# Patient Record
Sex: MALE | Race: WHITE | Employment: OTHER | URBAN - METROPOLITAN AREA
[De-identification: names, ages, dates, MRNs, and addresses within clinical notes are randomized per-mention and may not be internally consistent; named-entity substitution may affect disease eponyms.]

---

## 2017-01-23 ENCOUNTER — PRE VISIT (OUTPATIENT)
Dept: UROLOGY | Facility: CLINIC | Age: 72
End: 2017-01-23

## 2017-01-23 DIAGNOSIS — R97.20 ELEVATED PROSTATE SPECIFIC ANTIGEN (PSA): ICD-10-CM

## 2017-01-23 DIAGNOSIS — R97.20 ELEVATED PROSTATE SPECIFIC ANTIGEN (PSA): Primary | ICD-10-CM

## 2017-01-23 LAB — PSA SERPL-MCNC: 2.72 UG/L (ref 0–4)

## 2017-01-23 PROCEDURE — 84153 ASSAY OF PSA TOTAL: CPT | Performed by: FAMILY MEDICINE

## 2017-01-23 PROCEDURE — 36415 COLL VENOUS BLD VENIPUNCTURE: CPT | Performed by: FAMILY MEDICINE

## 2017-01-24 ENCOUNTER — OFFICE VISIT (OUTPATIENT)
Dept: UROLOGY | Facility: CLINIC | Age: 72
End: 2017-01-24

## 2017-01-24 ENCOUNTER — ALLIED HEALTH/NURSE VISIT (OUTPATIENT)
Dept: FAMILY MEDICINE | Facility: CLINIC | Age: 72
End: 2017-01-24

## 2017-01-24 VITALS — DIASTOLIC BLOOD PRESSURE: 107 MMHG | SYSTOLIC BLOOD PRESSURE: 179 MMHG | HEART RATE: 70 BPM

## 2017-01-24 VITALS
SYSTOLIC BLOOD PRESSURE: 201 MMHG | BODY MASS INDEX: 30.55 KG/M2 | WEIGHT: 230.5 LBS | HEART RATE: 67 BPM | DIASTOLIC BLOOD PRESSURE: 102 MMHG | HEIGHT: 73 IN

## 2017-01-24 DIAGNOSIS — R35.0 URINARY FREQUENCY: ICD-10-CM

## 2017-01-24 DIAGNOSIS — N40.0 ENLARGED PROSTATE: ICD-10-CM

## 2017-01-24 DIAGNOSIS — N40.0 BENIGN NON-NODULAR PROSTATIC HYPERPLASIA, PRESENCE OF LOWER URINARY TRACT SYMPTOMS UNSPECIFIED: Primary | ICD-10-CM

## 2017-01-24 DIAGNOSIS — I10 ESSENTIAL HYPERTENSION: Primary | ICD-10-CM

## 2017-01-24 DIAGNOSIS — R97.20 ELEVATED PROSTATE SPECIFIC ANTIGEN (PSA): ICD-10-CM

## 2017-01-24 LAB
ALBUMIN UR-MCNC: NEGATIVE MG/DL
APPEARANCE UR: CLEAR
BILIRUB UR QL STRIP: NEGATIVE
COLOR UR AUTO: YELLOW
GLUCOSE UR STRIP-MCNC: NEGATIVE MG/DL
HGB UR QL STRIP: NEGATIVE
KETONES UR STRIP-MCNC: NEGATIVE MG/DL
LEUKOCYTE ESTERASE UR QL STRIP: NEGATIVE
MUCOUS THREADS #/AREA URNS LPF: PRESENT /LPF
NITRATE UR QL: NEGATIVE
PH UR STRIP: 6 PH (ref 5–7)
RBC #/AREA URNS AUTO: 2 /HPF (ref 0–2)
SP GR UR STRIP: 1.01 (ref 1–1.03)
URN SPEC COLLECT METH UR: ABNORMAL
UROBILINOGEN UR STRIP-MCNC: 0 MG/DL (ref 0–2)
WBC #/AREA URNS AUTO: 1 /HPF (ref 0–2)

## 2017-01-24 RX ORDER — TAMSULOSIN HYDROCHLORIDE 0.4 MG/1
0.4 CAPSULE ORAL DAILY
Qty: 90 CAPSULE | Refills: 4 | Status: SHIPPED | OUTPATIENT
Start: 2017-01-24 | End: 2017-08-08

## 2017-01-24 RX ORDER — FINASTERIDE 5 MG/1
1 TABLET, FILM COATED ORAL DAILY
Qty: 30 TABLET | Refills: 11 | Status: SHIPPED | OUTPATIENT
Start: 2017-01-24 | End: 2017-01-24

## 2017-01-24 RX ORDER — TAMSULOSIN HYDROCHLORIDE 0.4 MG/1
0.4 CAPSULE ORAL DAILY
Qty: 60 CAPSULE | Refills: 6 | Status: SHIPPED | OUTPATIENT
Start: 2017-01-24 | End: 2017-01-24

## 2017-01-24 RX ORDER — FINASTERIDE 5 MG/1
1 TABLET, FILM COATED ORAL DAILY
Qty: 90 TABLET | Refills: 4 | Status: SHIPPED | OUTPATIENT
Start: 2017-01-24 | End: 2017-08-08

## 2017-01-24 ASSESSMENT — PAIN SCALES - GENERAL: PAINLEVEL: NO PAIN (0)

## 2017-01-24 NOTE — Clinical Note
"1/24/2017       RE: Jimi Moreno  [address] [facility] [address]     Dear Colleague,    Thank you for referring your patient, Jimi Moreno, to the [facility] PROST[facility] UROLOGIC CANCERS at [facility]. Please see a copy of my visit note below.    HPI: Mr. Jimi Moreno is a 72 year old year old male presenting today for evaluation of chief complaint(s): RECHECK  - recheck    Mr. Moreno is a 72M is PMH significant for HTN and BPH/luts.  He was last seen on 12/29/15 by Dr. March.  At that time he had history of BPH on finasteride/flomax as well as elevated PSA.  Has never had a biopsy. No FH prostate cancer.     Today states he ran out of tamsulosin 20 days ago, and now symptoms are back to their old ways - frequency, nocturia, slow stream, sensation of incomplete emptying.  IPSS today: 32 \"Unhappy\"  No hematuria, UTIs, prostate pain.     REVIEW OF DIAGNOSTICS:  01/23/17: 2.72ng/mL (x2 would be 5.44)   12/23/15: 1.81ng/mL  03/24/15 - started finasteride  01/13/15: 4.84ng/mL  01/13/15: UA negative  09/02/09: 3.39ng/mL    PMHx - Otherwise negative  PSxHx- No surgeries - \"4 stitches\" in his lifetime  SHx - worked as a , .  Never smoker.    FHx - no FH prostate cancer    Current Outpatient Prescriptions   Medication Sig Dispense Refill     finasteride (PROSCAR) 5 MG tablet TAKE ONE TABLET BY MOUTH ONCE DAILY 30 tablet 0     tamsulosin (FLOMAX) 0.4 MG 24 hr capsule Take 1 capsule (0.4 mg) by mouth daily 60 capsule 6     triamcinolone (KENALOG) 0.1 % ointment Apply topically 2 times daily To affected areas 120 g 2     fluocinonide (LIDEX) 0.05 % ointment Apply topically 2 times daily 60 g 5     T.E.D. KNEE LENGTH/L-LONG MISC wear on left leg 1 Each 1       ALLERGIES: Pollen extract/tree extract      REVIEW OF SYSTEMS:  As above in HPI, otherwise negaitve.     GENERAL PHYSICAL EXAM:   Vitals: /102 mmHg  Pulse 67  Ht " "1.854 m (6' 1\")  Wt 104.554 kg (230 lb 8 oz)  BMI 30.42 kg/m2  Body mass index is 30.42 kg/(m^2).    GENERAL: Well groomed, well developed, well nourished male in NAD.  NEURO: Alert and oriented x 3.  PSYCH: Normal mood and affect, pleasant and agreeable during interview and exam.     SUKHDEEP:      Normal rectal tone, small soft amount of stool in the rectal vault.      Large sized prostate, without tenderness, nodules or asymmetry (although couldn't reach top of base).    Uroflow:  Voids 97mL with AHH=881uC by BladderScan.  Qmax 8mL/second. Qave 3mL/second.  Pattern shows an early peak, then platteau then postvoid dribbling.     LABS: The last test results for Mr. Jimi Moreno were reviewed:  PSA - PSA     2.72   1/23/2017  PSA     1.81   12/23/2015  PSA     4.84   1/13/2015  PSA     3.39   9/2/2009  BMP -   Recent Labs   Lab Test  04/17/14   1450  09/02/09   0918   NA  139  139   POTASSIUM  4.2  4.5   CHLORIDE  105  104   CO2  25  27   BUN  15  17   CR  1.00  1.07   GLC  92  101*   MELIDA  8.9  8.8   PHOS   --   2.5       CBC -   Recent Labs   Lab Test  04/17/14   1450   WBC  6.2   HGB  16.7   PLT  180       ASSESSMENT:   1) BPH with LUTS  2) Elevated PSA - adjusted for finasteride-effect, PSA is relatively stable    PLAN:   - Continue finasteride - refilled x 1 year  - RX: Flomax (tamsulosin) 0,4mg daily refilled x 1 year  - UA micro reflex sent  - PVR checked  - Restart the Flomax.  After 3-4 weeks if still struggling with urinary symptoms, he should schedule an appointment with Dr. Wesley or Josh to explore surgical options. If happy on the Flomax, return in 6 months with PSA    Connie Nova PA-C  Department of Urologic Surgery      "

## 2017-01-24 NOTE — PATIENT INSTRUCTIONS
- Continue finasteride - refilled x 1 year  - RX: Flomax (tamsulosin) 0,4mg daily refilled x 1 year  - Urinalysis   - You aren't emptying all the way (leaving 6-7 ounces behind)  - Restart the Flomax.  After 3-4 weeks if still struggling with urinary symptoms, he should schedule an appointment with Dr. Wesley or Josh to explore surgical options. If happy on the Flomax, return in 6 months with PSA    Connie Nova PA-C

## 2017-01-24 NOTE — NURSING NOTE
"Chief Complaint   Patient presents with     RECHECK     Follow up- LUTS/elevated PSA       Initial Ht 1.854 m (6' 1\")  Wt 104.554 kg (230 lb 8 oz)  BMI 30.42 kg/m2 Estimated body mass index is 30.42 kg/(m^2) as calculated from the following:    Height as of this encounter: 1.854 m (6' 1\").    Weight as of this encounter: 104.554 kg (230 lb 8 oz).  BP completed using cuff size: PARKER Darby    "

## 2017-01-24 NOTE — MR AVS SNAPSHOT
After Visit Summary   1/24/2017    Jimi Moreno    MRN: [id number]           Patient Information     Date Of Birth          1945        Visit Information        Provider Department      1/24/2017 11:30 AM Brianda Nova PA [facility] and [facility] for Prostate and Urologic Cancers        Today's Diagnoses     Benign non-nodular prostatic hyperplasia, presence of lower urinary tract symptoms unspecified    -  1     Elevated prostate specific antigen (PSA)         Enlarged prostate         Urinary frequency           Care Instructions    - Continue finasteride - refilled x 1 year  - RX: Flomax (tamsulosin) 0,4mg daily refilled x 1 year  - Urinalysis   - You aren't emptying all the way (leaving 6-7 ounces behind)  - Restart the Flomax.  After 3-4 weeks if still struggling with urinary symptoms, he should schedule an appointment with Dr. Wesley or Josh to explore surgical options. If happy on the Flomax, return in 6 months with PSA    Connie Nova PA-C        Follow-ups after your visit        Future tests that were ordered for you today     Open Future Orders        Priority Expected Expires Ordered    PSA tumor marker Routine 5/24/2017 1/19/2018 1/24/2017            Who to contact     Please call your clinic at [phone] to:    Ask questions about your health    Make or cancel appointments    Discuss your medicines    Learn about your test results    Speak to your doctor   If you have compliments or concerns about an experience at your clinic, or if you wish to file a complaint, please contact [facility] Patient Relations at [phone] or email us at [email or web address]         Additional Information About Your Visit        [employer] Information     [employer] is an electronic gateway that provides easy, online access to your medical records. With [employer], you can request a clinic appointment, read your test results, renew a prescription or  "communicate with your care team.     To sign up for [employer] visit the website at [email or web address]   You will be asked to enter the access code listed below, as well as some personal information. Please follow the directions to create your username and password.     Your access code is: [email or web address]  Expires: 2017 12:12 PM     Your access code will  in 90 days. If you need help or a new code, please contact your [facility] or call [phone] for assistance.        Care EveryWhere ID     This is your Care EveryWhere ID. This could be used by other organizations to access your [facility] medical records  [id number]        Your Vitals Were     Pulse Height BMI (Body Mass Index)             67 1.854 m (6' 1\") 30.42 kg/m2          Blood Pressure from Last 3 Encounters:   17 201/102   12/29/15 172/102   03/24/15 162/79    Weight from Last 3 Encounters:   17 104.554 kg (230 lb 8 oz)   12/29/15 104.69 kg (230 lb 12.8 oz)   03/24/15 103.239 kg (227 lb 9.6 oz)              We Performed the Following     POST-VOID RESIDUAL BLADDER SCAN     Routine UA with micro reflex to culture          Today's Medication Changes          These changes are accurate as of: 17 12:12 PM.  If you have any questions, ask your nurse or doctor.               Start taking these medicines.        Dose/Directions    finasteride 5 MG tablet   Commonly known as:  PROSCAR   Used for:  Enlarged prostate   Started by:  Brianda Nova PA        Dose:  1 tablet   Take 1 tablet (5 mg) by mouth daily   Quantity:  90 tablet   Refills:  4         These medicines have changed or have updated prescriptions.        Dose/Directions    * tamsulosin 0.4 MG capsule   Commonly known as:  FLOMAX   This may have changed:  Another medication with the same name was added. Make sure you understand how and when to take each.   Used for:  Elevated prostate specific antigen (PSA), Urinary hesitancy "   Changed by:  Rob March MD        Dose:  0.4 mg   Take 1 capsule (0.4 mg) by mouth daily   Quantity:  60 capsule   Refills:  6       * tamsulosin 0.4 MG capsule   Commonly known as:  FLOMAX   This may have changed:  You were already taking a medication with the same name, and this prescription was added. Make sure you understand how and when to take each.   Used for:  Benign non-nodular prostatic hyperplasia, presence of lower urinary tract symptoms unspecified   Changed by:  Brianda Nova PA        Dose:  0.4 mg   Take 1 capsule (0.4 mg) by mouth daily   Quantity:  90 capsule   Refills:  4       * Notice:  This list has 2 medication(s) that are the same as other medications prescribed for you. Read the directions carefully, and ask your doctor or other care provider to review them with you.         Where to get your medicines      These medications were sent to [facility] [address] [place] [id number]     Phone:  [phone]    - finasteride 5 MG tablet  - tamsulosin 0.4 MG capsule             Primary Care Provider Office Phone # Fax #    Aj Becerra [phone]       [facility] [address] [address]        Thank you!     Thank you for choosing [facility] AND [facility] FOR PROSTATE AND UROLOGIC CANCERS  for your care. Our goal is always to provide you with excellent care. Hearing back from our patients is one way we can continue to improve our services. Please take a few minutes to complete the written survey that you may receive in the mail after your visit with us. Thank you!             Your Updated Medication List - Protect others around you: Learn how to safely use, store and throw away your medicines at www.disposemymeds.org.          This list is accurate as of: 1/24/17 12:12 PM.  Always use your most recent med list.                   Brand Name Dispense Instructions for  use    finasteride 5 MG tablet    PROSCAR    90 tablet    Take 1 tablet (5 mg) by mouth daily       fluocinonide 0.05 % ointment    LIDEX    60 g    Apply topically 2 times daily       T.E.D. KNEE LENGTH/L-LONG Misc     1 Each    wear on left leg       * tamsulosin 0.4 MG capsule    FLOMAX    60 capsule    Take 1 capsule (0.4 mg) by mouth daily       * tamsulosin 0.4 MG capsule    FLOMAX    90 capsule    Take 1 capsule (0.4 mg) by mouth daily       triamcinolone 0.1 % ointment    KENALOG    120 g    Apply topically 2 times daily To affected areas       * Notice:  This list has 2 medication(s) that are the same as other medications prescribed for you. Read the directions carefully, and ask your doctor or other care provider to review them with you.

## 2017-01-24 NOTE — Clinical Note
Jimi Kayecock   [address] [facility] [address]        Dear Mr. Moreno:     I am writing you concerning your recent test results:    Results for orders placed or performed in visit on 01/24/17   Routine UA with micro reflex to culture   Result Value Ref Range    Color Urine Yellow     Appearance Urine Clear     Glucose Urine Negative NEG mg/dL    Bilirubin Urine Negative NEG    Ketones Urine Negative NEG mg/dL    Specific Gravity Urine 1.010 1.003 - 1.035    Blood Urine Negative NEG    pH Urine 6.0 5.0 - 7.0 pH    Protein Albumin Urine Negative NEG mg/dL    Urobilinogen mg/dL 0.0 0.0 - 2.0 mg/dL    Nitrite Urine Negative NEG    Leukocyte Esterase Urine Negative NEG    Source Midstream Urine     WBC Urine 1 0 - 2 /HPF    RBC Urine 2 0 - 2 /HPF    Mucous Urine Present (A) NEG /LPF     Resulted Orders   Routine UA with micro reflex to culture   Result Value Ref Range    Color Urine Yellow     Appearance Urine Clear     Glucose Urine Negative NEG mg/dL    Bilirubin Urine Negative NEG    Ketones Urine Negative NEG mg/dL    Specific Gravity Urine 1.010 1.003 - 1.035    Blood Urine Negative NEG    pH Urine 6.0 5.0 - 7.0 pH    Protein Albumin Urine Negative NEG mg/dL    Urobilinogen mg/dL 0.0 0.0 - 2.0 mg/dL    Nitrite Urine Negative NEG    Leukocyte Esterase Urine Negative NEG    Source Midstream Urine     WBC Urine 1 0 - 2 /HPF    RBC Urine 2 0 - 2 /HPF    Mucous Urine Present (A) NEG /LPF       Your urinalysis is within normal limits. There is no significant blood or concern for infection.     Please let me know if you have any questions.      Sincerely,      Brianda Nova PA-C  Physician Assistant Urology        [facility] AND [facility] FOR PROSTATE AND UROLOGIC CANCERS  [address] [facility]  [address] [facility] [address]  Phone: [phone]  Fax: [phone]

## 2017-01-24 NOTE — PROGRESS NOTES
"HPI: Mr. Jimi Moreno is a 72 year old year old male presenting today for evaluation of chief complaint(s): RECHECK  - recheck    Mr. Moreno is a 72M is PMH significant for HTN and BPH/luts.  He was last seen on 12/29/15 by Dr. March.  At that time he had history of BPH on finasteride/flomax as well as elevated PSA.  Has never had a biopsy. No FH prostate cancer.     Today states he ran out of tamsulosin 20 days ago, and now symptoms are back to their old ways - frequency, nocturia, slow stream, sensation of incomplete emptying.  IPSS today: 32 \"Unhappy\"  No hematuria, UTIs, prostate pain.     REVIEW OF DIAGNOSTICS:  01/23/17: 2.72ng/mL (x2 would be 5.44)   12/23/15: 1.81ng/mL  03/24/15 - started finasteride  01/13/15: 4.84ng/mL  01/13/15: UA negative  09/02/09: 3.39ng/mL    PMHx - Otherwise negative  PSxHx- No surgeries - \"4 stitches\" in his lifetime  SHx - worked as a , .  Never smoker.    FHx - no FH prostate cancer    Current Outpatient Prescriptions   Medication Sig Dispense Refill     finasteride (PROSCAR) 5 MG tablet TAKE ONE TABLET BY MOUTH ONCE DAILY 30 tablet 0     tamsulosin (FLOMAX) 0.4 MG 24 hr capsule Take 1 capsule (0.4 mg) by mouth daily 60 capsule 6     triamcinolone (KENALOG) 0.1 % ointment Apply topically 2 times daily To affected areas 120 g 2     fluocinonide (LIDEX) 0.05 % ointment Apply topically 2 times daily 60 g 5     T.E.D. KNEE LENGTH/L-LONG MISC wear on left leg 1 Each 1       ALLERGIES: Pollen extract/tree extract      REVIEW OF SYSTEMS:  As above in HPI, otherwise negaitve.     GENERAL PHYSICAL EXAM:   Vitals: /102 mmHg  Pulse 67  Ht 1.854 m (6' 1\")  Wt 104.554 kg (230 lb 8 oz)  BMI 30.42 kg/m2  Body mass index is 30.42 kg/(m^2).    GENERAL: Well groomed, well developed, well nourished male in NAD.  NEURO: Alert and oriented x 3.  PSYCH: Normal mood and affect, pleasant and agreeable during interview and exam.     SUKHDEEP:      Normal rectal tone, small soft " amount of stool in the rectal vault.      Large sized prostate, without tenderness, nodules or asymmetry (although couldn't reach top of base).    Uroflow:  Voids 97mL with IDF=058cW by BladderScan.  Qmax 8mL/second. Qave 3mL/second.  Pattern shows an early peak, then platteau then postvoid dribbling.     LABS: The last test results for Mr. Jimi Moreno were reviewed:  PSA - PSA     2.72   1/23/2017  PSA     1.81   12/23/2015  PSA     4.84   1/13/2015  PSA     3.39   9/2/2009  BMP -   Recent Labs   Lab Test  04/17/14   1450  09/02/09   0918   NA  139  139   POTASSIUM  4.2  4.5   CHLORIDE  105  104   CO2  25  27   BUN  15  17   CR  1.00  1.07   GLC  92  101*   MELIDA  8.9  8.8   PHOS   --   2.5       CBC -   Recent Labs   Lab Test  04/17/14   1450   WBC  6.2   HGB  16.7   PLT  180       ASSESSMENT:   1) BPH with LUTS  2) Elevated PSA - adjusted for finasteride-effect, PSA is relatively stable    PLAN:   - Continue finasteride - refilled x 1 year  - RX: Flomax (tamsulosin) 0,4mg daily refilled x 1 year  - UA micro reflex sent  - PVR checked  - Restart the Flomax.  After 3-4 weeks if still struggling with urinary symptoms, he should schedule an appointment with Dr. Wesley or Josh to explore surgical options. If happy on the Flomax, return in 6 months with PSA    Connie Nova PA-C  Department of Urologic Surgery

## 2017-01-25 ENCOUNTER — TELEPHONE (OUTPATIENT)
Dept: UROLOGY | Facility: CLINIC | Age: 72
End: 2017-01-25

## 2017-01-25 NOTE — TELEPHONE ENCOUNTER
"Left message with Mr. Moreno yesterday to alert him to his high /102.  Asked him to recheck the BP and followup urgently with PCP OR go to the ED with symptoms.      Today I spoke with him.  He told me he had his BP recheck and \"it was a little better\" - \"199 over something.\"  He continues to deny headaches, dizziness, blurry vision or cardiac concerns.  He has an appt with his PCP first thing in the morning tomorrow.  He knows to go to the ED with any concerns.      Connie Nova PA-C  "

## 2017-01-26 ENCOUNTER — OFFICE VISIT (OUTPATIENT)
Dept: FAMILY MEDICINE | Facility: CLINIC | Age: 72
End: 2017-01-26
Payer: COMMERCIAL

## 2017-01-26 VITALS
HEART RATE: 79 BPM | DIASTOLIC BLOOD PRESSURE: 91 MMHG | TEMPERATURE: 97.9 F | WEIGHT: 236 LBS | BODY MASS INDEX: 31.14 KG/M2 | OXYGEN SATURATION: 95 % | SYSTOLIC BLOOD PRESSURE: 170 MMHG

## 2017-01-26 DIAGNOSIS — I10 ESSENTIAL HYPERTENSION WITH GOAL BLOOD PRESSURE LESS THAN 140/90: Primary | ICD-10-CM

## 2017-01-26 LAB
ANION GAP SERPL CALCULATED.3IONS-SCNC: 8 MMOL/L (ref 3–14)
BUN SERPL-MCNC: 17 MG/DL (ref 7–30)
CALCIUM SERPL-MCNC: 8.6 MG/DL (ref 8.5–10.1)
CHLORIDE SERPL-SCNC: 109 MMOL/L (ref 94–109)
CO2 SERPL-SCNC: 24 MMOL/L (ref 20–32)
CREAT SERPL-MCNC: 1.04 MG/DL (ref 0.66–1.25)
GFR SERPL CREATININE-BSD FRML MDRD: 70 ML/MIN/1.7M2
GLUCOSE SERPL-MCNC: 97 MG/DL (ref 70–99)
POTASSIUM SERPL-SCNC: 4.4 MMOL/L (ref 3.4–5.3)
SODIUM SERPL-SCNC: 141 MMOL/L (ref 133–144)

## 2017-01-26 PROCEDURE — 99213 OFFICE O/P EST LOW 20 MIN: CPT | Performed by: FAMILY MEDICINE

## 2017-01-26 PROCEDURE — 93000 ELECTROCARDIOGRAM COMPLETE: CPT | Performed by: FAMILY MEDICINE

## 2017-01-26 PROCEDURE — 36415 COLL VENOUS BLD VENIPUNCTURE: CPT | Performed by: FAMILY MEDICINE

## 2017-01-26 PROCEDURE — 80048 BASIC METABOLIC PNL TOTAL CA: CPT | Performed by: FAMILY MEDICINE

## 2017-01-26 RX ORDER — LISINOPRIL 10 MG/1
10 TABLET ORAL DAILY
Qty: 30 TABLET | Refills: 1 | Status: SHIPPED | OUTPATIENT
Start: 2017-01-26 | End: 2017-02-16

## 2017-01-26 ASSESSMENT — PAIN SCALES - GENERAL: PAINLEVEL: NO PAIN (0)

## 2017-01-26 NOTE — Clinical Note
[facility]   [address] [place]  [address]  [phone]                                   January 27, 2017    Jimi Moreno  [address] [facility] [address]        Dear Jimi,    Your basic metabolic panel which includes electrolytes,kidney function is normal and  -Glucose (diabetic screening test) is within normal limits    Follow up in 1 year(s)    Results for orders placed or performed in visit on 01/26/17   Basic metabolic panel   Result Value Ref Range    Sodium 141 133 - 144 mmol/L    Potassium 4.4 3.4 - 5.3 mmol/L    Chloride 109 94 - 109 mmol/L    Carbon Dioxide 24 20 - 32 mmol/L    Anion Gap 8 3 - 14 mmol/L    Glucose 97 70 - 99 mg/dL    Urea Nitrogen 17 7 - 30 mg/dL    Creatinine 1.04 0.66 - 1.25 mg/dL    GFR Estimate 70 >60 mL/min/1.7m2    GFR Estimate If Black 84 >60 mL/min/1.7m2    Calcium 8.6 8.5 - 10.1 mg/dL       If you have any questions please call the clinic at [phone]    Sincerely,    Taco Talbert MD  bmd

## 2017-01-26 NOTE — NURSING NOTE
"Chief Complaint   Patient presents with     Recheck Medication       Initial /91 mmHg  Pulse 79  Temp(Src) 97.9  F (36.6  C) (Oral)  Wt 236 lb (107.049 kg)  SpO2 95% Estimated body mass index is 31.14 kg/(m^2) as calculated from the following:    Height as of 1/24/17: 6' 1\" (1.854 m).    Weight as of this encounter: 236 lb (107.049 kg).  BP completed using cuff size: ping Frank MA    "

## 2017-01-26 NOTE — PROGRESS NOTES
SUBJECTIVE:                                                    Jimi Moreno is a 72 year old male who presents to clinic today for the following health issues:      Medication Followup of  All    Taking Medication as prescribed: yes    Side Effects:  None    Medication Helping Symptoms:  yes     Concerned about his blood pressure    Problem list and histories reviewed & adjusted, as indicated.    Never a smoker   No diabetes     Gm had diabetes   No heart problems     Patient does not take an asa     O: /91 mmHg  Pulse 79  Temp(Src) 97.9  F (36.6  C) (Oral)  Wt 236 lb (107.049 kg)  SpO2 95%    No bruits in the neck     Head: Normocephalic, atraumatic.  Eyes: Conjunctiva clear, non icteric. PERRLA.  Ears: External ears and TMs normal BL.  Nose: Septum midline, nasal mucosa pink and moist. No discharge.  Mouth / Throat: Normal dentition.  No oral lesions. Pharynx non erythematous, tonsils without hypertrophy.  Neck: Supple, no enlarged LN, trachea midline.    Chest wall normal to inspection and palpation. Good excursion bilaterally. Lungs clear to auscultation. Good air movement bilaterally without rales, wheezes, or rhonchi.   Regular rate and  rhythm. S1 and S2 normal, no murmurs, clicks, gallops or rubs. No edema or JVD.    The abdomen is soft without tenderness, guarding, mass, rebound or organomegaly. Bowel sounds are normal. No CVA tenderness or inguinal adenopathy noted.      ICD-10-CM    1. Essential hypertension with goal blood pressure less than 140/90 I10 lisinopril (PRINIVIL/ZESTRIL) 10 MG tablet     EKG 12-lead complete w/read - Clinics     Basic metabolic panel     Recheck in 1 month

## 2017-01-26 NOTE — MR AVS SNAPSHOT
"              After Visit Summary   2017    Jimi Moreno    MRN: [id number]           Patient Information     Date Of Birth          1945        Visit Information        Provider Department      2017 9:00 AM Taco Talbert MD [facility]        Today's Diagnoses     Essential hypertension with goal blood pressure less than 140/90    -  1        Follow-ups after your visit        Who to contact     If you have questions or need follow up information about today's clinic visit or your schedule please contact [facility] directly at [phone].  Normal or non-critical lab and imaging results will be communicated to you by [employer], letter or phone within 4 business days after the clinic has received the results. If you do not hear from us within 7 days, please contact the clinic through [employer] or phone. If you have a critical or abnormal lab result, we will notify you by phone as soon as possible.  Submit refill requests through [employer] or call your pharmacy and they will forward the refill request to us. Please allow 3 business days for your refill to be completed.          Additional Information About Your Visit        MyChart Information     [employer] lets you send messages to your doctor, view your test results, renew your prescriptions, schedule appointments and more. To sign up, go to [email or web address] . Click on \"Log in\" on the left side of the screen, which will take you to the Welcome page. Then click on \"Sign up Now\" on the right side of the page.     You will be asked to enter the access code listed below, as well as some personal information. Please follow the directions to create your username and password.     Your access code is: [email or web address]  Expires: 2017 12:12 PM     Your access code will  in 90 days. If you need help or a new code, please call your [facility] or [phone].        Care EveryWhere ID     This is " your Care EveryWhere ID. This could be used by other organizations to access your [place] medical records  [id number]        Your Vitals Were     Pulse Temperature Pulse Oximetry             79 97.9  F (36.6  C) (Oral) 95%          Blood Pressure from Last 3 Encounters:   01/26/17 170/91   01/24/17 179/107   01/24/17 201/102    Weight from Last 3 Encounters:   01/26/17 236 lb (107.049 kg)   01/24/17 230 lb 8 oz (104.554 kg)   12/29/15 230 lb 12.8 oz (104.69 kg)              We Performed the Following     Basic metabolic panel     EKG 12-lead complete w/read - Clinics          Today's Medication Changes          These changes are accurate as of: 1/26/17 10:09 AM.  If you have any questions, ask your nurse or doctor.               Start taking these medicines.        Dose/Directions    lisinopril 10 MG tablet   Commonly known as:  PRINIVIL/ZESTRIL   Used for:  Essential hypertension with goal blood pressure less than 140/90   Started by:  Taco Talbert MD        Dose:  10 mg   Take 1 tablet (10 mg) by mouth daily   Quantity:  30 tablet   Refills:  1            Where to get your medicines      These medications were sent to [facility] [address] [place] [address]     Phone:  [phone]    - lisinopril 10 MG tablet             Primary Care Provider Office Phone # Fax #    Aj Becerra [phone]       [facility] [address] [address]        Thank you!     Thank you for choosing [facility]  for your care. Our goal is always to provide you with excellent care. Hearing back from our patients is one way we can continue to improve our services. Please take a few minutes to complete the written survey that you may receive in the mail after your visit with us. Thank you!             Your Updated Medication List - Protect others around you: Learn how to safely use, store and  throw away your medicines at www.disposemymeds.org.          This list is accurate as of: 1/26/17 10:09 AM.  Always use your most recent med list.                   Brand Name Dispense Instructions for use    finasteride 5 MG tablet    PROSCAR    90 tablet    Take 1 tablet (5 mg) by mouth daily       lisinopril 10 MG tablet    PRINIVIL/ZESTRIL    30 tablet    Take 1 tablet (10 mg) by mouth daily       tamsulosin 0.4 MG capsule    FLOMAX    90 capsule    Take 1 capsule (0.4 mg) by mouth daily

## 2017-01-26 NOTE — Clinical Note
[facility]   [address] [place]  [address]  [phone]                                   January 27, 2017    Jimi Moreno  [address] [facility] [address]        Dear Jimi,    Your ekg was normal    Results for orders placed or performed in visit on 01/26/17   Basic metabolic panel   Result Value Ref Range    Sodium 141 133 - 144 mmol/L    Potassium 4.4 3.4 - 5.3 mmol/L    Chloride 109 94 - 109 mmol/L    Carbon Dioxide 24 20 - 32 mmol/L    Anion Gap 8 3 - 14 mmol/L    Glucose 97 70 - 99 mg/dL    Urea Nitrogen 17 7 - 30 mg/dL    Creatinine 1.04 0.66 - 1.25 mg/dL    GFR Estimate 70 >60 mL/min/1.7m2    GFR Estimate If Black 84 >60 mL/min/1.7m2    Calcium 8.6 8.5 - 10.1 mg/dL       If you have any questions please call the clinic at [phone]    Sincerely,    Taco Talbert MD  bmd

## 2017-01-27 NOTE — PROGRESS NOTES
Quick Note:    Your basic metabolic panel which includes electrolytes,kidney function is normal and -Glucose (diabetic screening test) is within normal limits    Follow up in 1 year(s)    ______

## 2017-02-07 PROBLEM — I10 ESSENTIAL HYPERTENSION WITH GOAL BLOOD PRESSURE LESS THAN 140/90: Status: ACTIVE | Noted: 2017-02-07

## 2017-02-16 ENCOUNTER — OFFICE VISIT (OUTPATIENT)
Dept: FAMILY MEDICINE | Facility: CLINIC | Age: 72
End: 2017-02-16
Payer: COMMERCIAL

## 2017-02-16 VITALS
SYSTOLIC BLOOD PRESSURE: 145 MMHG | HEART RATE: 71 BPM | BODY MASS INDEX: 31.27 KG/M2 | TEMPERATURE: 98.1 F | OXYGEN SATURATION: 95 % | DIASTOLIC BLOOD PRESSURE: 85 MMHG | WEIGHT: 237 LBS

## 2017-02-16 DIAGNOSIS — I10 ESSENTIAL HYPERTENSION WITH GOAL BLOOD PRESSURE LESS THAN 140/90: ICD-10-CM

## 2017-02-16 PROCEDURE — 99213 OFFICE O/P EST LOW 20 MIN: CPT | Performed by: FAMILY MEDICINE

## 2017-02-16 RX ORDER — LISINOPRIL 20 MG/1
20 TABLET ORAL DAILY
Qty: 30 TABLET | Refills: 1 | Status: SHIPPED | OUTPATIENT
Start: 2017-02-16 | End: 2017-03-30

## 2017-02-16 ASSESSMENT — PAIN SCALES - GENERAL: PAINLEVEL: NO PAIN (0)

## 2017-02-16 NOTE — NURSING NOTE
"Chief Complaint   Patient presents with     RECHECK     Blood pressure       Initial /85  Pulse 71  Temp 98.1  F (36.7  C) (Oral)  Wt 237 lb (107.5 kg)  SpO2 95%  BMI 31.27 kg/m2 Estimated body mass index is 31.27 kg/(m^2) as calculated from the following:    Height as of 1/24/17: 6' 1\" (1.854 m).    Weight as of this encounter: 237 lb (107.5 kg).  Medication Reconciliation: complete   Zac LENZ      "

## 2017-02-16 NOTE — MR AVS SNAPSHOT
After Visit Summary   2/16/2017    Jimi Moreno    MRN: [id number]           Patient Information     Date Of Birth          1945        Visit Information        Provider Department      2/16/2017 9:00 AM Taco Talbert MD [facility]        Today's Diagnoses     Essential hypertension with goal blood pressure less than 140/90          Care Instructions      Discharge Instructions for High Blood Pressure (Hypertension)  You have been diagnosed with high blood pressure (also called hypertension). This means the force of blood against your artery walls is too strong. It also means your heart is working hard to move blood. High blood pressure usually has no symptoms, but over time, it can damage your heart, blood vessels, eyes, kidneys, and other organs. With help from your doctor, you can manage your blood pressure and protect your health.  Taking medications    Learn to take your own blood pressure. Keep a record of your results. Ask your doctor which readings mean that you need medical attention.    Take your blood pressure medication exactly as directed. Don t skip doses. Missing doses can cause your blood pressure to get out of control.    Avoid medications that contain heart stimulants, including over-the-counter drugs. Check for warnings about high blood pressure on the label.    Check with your doctor before taking a decongestant. Some decongestants can worsen high blood pressure.  Lifestyle changes    Maintain a healthy weight. Get help to lose any extra pounds.    Cut back on salt.    Limit canned, dried, packaged, and fast foods.    Don t add salt to your food at the table.    Season foods with herbs instead of salt when you cook.    Follow the DASH (Dietary Approaches to Stop Hypertension) eating plan. This plan recommends vegetables, fruits, whole gains, and other heart healthy foods.    Begin an exercise program. Ask your doctor how to get started. The  American Heart Association recommends aerobic exercise 3 to 4 times a week for an average of 40 minutes at a time, with your doctor's approval. Simple activities like walking or gardening can help.    Break the smoking habit. Enroll in a stop-smoking program to improve your chances of success. Ask your health care provider about programs and medications to help you stop smoking.    Limit drinks that contain caffeine (coffee, black or green tea, cola) to 2 per day.    Never take stimulants such as amphetamines or cocaine; these drugs can be deadly for someone with high blood pressure.    Control your stress. Learn stress-management techniques.    Limit alcohol to no more than 1 drink a day for women and 2 drinks a day for men.  Follow-up care  Make a follow-up appointment as directed by our staff.     When to seek medical care  Call your doctor immediately if you have any of the following:    Chest pain or shortness of breath (call 911)    Moderate to severe headache    Weakness in the muscles of your face, arms, or legs    Trouble speaking    Extreme drowsiness    Confusion    Fainting or dizziness    Pulsating or rushing sound in your ears    Unexplained nosebleed    Weakness, tingling, or numbness of your face, arms, or legs    Change in vision    Blood pressure measured at home that is greater than 180/110     0626-2286 [employer]. [address] [place] [address], [place] [address]. All rights reserved. This information is not intended as a substitute for professional medical care. Always follow your healthcare professional's instructions.              Follow-ups after your visit        Follow-up notes from your care team     Return in about 4 weeks (around 3/16/2017).      Who to contact     If you have questions or need follow up information about today's clinic visit or your schedule please contact [facility] directly at [phone].  Normal or non-critical lab and imaging results  "will be communicated to you by MyChart, letter or phone within 4 business days after the clinic has received the results. If you do not hear from us within 7 days, please contact the clinic through WhatsApp or phone. If you have a critical or abnormal lab result, we will notify you by phone as soon as possible.  Submit refill requests through Whats[employer] or call your pharmacy and they will forward the refill request to us. Please allow 3 business days for your refill to be completed.          Additional Information About Your Visit        WhatsApp Information     [employer] lets you send messages to your doctor, view your test results, renew your prescriptions, schedule appointments and more. To sign up, go to [email or web address] . Click on \"Log in\" on the left side of the screen, which will take you to the Welcome page. Then click on \"Sign up Now\" on the right side of the page.     You will be asked to enter the access code listed below, as well as some personal information. Please follow the directions to create your username and password.     Your access code is: [email or web address]  Expires: 2017 12:12 PM     Your access code will  in 90 days. If you need help or a new code, please call your [place] clinic or [phone].        Care EveryWhere ID     This is your Care EveryWhere ID. This could be used by other organizations to access your [place] medical records  [id number]        Your Vitals Were     Pulse Temperature Pulse Oximetry BMI (Body Mass Index)          71 98.1  F (36.7  C) (Oral) 95% 31.27 kg/m2         Blood Pressure from Last 3 Encounters:   17 145/85   17 (!) 170/91   17 (!) 179/107    Weight from Last 3 Encounters:   17 237 lb (107.5 kg)   17 236 lb (107 kg)   17 230 lb 8 oz (104.6 kg)              Today, you had the following     No orders found for display         Today's Medication Changes          These changes are accurate as of: 17 10:06 AM.  If " you have any questions, ask your nurse or doctor.               These medicines have changed or have updated prescriptions.        Dose/Directions    lisinopril 20 MG tablet   Commonly known as:  PRINIVIL/ZESTRIL   This may have changed:    - medication strength  - how much to take   Used for:  Essential hypertension with goal blood pressure less than 140/90   Changed by:  Taco Talbert MD        Dose:  20 mg   Take 1 tablet (20 mg) by mouth daily   Quantity:  30 tablet   Refills:  1            Where to get your medicines      These medications were sent to [facility] [address] [place] [address] [address] [address]     Phone:  [phone]     lisinopril 20 MG tablet                Primary Care Provider Office Phone # Fax #    Aj Becerra [phone]       [facility] [address]    [facility] [address]        Thank you!     Thank you for choosing [facility]  for your care. Our goal is always to provide you with excellent care. Hearing back from our patients is one way we can continue to improve our services. Please take a few minutes to complete the written survey that you may receive in the mail after your visit with us. Thank you!             Your Updated Medication List - Protect others around you: Learn how to safely use, store and throw away your medicines at www.disposemymeds.org.          This list is accurate as of: 2/16/17 10:06 AM.  Always use your most recent med list.                   Brand Name Dispense Instructions for use    finasteride 5 MG tablet    PROSCAR    90 tablet    Take 1 tablet (5 mg) by mouth daily       lisinopril 20 MG tablet    PRINIVIL/ZESTRIL    30 tablet    Take 1 tablet (20 mg) by mouth daily       tamsulosin 0.4 MG capsule    FLOMAX    90 capsule    Take 1 capsule (0.4 mg) by mouth daily

## 2017-02-16 NOTE — PATIENT INSTRUCTIONS
Discharge Instructions for High Blood Pressure (Hypertension)  You have been diagnosed with high blood pressure (also called hypertension). This means the force of blood against your artery walls is too strong. It also means your heart is working hard to move blood. High blood pressure usually has no symptoms, but over time, it can damage your heart, blood vessels, eyes, kidneys, and other organs. With help from your doctor, you can manage your blood pressure and protect your health.  Taking medications    Learn to take your own blood pressure. Keep a record of your results. Ask your doctor which readings mean that you need medical attention.    Take your blood pressure medication exactly as directed. Don t skip doses. Missing doses can cause your blood pressure to get out of control.    Avoid medications that contain heart stimulants, including over-the-counter drugs. Check for warnings about high blood pressure on the label.    Check with your doctor before taking a decongestant. Some decongestants can worsen high blood pressure.  Lifestyle changes    Maintain a healthy weight. Get help to lose any extra pounds.    Cut back on salt.    Limit canned, dried, packaged, and fast foods.    Don t add salt to your food at the table.    Season foods with herbs instead of salt when you cook.    Follow the DASH (Dietary Approaches to Stop Hypertension) eating plan. This plan recommends vegetables, fruits, whole gains, and other heart healthy foods.    Begin an exercise program. Ask your doctor how to get started. The American Heart Association recommends aerobic exercise 3 to 4 times a week for an average of 40 minutes at a time, with your doctor's approval. Simple activities like walking or gardening can help.    Break the smoking habit. Enroll in a stop-smoking program to improve your chances of success. Ask your health care provider about programs and medications to help you stop smoking.    Limit drinks that contain  caffeine (coffee, black or green tea, cola) to 2 per day.    Never take stimulants such as amphetamines or cocaine; these drugs can be deadly for someone with high blood pressure.    Control your stress. Learn stress-management techniques.    Limit alcohol to no more than 1 drink a day for women and 2 drinks a day for men.  Follow-up care  Make a follow-up appointment as directed by our staff.     When to seek medical care  Call your doctor immediately if you have any of the following:    Chest pain or shortness of breath (call 911)    Moderate to severe headache    Weakness in the muscles of your face, arms, or legs    Trouble speaking    Extreme drowsiness    Confusion    Fainting or dizziness    Pulsating or rushing sound in your ears    Unexplained nosebleed    Weakness, tingling, or numbness of your face, arms, or legs    Change in vision    Blood pressure measured at home that is greater than 180/110     3068-5250 [employer]. [address] [place] [address], [place] [address]. All rights reserved. This information is not intended as a substitute for professional medical care. Always follow your healthcare professional's instructions.

## 2017-02-16 NOTE — PROGRESS NOTES
SUBJECTIVE:                                                    Jimi Moreno is a 72 year old male who presents to clinic today for the following health issues:    Blood pressure follow up    Ros: no chest pain, chest tightness   No sob   No leg edema   No abdominal pain     Denies fever or chills   Weight stable     Patient is having phlegm   He is coughing for this reason     Patient has a bp machine   He missed the pill one day and the next day and the bp was high         Problem list and histories reviewed & adjusted, as indicated.    O: /85  Pulse 71  Temp 98.1  F (36.7  C) (Oral)  Wt 237 lb (107.5 kg)  SpO2 95%  BMI 31.27 kg/m2    bp was 150/80 for repeat     Chest wall normal to inspection and palpation. Good excursion bilaterally. Lungs clear to auscultation. Good air movement bilaterally without rales, wheezes, or rhonchi.   Regular rate and  rhythm. S1 and S2 normal, no murmurs, clicks, gallops or rubs. No edema or JVD.      ICD-10-CM    1. Essential hypertension with goal blood pressure less than 140/90 I10      Recheck in 1 month   Will do a bmp next month

## 2017-03-21 ENCOUNTER — TELEPHONE (OUTPATIENT)
Dept: FAMILY MEDICINE | Facility: CLINIC | Age: 72
End: 2017-03-21

## 2017-03-21 NOTE — TELEPHONE ENCOUNTER
Panel Management Review      Patient has the following on his problem list:     Hypertension   Last three blood pressure readings:  BP Readings from Last 3 Encounters:   02/16/17 145/85   01/26/17 (!) 170/91   01/24/17 (!) 179/107     Blood pressure: FAILED    HTN Guidelines:  Age 18-59 BP range:  Less than 140/90  Age 60-85 with Diabetes:  Less than 140/90  Age 60-85 without Diabetes:  less than 150/90      Composite cancer screening  Chart review shows that this patient is due/due soon for the following Colonoscopy  Summary:    Patient is due/failing the following:   COLONOSCOPY    Action needed:   Patient needs referral/order: Colonoscopy    Type of outreach:    Sent letter.    Questions for provider review:    None                                                                                                                                    Mana Francis MA       Chart routed to Care Team .

## 2017-03-21 NOTE — LETTER
March 21, 2017    Jimi Moreno  [address] [facility] [address]    Dear Jimi    We care about your health and have reviewed your health plan. We have reviewed your medical conditions, medication list, and lab results and are making recommendations based on this review, to better manage your health.    You are in particular need of attention regarding:  - Scheduling a Colon Cancer Screening (Colonoscopy only) [phone]      Here is a list of Health Maintenance topics that are due now or due soon:  Health Maintenance Due   Topic Date Due     TETANUS IMMUNIZATION (SYSTEM ASSIGNED)  01/05/1963     ADVANCE DIRECTIVE PLANNING Q5 YRS (NO INBASKET)  01/05/1961     PNEUMOCOCCAL (1 of 2 - PCV13) 01/05/2008     AORTIC ANEURYSM SCREENING (SYSTEM ASSIGNED)  01/05/2008     LIPID SCREEN Q5 YR MALE (SYSTEM ASSIGNED)  09/02/2014     COLON CANCER SCREEN (SYSTEM ASSIGNED)  01/19/2015     We will be calling you in the next couple of weeks to help you schedule any appointments that are needed.  Please call us at [phone] (or use [employer]) to address the above recommendations.     Thank you for trusting [facility] and we appreciate the opportunity to serve you.  We look forward to supporting your healthcare needs in the future.    Healthy Regards,    Your Care Team

## 2017-03-30 ENCOUNTER — OFFICE VISIT (OUTPATIENT)
Dept: FAMILY MEDICINE | Facility: CLINIC | Age: 72
End: 2017-03-30
Payer: COMMERCIAL

## 2017-03-30 VITALS
DIASTOLIC BLOOD PRESSURE: 70 MMHG | HEART RATE: 65 BPM | OXYGEN SATURATION: 95 % | BODY MASS INDEX: 31.14 KG/M2 | TEMPERATURE: 97.4 F | WEIGHT: 236 LBS | SYSTOLIC BLOOD PRESSURE: 130 MMHG

## 2017-03-30 DIAGNOSIS — I10 ESSENTIAL HYPERTENSION WITH GOAL BLOOD PRESSURE LESS THAN 140/90: ICD-10-CM

## 2017-03-30 LAB
ANION GAP SERPL CALCULATED.3IONS-SCNC: 9 MMOL/L (ref 3–14)
BUN SERPL-MCNC: 20 MG/DL (ref 7–30)
CALCIUM SERPL-MCNC: 9 MG/DL (ref 8.5–10.1)
CHLORIDE SERPL-SCNC: 109 MMOL/L (ref 94–109)
CO2 SERPL-SCNC: 24 MMOL/L (ref 20–32)
CREAT SERPL-MCNC: 1.09 MG/DL (ref 0.66–1.25)
GFR SERPL CREATININE-BSD FRML MDRD: 66 ML/MIN/1.7M2
GLUCOSE SERPL-MCNC: 108 MG/DL (ref 70–99)
POTASSIUM SERPL-SCNC: 4.4 MMOL/L (ref 3.4–5.3)
SODIUM SERPL-SCNC: 142 MMOL/L (ref 133–144)

## 2017-03-30 PROCEDURE — 80048 BASIC METABOLIC PNL TOTAL CA: CPT | Performed by: FAMILY MEDICINE

## 2017-03-30 PROCEDURE — 36415 COLL VENOUS BLD VENIPUNCTURE: CPT | Performed by: FAMILY MEDICINE

## 2017-03-30 PROCEDURE — 99214 OFFICE O/P EST MOD 30 MIN: CPT | Performed by: FAMILY MEDICINE

## 2017-03-30 RX ORDER — LISINOPRIL 20 MG/1
20 TABLET ORAL DAILY
Qty: 90 TABLET | Refills: 3 | Status: SHIPPED | OUTPATIENT
Start: 2017-03-30 | End: 2018-04-14

## 2017-03-30 ASSESSMENT — PAIN SCALES - GENERAL: PAINLEVEL: NO PAIN (0)

## 2017-03-30 NOTE — PROGRESS NOTES
SUBJECTIVE:                                                    Jimi Moreno is a 72 year old male who presents to clinic today for the following health issues:      Blood pressure follow       Problem list and histories reviewed & adjusted, as indicated.      Current Outpatient Prescriptions   Medication Sig Dispense Refill     lisinopril (PRINIVIL/ZESTRIL) 20 MG tablet Take 1 tablet (20 mg) by mouth daily 30 tablet 1     tamsulosin (FLOMAX) 0.4 MG capsule Take 1 capsule (0.4 mg) by mouth daily 90 capsule 4     finasteride (PROSCAR) 5 MG tablet Take 1 tablet (5 mg) by mouth daily 90 tablet 4       Ros: no chest pain, chest tightness   No sob   No leg edema   No abdominal pain     Denies fever or chills   Weight stable      NO  NEURO SYMPTOMS       O: /70  Pulse 65  Temp 97.4  F (36.3  C) (Oral)  Wt 236 lb (107 kg)  SpO2 95%  BMI 31.14 kg/m2    Head: Normocephalic, atraumatic.  Eyes: Conjunctiva clear, non icteric. PERRLA.  Ears: External ears and TMs normal BL.  Nose: Septum midline, nasal mucosa pink and moist. No discharge.  Mouth / Throat: Normal dentition.  No oral lesions. Pharynx non erythematous, tonsils without hypertrophy.  Neck: Supple, no enlarged LN, trachea midline.]    Vital Signs 1/26/2017 2/16/2017 2/16/2017 3/30/2017 3/30/2017   Systolic 170  145 142 130   Diastolic 91  85 82 70   Pulse 79  71 65    Temperature 97.9  98.1 97.4    Respirations        Weight (LB) 236 lb  237 lb 236 lb    Height        BMI        Pain  0  0    O2 95  95 95      Chest wall normal to inspection and palpation. Good excursion bilaterally. Lungs clear to auscultation. Good air movement bilaterally without rales, wheezes, or rhonchi.   Regular rate and  rhythm. S1 and S2 normal, no murmurs, clicks, gallops or rubs. No edema or JVD.        ICD-10-CM    1. Essential hypertension with goal blood pressure less than 140/90 I10 lisinopril (PRINIVIL/ZESTRIL) 20 MG tablet     Basic metabolic panel     Basic metabolic  panel

## 2017-03-30 NOTE — LETTER
[facility]   [address] [place]  [address]  [phone]                                   March 31, 2017    Jimi Moreno  [address] [facility] [address]        Dear Jimi,    Your basic metabolic panel which includes electrolytes,kidney function is normal and  -Glucose (diabetic screening test) is mildly  Elevated.  This is in the pre-diabetic range.  Watch you sugars.  Repeat in 1 year.     Results for orders placed or performed in visit on 03/30/17   Basic metabolic panel   Result Value Ref Range    Sodium 142 133 - 144 mmol/L    Potassium 4.4 3.4 - 5.3 mmol/L    Chloride 109 94 - 109 mmol/L    Carbon Dioxide 24 20 - 32 mmol/L    Anion Gap 9 3 - 14 mmol/L    Glucose 108 (H) 70 - 99 mg/dL    Urea Nitrogen 20 7 - 30 mg/dL    Creatinine 1.09 0.66 - 1.25 mg/dL    GFR Estimate 66 >60 mL/min/1.7m2    GFR Estimate If Black 80 >60 mL/min/1.7m2    Calcium 9.0 8.5 - 10.1 mg/dL       If you have any questions please call the clinic at [phone]    Sincerely,    Taco Talbert MD  bmd

## 2017-03-30 NOTE — NURSING NOTE
"Chief Complaint   Patient presents with     Hypertension     Follow up       Initial /82  Pulse 65  Temp 97.4  F (36.3  C) (Oral)  Wt 236 lb (107 kg)  SpO2 95%  BMI 31.14 kg/m2 Estimated body mass index is 31.14 kg/(m^2) as calculated from the following:    Height as of 1/24/17: 6' 1\" (1.854 m).    Weight as of this encounter: 236 lb (107 kg).  Medication Reconciliation: complete    "

## 2017-03-30 NOTE — MR AVS SNAPSHOT
"              After Visit Summary   3/30/2017    Jimi Moreno    MRN: [id number]           Patient Information     Date Of Birth          1945        Visit Information        Provider Department      3/30/2017 8:40 AM Taco Talbert MD [facility]        Today's Diagnoses     Essential hypertension with goal blood pressure less than 140/90           Follow-ups after your visit        Follow-up notes from your care team     Return in about 6 months (around 9/30/2017) for BP Recheck.      Future tests that were ordered for you today     Open Future Orders        Priority Expected Expires Ordered    Basic metabolic panel Routine 9/30/2017 3/30/2018 3/30/2017            Who to contact     If you have questions or need follow up information about today's clinic visit or your schedule please contact [facility] directly at [phone].  Normal or non-critical lab and imaging results will be communicated to you by MyChart, letter or phone within 4 business days after the clinic has received the results. If you do not hear from us within 7 days, please contact the clinic through MyChart or phone. If you have a critical or abnormal lab result, we will notify you by phone as soon as possible.  Submit refill requests through [employer] or call your pharmacy and they will forward the refill request to us. Please allow 3 business days for your refill to be completed.          Additional Information About Your Visit        MyChart Information     [employer] lets you send messages to your doctor, view your test results, renew your prescriptions, schedule appointments and more. To sign up, go to [email or web address] . Click on \"Log in\" on the left side of the screen, which will take you to the Welcome page. Then click on \"Sign up Now\" on the right side of the page.     You will be asked to enter the access code listed below, as well as some personal information. Please follow " the directions to create your username and password.     Your access code is: [email or web address]  Expires: 2017  1:12 PM     Your access code will  in 90 days. If you need help or a new code, please call your [facility] clinic or [phone].        Care EveryWhere ID     This is your Care EveryWhere ID. This could be used by other organizations to access your [facility] medical records  [id number]        Your Vitals Were     Pulse Temperature Pulse Oximetry BMI (Body Mass Index)          65 97.4  F (36.3  C) (Oral) 95% 31.14 kg/m2         Blood Pressure from Last 3 Encounters:   17 130/70   17 145/85   17 (!) 170/91    Weight from Last 3 Encounters:   17 236 lb (107 kg)   17 237 lb (107.5 kg)   17 236 lb (107 kg)              We Performed the Following     Basic metabolic panel          Where to get your medicines      These medications were sent to [facility] [address] [place] [address] [address]kw[address]  [address] [place] [address] [facility] [address]     Phone:  [phone]     lisinopril 20 MG tablet          Primary Care Provider Office Phone # Fax #    Taco Talbert [phone]       [facility] [address] [facility] [place] [address]        Thank you!     Thank you for choosing [facility]  for your care. Our goal is always to provide you with excellent care. Hearing back from our patients is one way we can continue to improve our services. Please take a few minutes to complete the written survey that you may receive in the mail after your visit with us. Thank you!             Your Updated Medication List - Protect others around you: Learn how to safely use, store and throw away your medicines at www.disposemymeds.org.          This list is accurate as of: 3/30/17  9:25 AM.  Always use your most recent med list.                   Brand Name Dispense Instructions for use    finasteride 5 MG tablet     PROSCAR    90 tablet    Take 1 tablet (5 mg) by mouth daily       lisinopril 20 MG tablet    PRINIVIL/ZESTRIL    90 tablet    Take 1 tablet (20 mg) by mouth daily       tamsulosin 0.4 MG capsule    FLOMAX    90 capsule    Take 1 capsule (0.4 mg) by mouth daily

## 2017-03-31 NOTE — PROGRESS NOTES
Your basic metabolic panel which includes electrolytes,kidney function is normal and  -Glucose (diabetic screening test) is mildly  Elevated.  This is in the pre-diabetic range.  Watch you sugars.  Repeat in 1 year.

## 2017-04-05 ENCOUNTER — TELEPHONE (OUTPATIENT)
Dept: FAMILY MEDICINE | Facility: CLINIC | Age: 72
End: 2017-04-05
Payer: COMMERCIAL

## 2017-04-05 NOTE — TELEPHONE ENCOUNTER
Reason for Call:  Other returning call    Detailed comments: Patient is returning a call back from the clinic.  He states he received a voicemail about 1 Hour ago from the clinic.    Phone Number Patient can be reached at: Home number on file [phone] (home)    Best Time: Any     Can we leave a detailed message on this number? YES    Call taken on 4/5/2017 at 2:22 PM by Brenna [place]

## 2017-04-19 NOTE — TELEPHONE ENCOUNTER
4/19/17-2nd call attempt, spoke with patient. He was out and about and will call us back to get the scheduling number for colonoscopy.  Mana Francis MA

## 2017-07-24 DIAGNOSIS — I10 ESSENTIAL HYPERTENSION WITH GOAL BLOOD PRESSURE LESS THAN 140/90: ICD-10-CM

## 2017-07-24 DIAGNOSIS — R97.20 ELEVATED PROSTATE SPECIFIC ANTIGEN (PSA): ICD-10-CM

## 2017-07-24 LAB
ANION GAP SERPL CALCULATED.3IONS-SCNC: 5 MMOL/L (ref 3–14)
BUN SERPL-MCNC: 18 MG/DL (ref 7–30)
CALCIUM SERPL-MCNC: 9.1 MG/DL (ref 8.5–10.1)
CHLORIDE SERPL-SCNC: 108 MMOL/L (ref 94–109)
CO2 SERPL-SCNC: 27 MMOL/L (ref 20–32)
CREAT SERPL-MCNC: 1.11 MG/DL (ref 0.66–1.25)
GFR SERPL CREATININE-BSD FRML MDRD: 65 ML/MIN/1.7M2
GLUCOSE SERPL-MCNC: 92 MG/DL (ref 70–99)
POTASSIUM SERPL-SCNC: 5 MMOL/L (ref 3.4–5.3)
PSA SERPL-MCNC: 2.37 UG/L (ref 0–4)
SODIUM SERPL-SCNC: 140 MMOL/L (ref 133–144)

## 2017-07-24 PROCEDURE — 36415 COLL VENOUS BLD VENIPUNCTURE: CPT | Performed by: INTERNAL MEDICINE

## 2017-07-24 PROCEDURE — 84153 ASSAY OF PSA TOTAL: CPT | Performed by: INTERNAL MEDICINE

## 2017-07-24 PROCEDURE — 80048 BASIC METABOLIC PNL TOTAL CA: CPT | Performed by: INTERNAL MEDICINE

## 2017-07-24 NOTE — LETTER
[facility]   [address] [place]  [address]  [phone]                                   July 25, 2017    Jimi Moreno  [address] [facility] [address]        Dear Jimi,    Your basic metabolic panel which includes electrolytes,kidney function is normal and  -Glucose (diabetic screening test) is within normal limits    Follow up in 1 year(s)    Results for orders placed or performed in visit on 07/24/17   PSA tumor marker   Result Value Ref Range    PSA 2.37 0 - 4 ug/L   Basic metabolic panel   Result Value Ref Range    Sodium 140 133 - 144 mmol/L    Potassium 5.0 3.4 - 5.3 mmol/L    Chloride 108 94 - 109 mmol/L    Carbon Dioxide 27 20 - 32 mmol/L    Anion Gap 5 3 - 14 mmol/L    Glucose 92 70 - 99 mg/dL    Urea Nitrogen 18 7 - 30 mg/dL    Creatinine 1.11 0.66 - 1.25 mg/dL    GFR Estimate 65 >60 mL/min/1.7m2    GFR Estimate If Black 78 >60 mL/min/1.7m2    Calcium 9.1 8.5 - 10.1 mg/dL       If you have any questions please call the clinic at [phone]    Sincerely,    Taco Talbert MD  bmd

## 2017-07-24 NOTE — LETTER
Jimi Moreno   [address] [facility] [address]        Dear Mr. Moreno:    I am writing you concerning your recent test results:    Results for orders placed or performed in visit on 07/24/17   PSA tumor marker   Result Value Ref Range    PSA 2.37 0 - 4 ug/L   Basic metabolic panel   Result Value Ref Range    Sodium 140 133 - 144 mmol/L    Potassium 5.0 3.4 - 5.3 mmol/L    Chloride 108 94 - 109 mmol/L    Carbon Dioxide 27 20 - 32 mmol/L    Anion Gap 5 3 - 14 mmol/L    Glucose 92 70 - 99 mg/dL    Urea Nitrogen 18 7 - 30 mg/dL    Creatinine 1.11 0.66 - 1.25 mg/dL    GFR Estimate 65 >60 mL/min/1.7m2    GFR Estimate If Black 78 >60 mL/min/1.7m2    Calcium 9.1 8.5 - 10.1 mg/dL         Your PSA is completely stable.  Your other labs are in the normal range as well.     Please let me know if you have any questions.      Sincerely,      Brianda Nova PA-C  Physician Assistant Urology        [address] [address]  Phone: [phone]

## 2017-07-25 NOTE — PROGRESS NOTES
Your basic metabolic panel which includes electrolytes,kidney function is normal and  -Glucose (diabetic screening test) is within normal limits    Follow up in 1 year(s)

## 2017-07-31 ENCOUNTER — PRE VISIT (OUTPATIENT)
Dept: UROLOGY | Facility: CLINIC | Age: 72
End: 2017-07-31

## 2017-08-08 ENCOUNTER — OFFICE VISIT (OUTPATIENT)
Dept: UROLOGY | Facility: CLINIC | Age: 72
End: 2017-08-08

## 2017-08-08 VITALS
HEART RATE: 72 BPM | BODY MASS INDEX: 31.78 KG/M2 | HEIGHT: 73 IN | DIASTOLIC BLOOD PRESSURE: 86 MMHG | WEIGHT: 239.8 LBS | SYSTOLIC BLOOD PRESSURE: 147 MMHG

## 2017-08-08 DIAGNOSIS — N40.0 ENLARGED PROSTATE: ICD-10-CM

## 2017-08-08 DIAGNOSIS — N40.1 BENIGN PROSTATIC HYPERPLASIA WITH LOWER URINARY TRACT SYMPTOMS, SYMPTOM DETAILS UNSPECIFIED: Primary | ICD-10-CM

## 2017-08-08 DIAGNOSIS — I10 ESSENTIAL HYPERTENSION WITH GOAL BLOOD PRESSURE LESS THAN 140/90: ICD-10-CM

## 2017-08-08 RX ORDER — FINASTERIDE 5 MG/1
1 TABLET, FILM COATED ORAL DAILY
Qty: 90 TABLET | Refills: 4 | Status: SHIPPED | OUTPATIENT
Start: 2017-08-08 | End: 2018-10-20

## 2017-08-08 RX ORDER — TAMSULOSIN HYDROCHLORIDE 0.4 MG/1
0.4 CAPSULE ORAL DAILY
Qty: 90 CAPSULE | Refills: 4 | Status: SHIPPED | OUTPATIENT
Start: 2017-08-08 | End: 2018-10-20

## 2017-08-08 ASSESSMENT — PAIN SCALES - GENERAL: PAINLEVEL: NO PAIN (0)

## 2017-08-08 NOTE — PATIENT INSTRUCTIONS
- Avoid caffeine prior to leaving the house (or if you know you will not have a toilet near you)  - Continue finasteride and flomax  - For blood pressure.  Continue taking it at home.  Followup with your primary care doctor if still high.  Exercise a little each day.  If you could lose 5-10 lbs that would make a big difference.  Reduce salt - this will make a huge difference.  The number one way to protect your kidneys is by keeping blood pressure under control.    - Return in 1 year for a PSA / exam    Connie Nova. DAVID

## 2017-08-08 NOTE — MR AVS SNAPSHOT
After Visit Summary   8/8/2017    Jimi Moreno    MRN: [id number]           Patient Information     Date Of Birth          1945        Visit Information        Provider Department      8/8/2017 1:00 PM Brianda Nova PA M [facility]rology and [facility]        Today's Diagnoses     Benign prostatic hyperplasia with lower urinary tract symptoms, symptom details unspecified    -  1    Essential hypertension with goal blood pressure less than 140/90        Enlarged prostate          Care Instructions    - Avoid caffeine prior to leaving the house (or if you know you will not have a toilet near you)  - Continue finasteride and flomax  - For blood pressure.  Continue taking it at home.  Followup with your primary care doctor if still high.  Exercise a little each day.  If you could lose 5-10 lbs that would make a big difference.  Reduce salt - this will make a huge difference.  The number one way to protect your kidneys is by keeping blood pressure under control.    - Return in 1 year for a PSA / exam    Connie Yang PA-C          Follow-ups after your visit        Who to contact     Please call your clinic at [phone] to:    Ask questions about your health    Make or cancel appointments    Discuss your medicines    Learn about your test results    Speak to your doctor   If you have compliments or concerns about an experience at your clinic, or if you wish to file a complaint, please contact [facility] Patient Relations at [phone] or email us at [email or web address]         Additional Information About Your Visit        [employer] Information     [employer] is an electronic gateway that provides easy, online access to your medical records. With [employer], you can request a clinic appointment, read your test results, renew a prescription or communicate with your care team.     To sign up for [employer] visit the website at  "[email or web address]   You will be asked to enter the access code listed below, as well as some personal information. Please follow the directions to create your username and password.     Your access code is: [email or web address]  Expires: 10/23/2017  6:30 AM     Your access code will  in 90 days. If you need help or a new code, please contact your [facility] or call [phone] for assistance.        Care EveryWhere ID     This is your Care EveryWhere ID. This could be used by other organizations to access your [facility] medical records  [id number]        Your Vitals Were     Pulse Height BMI (Body Mass Index)             76 1.854 m (6' 1\") 31.64 kg/m2          Blood Pressure from Last 3 Encounters:   17 174/87   17 130/70   17 145/85    Weight from Last 3 Encounters:   17 108.8 kg (239 lb 12.8 oz)   17 107 kg (236 lb)   17 107.5 kg (237 lb)              Today, you had the following     No orders found for display         Where to get your medicines      These medications were sent to [facility] [address] [place] [address]     Phone:  [phone]     finasteride 5 MG tablet    tamsulosin 0.4 MG capsule          Primary Care Provider Office Phone # Fax #    Taco Talbert [phone]       [facility] [address] [facility] [address]        Equal Access to Services     [facility] CenterKALIA : Hadii aad ku hadasho Soomaali, waaxda luqadaha, qaybta kaalmada joanne, colton johnston. So [facility] [phone].    ATENCIÓN: Si habla español, tiene a west disposición servicios gratuitos de asistencia lingüística. Llame al [phone].    We comply with applicable federal civil rights laws and [place] laws. We do not discriminate on the basis of race, color, national origin, age, disability sex, sexual orientation or " gender identity.            Thank you!     Thank you for choosing [facility] UROLOGY AND [facility]  for your care. Our goal is always to provide you with excellent care. Hearing back from our patients is one way we can continue to improve our services. Please take a few minutes to complete the written survey that you may receive in the mail after your visit with us. Thank you!             Your Updated Medication List - Protect others around you: Learn how to safely use, store and throw away your medicines at www.disposemymeds.org.          This list is accurate as of: 8/8/17  1:51 PM.  Always use your most recent med list.                   Brand Name Dispense Instructions for use Diagnosis    finasteride 5 MG tablet    PROSCAR    90 tablet    Take 1 tablet (5 mg) by mouth daily    Enlarged prostate       lisinopril 20 MG tablet    PRINIVIL/ZESTRIL    90 tablet    Take 1 tablet (20 mg) by mouth daily    Essential hypertension with goal blood pressure less than 140/90       tamsulosin 0.4 MG capsule    FLOMAX    90 capsule    Take 1 capsule (0.4 mg) by mouth daily    Benign prostatic hyperplasia with lower urinary tract symptoms, symptom details unspecified

## 2017-08-08 NOTE — PROGRESS NOTES
"HPI: Mr. Jimi Moreno is a 72 year old year old male presenting today for evaluation of chief complaint(s): RECHECK (BPH/LUTS follow up)    Mr. Moreno is a 72M is PMH significant for HTN and BPH/luts.  He was last seen on 12/29/15 by Dr. March.  At that time he had history of BPH on finasteride/flomax as well as elevated PSA.  Has never had a biopsy. No FH prostate cancer.     He was last seen in Urology Service by me on 1/23/17.  His SUKHDEEP was normal on that date.  His uroflow showed 97mL void volume with 203mL PVR.  The Qmax was 8mL/second.  Flow pattern was an early peak, then platteau then postvoid dribbling. When I saw him in Clinic he had run out of flomax and his voiding symptoms were quite bothersome - IPSS was 32 \"unhappy.\"  His PSA was 2.72ng/dL (on finasteride).  UA was WNL. We restarted the flomax and continued finasteride.  He returns today in routine followup.      He is doing great on the finasteride/ flomax.  IPSS is 9 today, \"pleased.\"  Denies dysuria, hematuria.  Does have some mild urgency, especially with standing (after sitting awhile) or with brushing his teeth.     Constipation - usually none.  But when on opiates for back surgery (4 months ago) had constipation.  Stools are loose.     REVIEW OF DIAGNOSTICS:  07/24/17 - PSA 2.37ng/dL   01/23/17 - UA negative  01/23/17: 2.72ng/mL (x2 would be 5.44)   12/23/15: 1.81ng/mL  03/24/15 - started finasteride  01/13/15: 4.84ng/mL  01/13/15: UA negative  09/02/09: 3.39ng/mL     PMHx - Has been started on lisinopril for HTN.  Initially 10mg daily.  Now 20mg.  Does try to limit sodium.  Doesn't exercise.  Concerned today because SBP is 174 - too high.   PSxHx- No surgeries - \"4 stitches\" in his lifetime  SHx - worked as a , .  Never smoker.    FHx - no FH prostate cancer       Current Outpatient Prescriptions   Medication Sig Dispense Refill     lisinopril (PRINIVIL/ZESTRIL) 20 MG tablet Take 1 tablet (20 mg) by mouth daily 90 tablet 3 " "    tamsulosin (FLOMAX) 0.4 MG capsule Take 1 capsule (0.4 mg) by mouth daily 90 capsule 4     finasteride (PROSCAR) 5 MG tablet Take 1 tablet (5 mg) by mouth daily 90 tablet 4       ALLERGIES: Pollen extract/tree extract      REVIEW OF SYSTEMS:  As above in HPI    GENERAL PHYSICAL EXAM:   Vitals: /87  Pulse 76  Ht 1.854 m (6' 1\")  Wt 108.8 kg (239 lb 12.8 oz)  BMI 31.64 kg/m2  Body mass index is 31.64 kg/(m^2).    GENERAL: Well groomed, well developed, well nourished male in NAD.  HNEURO: Alert and oriented x 3.  PSYCH: Normal mood and affect, pleasant and agreeable during interview and exam.    LABS: The last test results for Mr. Jimi Moreno were reviewed:  PSA -   Lab Results   Component Value Date    PSA 2.37 07/24/2017    PSA 2.72 01/23/2017    PSA 1.81 12/23/2015    PSA 4.84 01/13/2015    PSA 3.39 09/02/2009     BMP -   Recent Labs   Lab Test  07/24/17   1004  03/30/17   0928  01/26/17   1023   09/02/09   0918   NA  140  142  141   < >  139   POTASSIUM  5.0  4.4  4.4   < >  4.5   CHLORIDE  108  109  109   < >  104   CO2  27  24  24   < >  27   BUN  18  20  17   < >  17   CR  1.11  1.09  1.04   < >  1.07   GLC  92  108*  97   < >  101*   MELIDA  9.1  9.0  8.6   < >  8.8   PHOS   --    --    --    --   2.5    < > = values in this interval not displayed.       CBC -   Recent Labs   Lab Test  04/17/14   1450   WBC  6.2   HGB  16.7   PLT  180       ASSESSMENT:   1) BPH with luts / h/o incomplete emptying  2) overactive bladder  3) hypertension    PLAN:   - renal fxn remains WNL  - Avoid caffeine prior to leaving the house (or if you know you will not have a toilet near you)  - Continue finasteride and flomax  - For blood pressure.  Continue taking it at home.  Followup with your primary care doctor if still high.  Exercise a little each day.  If you could lose 5-10 lbs that would make a big difference.  Reduce salt - this will make a huge difference.  The number one way to protect your kidneys is by " keeping blood pressure under control.    - Return in 1 year for a PSA / exam    Connie Nova PA-C  Department of Urologic Surgery

## 2017-08-08 NOTE — NURSING NOTE
"Chief Complaint   Patient presents with     RECHECK     BPH/LUTS follow up       Initial Ht 1.854 m (6' 1\")  Wt 108.8 kg (239 lb 12.8 oz)  BMI 31.64 kg/m2 Estimated body mass index is 31.64 kg/(m^2) as calculated from the following:    Height as of this encounter: 1.854 m (6' 1\").    Weight as of this encounter: 108.8 kg (239 lb 12.8 oz).  Medication Reconciliation: complete     PARKER Stephens    "

## 2017-09-21 ENCOUNTER — TELEPHONE (OUTPATIENT)
Dept: FAMILY MEDICINE | Facility: CLINIC | Age: 72
End: 2017-09-21

## 2017-09-21 DIAGNOSIS — Z12.11 SPECIAL SCREENING FOR MALIGNANT NEOPLASMS, COLON: Primary | ICD-10-CM

## 2017-09-21 NOTE — LETTER
September 21, 2017    Jimi Moreno  [address] [facility] [address]    Dear Jimi    We care about your health and have reviewed your health plan. We have reviewed your medical conditions, medication list, and lab results and are making recommendations based on this review, to better manage your health.    You are in particular need of attention regarding:  - Scheduling a Colon Cancer Screening (Colonoscopy only) [phone]      Here is a list of Health Maintenance topics that are due now or due soon:  Health Maintenance Due   Topic Date Due     TETANUS IMMUNIZATION (SYSTEM ASSIGNED)  01/05/1961     ADVANCE DIRECTIVE PLANNING Q5 YRS  01/05/1998     PNEUMOCOCCAL (1 of 2 - PCV13) 01/05/2008     AORTIC ANEURYSM SCREENING (SYSTEM ASSIGNED)  01/05/2008     LIPID SCREEN Q5 YR MALE (SYSTEM ASSIGNED)  09/02/2014     COLON CANCER SCREEN (SYSTEM ASSIGNED)  01/19/2015     INFLUENZA VACCINE (SYSTEM ASSIGNED)  09/01/2017     We will be calling you in the next couple of weeks to help you schedule any appointments that are needed.  Please call us at [phone] (or use [employer]) to address the above recommendations.     Thank you for trusting [facility] and we appreciate the opportunity to serve you.  We look forward to supporting your healthcare needs in the future.    Healthy Regards,    Your [facility] Team/MSX

## 2017-09-21 NOTE — TELEPHONE ENCOUNTER
Panel Management Review      Patient has the following on his problem list:     Hypertension   Last three blood pressure readings:  BP Readings from Last 3 Encounters:   08/08/17 147/86   03/30/17 130/70   02/16/17 145/85     Blood pressure: FAILED    HTN Guidelines:  Age 18-59 BP range:  Less than 140/90  Age 60-85 with Diabetes:  Less than 140/90  Age 60-85 without Diabetes:  less than 150/90        Composite cancer screening  Chart review shows that this patient is due/due soon for the following Colonoscopy  Summary:    Patient is due/failing the following:   COLONOSCOPY    Action needed:   Patient needs referral/order: Colonoscopy order pended    Type of outreach:    Sent letter.    Questions for provider review:    None                                                                                                                                    Denita See YOANNA Portillo     Chart routed to Care Team .

## 2017-10-05 NOTE — TELEPHONE ENCOUNTER
Called pt and spoke to him about completing a colonoscopy. Pt stated he suppose he should have it done. Routing to PCP to sign order.  Denita See YOANNA Portillo

## 2017-11-02 ENCOUNTER — SURGERY (OUTPATIENT)
Age: 72
End: 2017-11-02

## 2017-11-02 ENCOUNTER — HOSPITAL ENCOUNTER (OUTPATIENT)
Facility: AMBULATORY SURGERY CENTER | Age: 72
Discharge: HOME | End: 2017-11-02
Attending: SURGERY | Admitting: SURGERY
Payer: COMMERCIAL

## 2017-11-02 VITALS
DIASTOLIC BLOOD PRESSURE: 104 MMHG | TEMPERATURE: 97.7 F | RESPIRATION RATE: 16 BRPM | OXYGEN SATURATION: 95 % | SYSTOLIC BLOOD PRESSURE: 131 MMHG

## 2017-11-02 PROCEDURE — G8918 PT W/O PREOP ORDER IV AB PRO: HCPCS

## 2017-11-02 PROCEDURE — G8907 PT DOC NO EVENTS ON DISCHARG: HCPCS

## 2017-11-02 PROCEDURE — 45385 COLONOSCOPY W/LESION REMOVAL: CPT

## 2017-11-02 PROCEDURE — 88305 TISSUE EXAM BY PATHOLOGIST: CPT | Performed by: SURGERY

## 2017-11-02 PROCEDURE — G0500 MOD SEDAT ENDO SERVICE >5YRS: HCPCS | Performed by: SURGERY

## 2017-11-02 PROCEDURE — 45385 COLONOSCOPY W/LESION REMOVAL: CPT | Mod: PT | Performed by: SURGERY

## 2017-11-02 RX ORDER — FENTANYL CITRATE 50 UG/ML
INJECTION, SOLUTION INTRAMUSCULAR; INTRAVENOUS PRN
Status: DISCONTINUED | OUTPATIENT
Start: 2017-11-02 | End: 2017-11-02 | Stop reason: HOSPADM

## 2017-11-02 RX ORDER — LIDOCAINE 40 MG/G
CREAM TOPICAL
Status: DISCONTINUED | OUTPATIENT
Start: 2017-11-02 | End: 2017-11-03 | Stop reason: HOSPADM

## 2017-11-02 RX ADMIN — FENTANYL CITRATE 50 MCG: 50 INJECTION, SOLUTION INTRAMUSCULAR; INTRAVENOUS at 10:10

## 2017-11-02 RX ADMIN — FENTANYL CITRATE 100 MCG: 50 INJECTION, SOLUTION INTRAMUSCULAR; INTRAVENOUS at 10:08

## 2017-11-03 LAB — COPATH REPORT: NORMAL

## 2017-11-14 LAB — COLONOSCOPY: NORMAL

## 2018-04-14 DIAGNOSIS — I10 ESSENTIAL HYPERTENSION WITH GOAL BLOOD PRESSURE LESS THAN 140/90: ICD-10-CM

## 2018-04-16 RX ORDER — LISINOPRIL 20 MG/1
TABLET ORAL
Qty: 30 TABLET | Refills: 0 | Status: SHIPPED | OUTPATIENT
Start: 2018-04-16 | End: 2018-05-01

## 2018-04-16 NOTE — TELEPHONE ENCOUNTER
"Requested Prescriptions   Pending Prescriptions Disp Refills     lisinopril (PRINIVIL/ZESTRIL) 20 MG tablet [Pharmacy Med Name: LISINOPRIL 20MG     TAB] 90 tablet 3    Last Written Prescription Date:  3/30/17  Last Fill Quantity: 90,  # refills: 3   Last office visit: 3/30/2017 with prescribing provider:     Future Office Visit:     Sig: TAKE ONE TABLET BY MOUTH ONCE DAILY    ACE Inhibitors (Including Combos) Protocol Failed    4/14/2018 12:00 PM       Failed - Blood pressure under 140/90 in past 12 months    BP Readings from Last 3 Encounters:   11/02/17 (!) 131/104   08/08/17 147/86   03/30/17 130/70                Failed - Recent (12 mo) or future (30 days) visit within the authorizing provider's specialty    Patient had office visit in the last 12 months or has a visit in the next 30 days with authorizing provider or within the authorizing provider's specialty.  See \"Patient Info\" tab in inbasket, or \"Choose Columns\" in Meds & Orders section of the refill encounter.           Passed - Patient is age 18 or older       Passed - Normal serum creatinine on file in past 12 months    Recent Labs   Lab Test  07/24/17   1004   CR  1.11            Passed - Normal serum potassium on file in past 12 months    Recent Labs   Lab Test  07/24/17   1004   POTASSIUM  5.0               "

## 2018-04-16 NOTE — TELEPHONE ENCOUNTER
Patient was last seen 3/30/17 and advised to return for BP check in September.    BP Readings from Last 3 Encounters:   11/02/17 (!) 131/104   08/08/17 147/86   03/30/17 130/70     Medication is being filled for 1 time refill only due to:  Patient needs to be seen because it has been more than one year since last visit.     Encounter routed to team to reach out to patient to schedule.        Prema Dumont RN  [facility]

## 2018-04-30 NOTE — PROGRESS NOTES
"  SUBJECTIVE:   Jimi Moreno is a 73 year old male who presents to clinic today for the following health issues:      Medication Followup and refills of Lisinopril    Taking Medication as prescribed: {.:875921::\"yes\"}    Side Effects:  {NONEORCHOOSE:248341::\"None\"}    Medication Helping Symptoms:  {.:278373::\"yes\"}     Problem list and histories reviewed & adjusted, as indicated.  Additional history: {NONE - AS DOCUMENTED:758510::\"as documented\"}    {HIST REVIEW/ LINKS 2:009399}    Reviewed and updated as needed this visit by clinical staff       Reviewed and updated as needed this visit by Provider         {PROVIDER CHARTING PREFERENCE:909438}    "

## 2018-05-01 ENCOUNTER — OFFICE VISIT (OUTPATIENT)
Dept: FAMILY MEDICINE | Facility: CLINIC | Age: 73
End: 2018-05-01
Payer: COMMERCIAL

## 2018-05-01 VITALS
WEIGHT: 233 LBS | BODY MASS INDEX: 30.88 KG/M2 | HEART RATE: 66 BPM | RESPIRATION RATE: 12 BRPM | HEIGHT: 73 IN | DIASTOLIC BLOOD PRESSURE: 74 MMHG | SYSTOLIC BLOOD PRESSURE: 120 MMHG | TEMPERATURE: 97 F | OXYGEN SATURATION: 96 %

## 2018-05-01 DIAGNOSIS — I10 ESSENTIAL HYPERTENSION WITH GOAL BLOOD PRESSURE LESS THAN 140/90: ICD-10-CM

## 2018-05-01 PROCEDURE — 99213 OFFICE O/P EST LOW 20 MIN: CPT | Performed by: FAMILY MEDICINE

## 2018-05-01 RX ORDER — LISINOPRIL 20 MG/1
20 TABLET ORAL DAILY
Qty: 90 TABLET | Refills: 3 | Status: SHIPPED | OUTPATIENT
Start: 2018-05-01 | End: 2019-05-24

## 2018-05-01 NOTE — MR AVS SNAPSHOT
"              After Visit Summary   5/1/2018    Jimi Moreno    MRN: [id number]           Patient Information     Date Of Birth          1945        Visit Information        Provider Department      5/1/2018 9:40 AM Taco Talbert MD [facility]        Today's Diagnoses     Essential hypertension with goal blood pressure less than 140/90           Follow-ups after your visit        Follow-up notes from your care team     Return in about 6 months (around 11/1/2018) for BP Recheck.      Future tests that were ordered for you today     Open Future Orders        Priority Expected Expires Ordered    Lipid panel reflex to direct LDL Fasting Routine 5/2/2018 5/1/2019 5/1/2018    Basic metabolic panel Routine 5/2/2018 5/1/2019 5/1/2018            Who to contact     If you have questions or need follow up information about today's clinic visit or your schedule please contact [facility] directly at [phone].  Normal or non-critical lab and imaging results will be communicated to you by MyChart, letter or phone within 4 business days after the clinic has received the results. If you do not hear from us within 7 days, please contact the clinic through Multiplicomhart or phone. If you have a critical or abnormal lab result, we will notify you by phone as soon as possible.  Submit refill requests through Offerial or call your pharmacy and they will forward the refill request to us. Please allow 3 business days for your refill to be completed.          Additional Information About Your Visit        MyChart Information     Offerial lets you send messages to your doctor, view your test results, renew your prescriptions, schedule appointments and more. To sign up, go to [email or web address] . Click on \"Log in\" on the left side of the screen, which will take you to the Welcome page. Then click on \"Sign up Now\" on the right side of the page.     You will be asked to enter the " "access code listed below, as well as some personal information. Please follow the directions to create your username and password.     Your access code is: [email or web address]  Expires: 2018 10:17 AM     Your access code will  in 90 days. If you need help or a new code, please call your [facility] clinic or [phone].        Care EveryWhere ID     This is your Care EveryWhere ID. This could be used by other organizations to access your [facility] medical records  [id number]        Your Vitals Were     Pulse Temperature Respirations Height Pulse Oximetry BMI (Body Mass Index)    66 97  F (36.1  C) 12 6' 1\" (1.854 m) 96% 30.74 kg/m2       Blood Pressure from Last 3 Encounters:   18 120/74   17 (!) 131/104   17 147/86    Weight from Last 3 Encounters:   18 233 lb (105.7 kg)   17 239 lb 12.8 oz (108.8 kg)   17 236 lb (107 kg)                 Today's Medication Changes          These changes are accurate as of 18 10:17 AM.  If you have any questions, ask your nurse or doctor.               These medicines have changed or have updated prescriptions.        Dose/Directions    lisinopril 20 MG tablet   Commonly known as:  PRINIVIL/ZESTRIL   This may have changed:  See the new instructions.   Used for:  Essential hypertension with goal blood pressure less than 140/90   Changed by:  Taco Talbert MD        Dose:  20 mg   Take 1 tablet (20 mg) by mouth daily   Quantity:  90 tablet   Refills:  3            Where to get your medicines      These medications were sent to [facility] [address] [place] [address] [address] [place] [address] [facility] [address]     Phone:  [phone]     lisinopril 20 MG tablet                Primary Care Provider Office Phone # Fax #    Taco Talbert [phone]       [address] [facility] [address]        Equal Access to Services     RUPINDER LOPEZ AH: angus James " radha charlestomas watsonalec elhamcolton nick. So [facility] [phone].    ATENCIÓN: Si hannah briceno, tiene a west disposición servicios gratuitos de asistencia lingüística. Kareem al [phone].    We comply with applicable federal civil rights laws and [place] laws. We do not discriminate on the basis of race, color, national origin, age, disability, sex, sexual orientation, or gender identity.            Thank you!     Thank you for choosing [facility]  for your care. Our goal is always to provide you with excellent care. Hearing back from our patients is one way we can continue to improve our services. Please take a few minutes to complete the written survey that you may receive in the mail after your visit with us. Thank you!             Your Updated Medication List - Protect others around you: Learn how to safely use, store and throw away your medicines at www.disposemymeds.org.          This list is accurate as of 5/1/18 10:17 AM.  Always use your most recent med list.                   Brand Name Dispense Instructions for use Diagnosis    finasteride 5 MG tablet    PROSCAR    90 tablet    Take 1 tablet (5 mg) by mouth daily    Enlarged prostate       lisinopril 20 MG tablet    PRINIVIL/ZESTRIL    90 tablet    Take 1 tablet (20 mg) by mouth daily    Essential hypertension with goal blood pressure less than 140/90       tamsulosin 0.4 MG capsule    FLOMAX    90 capsule    Take 1 capsule (0.4 mg) by mouth daily    Benign prostatic hyperplasia with lower urinary tract symptoms, symptom details unspecified

## 2018-05-01 NOTE — PROGRESS NOTES
"  SUBJECTIVE:   Jimi Moreno is a 73 year old male who presents to clinic today for the following health issues:      Hypertension Follow-up      Outpatient blood pressures are being checked at home.  Results are 120/78 and 148/?.    Low Salt Diet: no added salt      Amount of exercise or physical activity: walking    Problems taking medications regularly: No    Medication side effects: none    Diet: regular (no restrictions)    Patient ok with his urinary problems     O: /74  Pulse 66  Temp 97  F (36.1  C)  Resp 12  Ht 6' 1\" (1.854 m)  Wt 233 lb (105.7 kg)  SpO2 96%  BMI 30.74 kg/m2    Head: Normocephalic, atraumatic.  Eyes: Conjunctiva clear, non icteric. PERRLA.  Ears: External ears and TMs normal BL.  Nose: Septum midline, nasal mucosa pink and moist. No discharge.  Mouth / Throat: Normal dentition.  No oral lesions. Pharynx non erythematous, tonsils without hypertrophy.  Neck: Supple, no enlarged LN, trachea midline.    No bruits in the neck     Chest wall normal to inspection and palpation. Good excursion bilaterally. Lungs clear to auscultation. Good air movement bilaterally without rales, wheezes, or rhonchi.   Regular rate and  rhythm. S1 and S2 normal, no murmurs, clicks, gallops or rubs. No edema or JVD.    The abdomen is soft without tenderness, guarding, mass, rebound or organomegaly. Bowel sounds are normal. No CVA tenderness or inguinal adenopathy noted.        ICD-10-CM    1. Essential hypertension with goal blood pressure less than 140/90 I10 lisinopril (PRINIVIL/ZESTRIL) 20 MG tablet     Lipid panel reflex to direct LDL Fasting     Basic metabolic panel       Recheck in 6 months           "

## 2018-05-22 DIAGNOSIS — N40.1 BENIGN PROSTATIC HYPERPLASIA WITH LOWER URINARY TRACT SYMPTOMS, SYMPTOM DETAILS UNSPECIFIED: ICD-10-CM

## 2018-05-22 DIAGNOSIS — I10 ESSENTIAL HYPERTENSION WITH GOAL BLOOD PRESSURE LESS THAN 140/90: ICD-10-CM

## 2018-05-22 LAB
ANION GAP SERPL CALCULATED.3IONS-SCNC: 8 MMOL/L (ref 3–14)
BUN SERPL-MCNC: 16 MG/DL (ref 7–30)
CALCIUM SERPL-MCNC: 8.5 MG/DL (ref 8.5–10.1)
CHLORIDE SERPL-SCNC: 108 MMOL/L (ref 94–109)
CHOLEST SERPL-MCNC: 150 MG/DL
CO2 SERPL-SCNC: 27 MMOL/L (ref 20–32)
CREAT SERPL-MCNC: 1.11 MG/DL (ref 0.66–1.25)
GFR SERPL CREATININE-BSD FRML MDRD: 65 ML/MIN/1.7M2
GLUCOSE SERPL-MCNC: 103 MG/DL (ref 70–99)
HDLC SERPL-MCNC: 47 MG/DL
LDLC SERPL CALC-MCNC: 77 MG/DL
NONHDLC SERPL-MCNC: 103 MG/DL
POTASSIUM SERPL-SCNC: 4.1 MMOL/L (ref 3.4–5.3)
PSA SERPL-MCNC: 1.77 UG/L (ref 0–4)
SODIUM SERPL-SCNC: 143 MMOL/L (ref 133–144)
TRIGL SERPL-MCNC: 128 MG/DL

## 2018-05-22 PROCEDURE — 84153 ASSAY OF PSA TOTAL: CPT | Performed by: INTERNAL MEDICINE

## 2018-05-22 PROCEDURE — 80048 BASIC METABOLIC PNL TOTAL CA: CPT | Performed by: INTERNAL MEDICINE

## 2018-05-22 PROCEDURE — 80061 LIPID PANEL: CPT | Performed by: INTERNAL MEDICINE

## 2018-05-22 PROCEDURE — 36415 COLL VENOUS BLD VENIPUNCTURE: CPT | Performed by: INTERNAL MEDICINE

## 2018-05-22 NOTE — LETTER
[facility]   [address] [place]  [address]  [phone]                                   May 23, 2018    Jimi Moreno  [address] [facility] [address]        Dear Jimi,    Your basic metabolic panel which includes electrolytes,kidney function is normal and  -Glucose (diabetic screening test) is very mildly  Elevated.   Your cholesterols are normal   Recheck in 6 months     Results for orders placed or performed in visit on 05/22/18   PSA tumor marker   Result Value Ref Range    PSA 1.77 0 - 4 ug/L   Lipid panel reflex to direct LDL Fasting   Result Value Ref Range    Cholesterol 150 <200 mg/dL    Triglycerides 128 <150 mg/dL    HDL Cholesterol 47 >39 mg/dL    LDL Cholesterol Calculated 77 <100 mg/dL    Non HDL Cholesterol 103 <130 mg/dL   Basic metabolic panel   Result Value Ref Range    Sodium 143 133 - 144 mmol/L    Potassium 4.1 3.4 - 5.3 mmol/L    Chloride 108 94 - 109 mmol/L    Carbon Dioxide 27 20 - 32 mmol/L    Anion Gap 8 3 - 14 mmol/L    Glucose 103 (H) 70 - 99 mg/dL    Urea Nitrogen 16 7 - 30 mg/dL    Creatinine 1.11 0.66 - 1.25 mg/dL    GFR Estimate 65 >60 mL/min/1.7m2    GFR Estimate If Black 78 >60 mL/min/1.7m2    Calcium 8.5 8.5 - 10.1 mg/dL   If you have any questions please call the clinic at [phone]    Sincerely,    Taco Talbert MD  bmd

## 2018-05-22 NOTE — LETTER
Jimi VELEZ Tiffanie   [address] [facility] [address]        Dear Mr. Moreno:    I am writing you concerning your recent test results:      Resulted Orders   PSA tumor marker   Result Value Ref Range    PSA 1.77 0 - 4 ug/L      Comment:      Assay Method:  Chemiluminescence using Siemens Vista analyzer   Lipid panel reflex to direct LDL Fasting   Result Value Ref Range    Cholesterol 150 <200 mg/dL    Triglycerides 128 <150 mg/dL      Comment:      Fasting specimen    HDL Cholesterol 47 >39 mg/dL    LDL Cholesterol Calculated 77 <100 mg/dL      Comment:      Desirable:       <100 mg/dl    Non HDL Cholesterol 103 <130 mg/dL   Basic metabolic panel   Result Value Ref Range    Sodium 143 133 - 144 mmol/L    Potassium 4.1 3.4 - 5.3 mmol/L    Chloride 108 94 - 109 mmol/L    Carbon Dioxide 27 20 - 32 mmol/L    Anion Gap 8 3 - 14 mmol/L    Glucose 103 (H) 70 - 99 mg/dL      Comment:      Fasting specimen    Urea Nitrogen 16 7 - 30 mg/dL    Creatinine 1.11 0.66 - 1.25 mg/dL    GFR Estimate 65 >60 mL/min/1.7m2      Comment:      Non  GFR Calc    GFR Estimate If Black 78 >60 mL/min/1.7m2      Comment:       GFR Calc    Calcium 8.5 8.5 - 10.1 mg/dL       Your PSA looks perfect.  It is even a little lower than it was last time we checked it.     Your other labs look appropriate as well, although I suspect your primary care provider will be contacting you independently about those results.     Please let me know if you have any questions.      Sincerely,          Brianda Nova PA-C  Physician Assistant Urology        [address] [address]  Phone: [phone]

## 2018-05-22 NOTE — PROGRESS NOTES
Your basic metabolic panel which includes electrolytes,kidney function is normal and  -Glucose (diabetic screening test) is very mildly  Elevated.   Your cholesterols are normal   Recheck in 6 months

## 2018-10-20 DIAGNOSIS — N40.1 BENIGN PROSTATIC HYPERPLASIA WITH LOWER URINARY TRACT SYMPTOMS, SYMPTOM DETAILS UNSPECIFIED: ICD-10-CM

## 2018-10-20 DIAGNOSIS — N40.0 ENLARGED PROSTATE: ICD-10-CM

## 2018-10-22 RX ORDER — FINASTERIDE 5 MG/1
1 TABLET, FILM COATED ORAL DAILY
Qty: 30 TABLET | Refills: 0 | Status: SHIPPED | OUTPATIENT
Start: 2018-10-22 | End: 2018-11-21

## 2018-10-22 RX ORDER — TAMSULOSIN HYDROCHLORIDE 0.4 MG/1
0.4 CAPSULE ORAL DAILY
Qty: 30 CAPSULE | Refills: 0 | Status: SHIPPED | OUTPATIENT
Start: 2018-10-22 | End: 2018-11-21

## 2018-10-22 NOTE — TELEPHONE ENCOUNTER
Last Clinic Visit:  8/8/17       NV: NONE  30 DAY RF   * NEEDS APPT.   Scheduling has been notified to contact the pt for appointment

## 2018-11-21 ENCOUNTER — OFFICE VISIT (OUTPATIENT)
Dept: UROLOGY | Facility: CLINIC | Age: 73
End: 2018-11-21
Payer: COMMERCIAL

## 2018-11-21 VITALS
BODY MASS INDEX: 31.49 KG/M2 | HEART RATE: 68 BPM | SYSTOLIC BLOOD PRESSURE: 149 MMHG | DIASTOLIC BLOOD PRESSURE: 88 MMHG | HEIGHT: 73 IN | WEIGHT: 237.6 LBS

## 2018-11-21 DIAGNOSIS — N13.8 BENIGN PROSTATIC HYPERPLASIA WITH URINARY OBSTRUCTION: Primary | ICD-10-CM

## 2018-11-21 DIAGNOSIS — N40.1 BENIGN PROSTATIC HYPERPLASIA WITH URINARY OBSTRUCTION: Primary | ICD-10-CM

## 2018-11-21 DIAGNOSIS — N40.0 ENLARGED PROSTATE: ICD-10-CM

## 2018-11-21 DIAGNOSIS — N40.1 BENIGN PROSTATIC HYPERPLASIA WITH LOWER URINARY TRACT SYMPTOMS, SYMPTOM DETAILS UNSPECIFIED: ICD-10-CM

## 2018-11-21 LAB
ALBUMIN UR-MCNC: NEGATIVE MG/DL
APPEARANCE UR: CLEAR
BILIRUB UR QL STRIP: NEGATIVE
COLOR UR AUTO: YELLOW
GLUCOSE UR STRIP-MCNC: NEGATIVE MG/DL
HGB UR QL STRIP: NEGATIVE
KETONES UR STRIP-MCNC: NEGATIVE MG/DL
LEUKOCYTE ESTERASE UR QL STRIP: NEGATIVE
MUCOUS THREADS #/AREA URNS LPF: PRESENT /LPF
NITRATE UR QL: NEGATIVE
PH UR STRIP: 5 PH (ref 5–7)
RBC #/AREA URNS AUTO: 1 /HPF (ref 0–2)
SOURCE: ABNORMAL
SP GR UR STRIP: 1.02 (ref 1–1.03)
UROBILINOGEN UR STRIP-MCNC: 0 MG/DL (ref 0–2)
WBC #/AREA URNS AUTO: <1 /HPF (ref 0–5)

## 2018-11-21 RX ORDER — TAMSULOSIN HYDROCHLORIDE 0.4 MG/1
0.8 CAPSULE ORAL DAILY
Qty: 180 CAPSULE | Refills: 4 | Status: SHIPPED | OUTPATIENT
Start: 2018-11-21 | End: 2019-06-25

## 2018-11-21 RX ORDER — FINASTERIDE 5 MG/1
1 TABLET, FILM COATED ORAL DAILY
Qty: 90 TABLET | Refills: 4 | Status: SHIPPED | OUTPATIENT
Start: 2018-11-21 | End: 2019-06-25

## 2018-11-21 ASSESSMENT — PAIN SCALES - GENERAL: PAINLEVEL: NO PAIN (0)

## 2018-11-21 NOTE — PROGRESS NOTES
"HPI: Mr. Jimi Moreno is a 73 year old year old male presenting today for evaluation of chief complaint(s): RECHECK and Medication Follow-up    Mr. Moreno is a 72M is PMH significant for HTN and BPH/luts.  He was last seen on 17 by me.  At that time his urinary symptoms were stable on flomax and finasteride and his IPSS was 9 \"pleased.\"  He had previously tried stopping the flomax, but this had really caused his voiding symptoms to worse.     Today he has a slow stream but feels he is emptying reasonably well.  His IPSS is 7 \"mostly satisfied\" with the high score being a 5 for weak stream.  His PVR today is 109mL.   Notes that nitrites (in wine and preserved meats) tend to slow his stream.   He denies dysuria, hematuria, UTIs.   He has been completely healthy this year with no changes to PMH, medications.  He has had no surgeries and desires no surgeries.   His PSA in May was 1.77ng/dL on finasteride.  He has never had a biopsy. No FH prostate cancer.   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   He is .  He doesn't exercise.      REVIEW OF DIAGNOSTICS:  18 - Cr 1.10mg/dL  18 - PSA 1.77ng/dL  17 - PSA 2.37ng/dL   17 - UA negative  17: 2.72ng/mL (x2 would be 5.44)   12/23/15: PSA 1.81ng/mL  03/24/15 - started finasteride  01/13/15: 4.84ng/mL  01/13/15: UA negative  09: 3.39ng/mL    Current Outpatient Prescriptions   Medication Sig Dispense Refill     finasteride (PROSCAR) 5 MG tablet Take 1 tablet (5 mg) by mouth daily Call clinic to schedule MD APPT 30 tablet 0     lisinopril (PRINIVIL/ZESTRIL) 20 MG tablet Take 1 tablet (20 mg) by mouth daily 90 tablet 3     tamsulosin (FLOMAX) 0.4 MG capsule Take 1 capsule (0.4 mg) by mouth daily Call clinic to schedule MD APPT. 30 capsule 0       ALLERGIES: Pollen extract/tree extract      REVIEW OF SYSTEMS:  As above in " "HPI    GENERAL PHYSICAL EXAM:   Vitals: /88  Pulse 68  Ht 1.854 m (6' 1\")  Wt 107.8 kg (237 lb 9.6 oz)  BMI 31.35 kg/m2  Body mass index is 31.35 kg/(m^2).    GENERAL: Well groomed, well developed, well nourished male in NAD.  NEURO: Alert and oriented x 3.  PSYCH: Normal mood and affect, pleasant and agreeable during interview and exam.    SUKHDEEP:      normal rectal tone, small soft amount of stool in the rectal vault.      Large sized prostate, without  tenderness, nodules or asymmetry (unable to reach to top of base)    Residual Volume by Ultrasound today: 109 mL    LABS: The last test results for Mr. Jimi Moreno were reviewed:  PSA -   Lab Results   Component Value Date    PSA 1.77 05/22/2018    PSA 2.37 07/24/2017    PSA 2.72 01/23/2017    PSA 1.81 12/23/2015    PSA 4.84 01/13/2015    PSA 3.39 09/02/2009     BMP -   Recent Labs   Lab Test  05/22/18   0733  07/24/17   1004  03/30/17   0928   NA  143  140  142   POTASSIUM  4.1  5.0  4.4   CHLORIDE  108  108  109   CO2  27  27  24   BUN  16  18  20   CR  1.11  1.11  1.09   GLC  103*  92  108*   MELIDA  8.5  9.1  9.0       CBC -   Recent Labs   Lab Test  04/17/14   1450   WBC  6.2   HGB  16.7   PLT  180       ASSESSMENT:   1) BPH with luts and incomplete emptying  2) h/o working in the [employer] industry    PLAN:   - Will send urine today for urinalysis  - Will double tamsulosin to 0.8mg daily - refill x 1 year  - Continue finasteride 5mg daily - refill x 1 year  - Return 1 year (or sooner if you are having problems)  - Will give list of bladder irritants.  Discussed that some PO (such as preservatives / nitrites) can cause voiding symptoms.  EtOH can also do this.     Connie Nova PA-C  Department of Urologic Surgery    "

## 2018-11-21 NOTE — MR AVS SNAPSHOT
After Visit Summary   11/21/2018    Jimi Moreno    MRN: [id number]           Patient Information     Date Of Birth          1945        Visit Information        Provider Department      11/21/2018 8:30 AM Brianda Nova PA [facility]        Today's Diagnoses     BPH (benign prostatic hyperplasia)    -  1    Enlarged prostate        Benign prostatic hyperplasia with lower urinary tract symptoms, symptom details unspecified          Care Instructions    - Will send urine today  - Will double tamsulosin to 0.8mg daily  - Continue finasteride 5mg daily  - Return 1 year (or sooner if you are having problems)    Connie Nova PA-C    Below is a list of foods that can irritate the bladder and that I recommend you limit or avoid.      Caffeinated soft drinks    Coffee.    Tea.    Chocolate.    Acidic juices and fruits such as cranberry, lemon, orange, pineapple and other citrus as well as tart apple or cherry     Alcoholic beverages    Carbonated drinks, especially servando    Aspartame/Nutrasweet.    Spicy foods    Tomato products    Substitutions that you can make in your daily diet:  -Herbals teas that don't contain caffeine or a large amount of citrus  -Ovaltine instead of chocolate drinks  -Less acidic fruits such as blueberry, banana, sweet (rather than tart) apples, pears, papaya.                      Follow-ups after your visit        Follow-up notes from your care team     Return in about 1 year (around 11/21/2019).      Who to contact     Please call your clinic at [phone] to:    Ask questions about your health    Make or cancel appointments    Discuss your medicines    Learn about your test results    Speak to your doctor            Additional Information About Your Visit        MyChart Information     [employer] gives you secure access to your electronic health record. If you see a primary care provider, you can also send messages to your  "care team and make appointments. If you have questions, please call your primary care clinic.  If you do not have a primary care provider, please call [phone] and they will assist you.      [employer] is an electronic gateway that provides easy, online access to your medical records. With [employer], you can request a clinic appointment, read your test results, renew a prescription or communicate with your care team.     To access your existing account, please contact your [facility] or call [phone] for assistance.        Care EveryWhere ID     This is your Care EveryWhere ID. This could be used by other organizations to access your [facility] medical records  [id number]        Your Vitals Were     Pulse Height BMI (Body Mass Index)             68 1.854 m (6' 1\") 31.35 kg/m2          Blood Pressure from Last 3 Encounters:   11/21/18 149/88   05/01/18 120/74   11/02/17 (!) 131/104    Weight from Last 3 Encounters:   11/21/18 107.8 kg (237 lb 9.6 oz)   05/01/18 105.7 kg (233 lb)   08/08/17 108.8 kg (239 lb 12.8 oz)              We Performed the Following     Routine UA with micro reflex to culture          Today's Medication Changes          These changes are accurate as of 11/21/18  8:42 AM.  If you have any questions, ask your nurse or doctor.               These medicines have changed or have updated prescriptions.        Dose/Directions    finasteride 5 MG tablet   Commonly known as:  PROSCAR   This may have changed:  additional instructions   Used for:  Enlarged prostate   Changed by:  Brianda Nova PA        Dose:  1 tablet   Take 1 tablet (5 mg) by mouth daily   Quantity:  90 tablet   Refills:  4       tamsulosin 0.4 MG capsule   Commonly known as:  FLOMAX   This may have changed:    - how much to take  - additional instructions   Used for:  Benign prostatic hyperplasia with lower urinary tract symptoms, symptom details unspecified   Changed by:  Brianda Nova PA "        Dose:  0.8 mg   Take 2 capsules (0.8 mg) by mouth daily   Quantity:  180 capsule   Refills:  4            Where to get your medicines      These medications were sent to [facility] [address], [facility] [address]     Phone:  [phone]     finasteride 5 MG tablet    tamsulosin 0.4 MG capsule                Primary Care Provider Office Phone # Fax #    Taco Talbert [phone]       [address] [facility] [address]        Equal Access to Services     [facility]: Hadii aad ku hadasho Soomaali, waaxda luqadaha, qaybta kaalmada adeegyada, colton malinin hayaan adeandi segura . So [facility] [phone].    ATENCIÓN: Si habla español, tiene a west disposición servicios gratuitos de asistencia lingüística. Ll[facility] [phone].    We comply with applicable federal civil rights laws and [place] laws. We do not discriminate on the basis of race, color, national origin, age, disability, sex, sexual orientation, or gender identity.            Thank you!     Thank you for choosing [facility]  for your care. Our goal is always to provide you with excellent care. Hearing back from our patients is one way we can continue to improve our services. Please take a few minutes to complete the written survey that you may receive in the mail after your visit with us. Thank you!             Your Updated Medication List - Protect others around you: Learn how to safely use, store and throw away your medicines at www.disposemymeds.org.          This list is accurate as of 11/21/18  8:42 AM.  Always use your most recent med list.                   Brand Name Dispense Instructions for use Diagnosis    finasteride 5 MG tablet    PROSCAR    90 tablet    Take 1 tablet (5 mg) by mouth daily    Enlarged prostate       lisinopril 20 MG tablet    PRINIVIL/ZESTRIL    90 tablet    Take 1 tablet  (20 mg) by mouth daily    Essential hypertension with goal blood pressure less than 140/90       tamsulosin 0.4 MG capsule    FLOMAX    180 capsule    Take 2 capsules (0.8 mg) by mouth daily    Benign prostatic hyperplasia with lower urinary tract symptoms, symptom details unspecified

## 2018-11-21 NOTE — NURSING NOTE
Chief Complaint   Patient presents with     RECHECK     Medication Follow-up     Niranjan Chery CMA

## 2018-11-21 NOTE — PATIENT INSTRUCTIONS
- Will send urine today  - Will double tamsulosin to 0.8mg daily  - Continue finasteride 5mg daily  - Return 1 year (or sooner if you are having problems)    Connie Nova PA-C    Below is a list of foods that can irritate the bladder and that I recommend you limit or avoid.      Caffeinated soft drinks    Coffee.    Tea.    Chocolate.    Acidic juices and fruits such as cranberry, lemon, orange, pineapple and other citrus as well as tart apple or cherry     Alcoholic beverages    Carbonated drinks, especially servando    Aspartame/Nutrasweet.    Spicy foods    Tomato products    Substitutions that you can make in your daily diet:  -Herbals teas that don't contain caffeine or a large amount of citrus  -Ovaltine instead of chocolate drinks  -Less acidic fruits such as blueberry, banana, sweet (rather than tart) apples, pears, papaya.

## 2019-05-24 DIAGNOSIS — I10 ESSENTIAL HYPERTENSION WITH GOAL BLOOD PRESSURE LESS THAN 140/90: ICD-10-CM

## 2019-05-24 RX ORDER — LISINOPRIL 20 MG/1
TABLET ORAL
Qty: 30 TABLET | Refills: 0 | Status: SHIPPED | OUTPATIENT
Start: 2019-05-24 | End: 2019-06-25

## 2019-05-24 NOTE — TELEPHONE ENCOUNTER
"Requested Prescriptions   Pending Prescriptions Disp Refills     lisinopril (PRINIVIL/ZESTRIL) 20 MG tablet [Pharmacy Med Name: LISINOPRIL 20MG     TAB] 90 tablet 3     Sig: TAKE 1 TABLET BY MOUTH ONCE DAILY       ACE Inhibitors (Including Combos) Protocol Failed - 5/24/2019  3:44 PM        Failed - Blood pressure under 140/90 in past 12 months     BP Readings from Last 3 Encounters:   11/21/18 149/88   05/01/18 120/74   11/02/17 (!) 131/104                 Failed - Recent (12 mo) or future (30 days) visit within the authorizing provider's specialty     Patient had office visit in the last 12 months or has a visit in the next 30 days with authorizing provider or within the authorizing provider's specialty.  See \"Patient Info\" tab in inbasket, or \"Choose Columns\" in Meds & Orders section of the refill encounter.              Failed - Normal serum creatinine on file in past 12 months     Recent Labs   Lab Test 05/22/18  0733   CR 1.11             Failed - Normal serum potassium on file in past 12 months     Recent Labs   Lab Test 05/22/18  0733   POTASSIUM 4.1             Passed - Medication is active on med list        Passed - Patient is age 18 or older          "

## 2019-05-24 NOTE — TELEPHONE ENCOUNTER
Medication is being filled for 1 time refill only due to:  Patient needs to be seen because it has been more than one year since last visit.   Didi Patel RN

## 2019-06-25 ENCOUNTER — OFFICE VISIT (OUTPATIENT)
Dept: FAMILY MEDICINE | Facility: CLINIC | Age: 74
End: 2019-06-25
Payer: COMMERCIAL

## 2019-06-25 VITALS
HEART RATE: 67 BPM | BODY MASS INDEX: 30.61 KG/M2 | OXYGEN SATURATION: 95 % | TEMPERATURE: 97.4 F | DIASTOLIC BLOOD PRESSURE: 98 MMHG | WEIGHT: 232 LBS | SYSTOLIC BLOOD PRESSURE: 163 MMHG

## 2019-06-25 DIAGNOSIS — N40.0 ENLARGED PROSTATE: ICD-10-CM

## 2019-06-25 DIAGNOSIS — N40.1 BENIGN PROSTATIC HYPERPLASIA WITH LOWER URINARY TRACT SYMPTOMS, SYMPTOM DETAILS UNSPECIFIED: ICD-10-CM

## 2019-06-25 DIAGNOSIS — I10 ESSENTIAL HYPERTENSION WITH GOAL BLOOD PRESSURE LESS THAN 140/90: Primary | ICD-10-CM

## 2019-06-25 PROCEDURE — 99214 OFFICE O/P EST MOD 30 MIN: CPT | Performed by: FAMILY MEDICINE

## 2019-06-25 RX ORDER — LISINOPRIL 40 MG/1
40 TABLET ORAL DAILY
Qty: 90 TABLET | Refills: 3 | Status: SHIPPED | OUTPATIENT
Start: 2019-06-25 | End: 2020-06-25

## 2019-06-25 RX ORDER — FINASTERIDE 5 MG/1
1 TABLET, FILM COATED ORAL DAILY
Qty: 90 TABLET | Refills: 3 | Status: SHIPPED | OUTPATIENT
Start: 2019-06-25 | End: 2020-08-11

## 2019-06-25 RX ORDER — TAMSULOSIN HYDROCHLORIDE 0.4 MG/1
0.8 CAPSULE ORAL DAILY
Qty: 180 CAPSULE | Refills: 3 | Status: SHIPPED | OUTPATIENT
Start: 2019-06-25 | End: 2020-08-11

## 2019-06-25 ASSESSMENT — PAIN SCALES - GENERAL: PAINLEVEL: NO PAIN (0)

## 2019-06-25 NOTE — PROGRESS NOTES
Subjective     Jimi Moreno is a 74 year old male who presents to clinic today for the following health issues:    HPI   Hypertension Follow-up      Do you check your blood pressure regularly outside of the clinic? No     Are you following a low salt diet? Yes    Are your blood pressures ever more than 140 on the top number (systolic) OR more   than 90 on the bottom number (diastolic), for example 140/90? Yes    Amount of exercise or physical activity: None    Problems taking medications regularly: No    Medication side effects: none    Diet: regular (no restrictions)    Refill needed on Lisinopril.    Mana Francis MA      Reviewed and updated as needed this visit by Provider           Objective    BP (!) 163/98 (BP Location: Right arm, Patient Position: Chair, Cuff Size: Adult Regular)   Pulse 67   Temp 97.4  F (36.3  C) (Oral)   Wt 105.2 kg (232 lb)   SpO2 95%   BMI 30.61 kg/m    There is no height or weight on file to calculate BMI.       Physical Exam   GENERAL: healthy, alert and no distress  EYES: Eyes grossly normal to inspection, PERRL and conjunctivae and sclerae normal  HENT: ear canals and TM's normal, nose and mouth without ulcers or lesions  NECK: no adenopathy, no asymmetry, masses, or scars and thyroid normal to palpation  RESP: lungs clear to auscultation - no rales, rhonchi or wheezes  CV: regular rate and rhythm, normal S1 S2, no S3 or S4, no murmur, click or rub, no peripheral edema and peripheral pulses strong  ABDOMEN: soft, nontender, no hepatosplenomegaly, no masses and bowel sounds normal  MS: no gross musculoskeletal defects noted, no edema  SKIN: no suspicious lesions or rashes  NEURO: Normal strength and tone, mentation intact and speech normal  PSYCH: mentation appears normal, affect normal/bright        ICD-10-CM    1. Essential hypertension with goal blood pressure less than 140/90 I10 lisinopril (PRINIVIL/ZESTRIL) 40 MG tablet   2. Enlarged prostate N40.0 finasteride  (PROSCAR) 5 MG tablet   3. Benign prostatic hyperplasia with lower urinary tract symptoms, symptom details unspecified N40.1 tamsulosin (FLOMAX) 0.4 MG capsule     INCREASED the dose of lisinopril     Recheck in 1 month

## 2019-06-25 NOTE — PROGRESS NOTES
"Subjective     Jimi Moreno is a 74 year old male who presents to clinic today for the following health issues:    HPI   {SUPERLIST (Optional):814609}  {additonal problems for provider to add (Optional):657521}    {HIST REVIEW/ LINKS 2 (Optional):908469}    {Additional problems for the provider to add (optional):710973}  Reviewed and updated as needed this visit by Provider         Review of Systems   {ROS COMP (Optional):149908}      Objective    There were no vitals taken for this visit.  There is no height or weight on file to calculate BMI.  Physical Exam   {Exam List (Optional):373801}    {Diagnostic Test Results (Optional):787126::\"Diagnostic Test Results:\",\"Labs reviewed in Epic\"}        {PROVIDER CHARTING PREFERENCE:118436}      "

## 2019-07-23 ENCOUNTER — OFFICE VISIT (OUTPATIENT)
Dept: FAMILY MEDICINE | Facility: CLINIC | Age: 74
End: 2019-07-23
Payer: COMMERCIAL

## 2019-07-23 VITALS
HEART RATE: 74 BPM | TEMPERATURE: 97.7 F | HEIGHT: 73 IN | BODY MASS INDEX: 30.62 KG/M2 | SYSTOLIC BLOOD PRESSURE: 136 MMHG | WEIGHT: 231 LBS | DIASTOLIC BLOOD PRESSURE: 74 MMHG | OXYGEN SATURATION: 96 %

## 2019-07-23 DIAGNOSIS — I10 ESSENTIAL HYPERTENSION WITH GOAL BLOOD PRESSURE LESS THAN 140/90: Primary | ICD-10-CM

## 2019-07-23 PROCEDURE — 99213 OFFICE O/P EST LOW 20 MIN: CPT | Performed by: FAMILY MEDICINE

## 2019-07-23 RX ORDER — HYDROCHLOROTHIAZIDE 12.5 MG/1
25 TABLET ORAL DAILY
Qty: 60 TABLET | Refills: 0 | Status: SHIPPED | OUTPATIENT
Start: 2019-07-23 | End: 2019-09-17

## 2019-07-23 ASSESSMENT — MIFFLIN-ST. JEOR: SCORE: 1831.69

## 2019-07-23 NOTE — PROGRESS NOTES
"Subjective     Jimi Moreno is a 74 year old male who presents to clinic today for the following health issues:    HPI     Hypertension Follow-up      Do you check your blood pressure regularly outside of the clinic? Yes     Are you following a low salt diet? Yes    Are your blood pressures ever more than 140 on the top number (systolic) OR more   than 90 on the bottom number (diastolic), for example 140/90? Yes    Amount of exercise or physical activity: Daily Living    Problems taking medications regularly: No    Medication side effects: none    Diet: regular (no restrictions)    Current Outpatient Medications   Medication Sig Dispense Refill     finasteride (PROSCAR) 5 MG tablet Take 1 tablet (5 mg) by mouth daily 90 tablet 3     lisinopril (PRINIVIL/ZESTRIL) 40 MG tablet Take 1 tablet (40 mg) by mouth daily 90 tablet 3     tamsulosin (FLOMAX) 0.4 MG capsule Take 2 capsules (0.8 mg) by mouth daily 180 capsule 3     {additonal problems for provider to add does not feel that he has any new side effects with dose increase     Ros: no chest pain, chest tightness   No sob   No leg edema   No abdominal pain     Denies fever or chills   Weight stable             Objective    /74   Pulse 74   Temp 97.7  F (36.5  C) (Oral)   Ht 1.854 m (6' 1\")   Wt 104.8 kg (231 lb)   SpO2 96%   BMI 30.48 kg/m    Body mass index is 30.48 kg/m .     Wt Readings from Last 4 Encounters:   07/23/19 104.8 kg (231 lb)   06/25/19 105.2 kg (232 lb)   11/21/18 107.8 kg (237 lb 9.6 oz)   05/01/18 105.7 kg (233 lb)       Physical Exam       In NAD     Chest wall normal to inspection and palpation. Good excursion bilaterally. Lungs clear to auscultation. Good air movement bilaterally without rales, wheezes, or rhonchi.   Regular rate and  rhythm. S1 and S2 normal, no murmurs, clicks, gallops or rubs. No edema or JVD.      ICD-10-CM    1. Essential hypertension with goal blood pressure less than 140/90 I10              "

## 2019-09-17 ENCOUNTER — OFFICE VISIT (OUTPATIENT)
Dept: FAMILY MEDICINE | Facility: CLINIC | Age: 74
End: 2019-09-17
Payer: COMMERCIAL

## 2019-09-17 VITALS
HEART RATE: 69 BPM | DIASTOLIC BLOOD PRESSURE: 76 MMHG | WEIGHT: 233.5 LBS | SYSTOLIC BLOOD PRESSURE: 138 MMHG | BODY MASS INDEX: 30.81 KG/M2 | TEMPERATURE: 97.1 F

## 2019-09-17 DIAGNOSIS — I10 ESSENTIAL HYPERTENSION WITH GOAL BLOOD PRESSURE LESS THAN 140/90: ICD-10-CM

## 2019-09-17 LAB
ANION GAP SERPL CALCULATED.3IONS-SCNC: 7 MMOL/L (ref 3–14)
BUN SERPL-MCNC: 20 MG/DL (ref 7–30)
CALCIUM SERPL-MCNC: 8.6 MG/DL (ref 8.5–10.1)
CHLORIDE SERPL-SCNC: 112 MMOL/L (ref 94–109)
CO2 SERPL-SCNC: 23 MMOL/L (ref 20–32)
CREAT SERPL-MCNC: 1.14 MG/DL (ref 0.66–1.25)
GFR SERPL CREATININE-BSD FRML MDRD: 62 ML/MIN/{1.73_M2}
GLUCOSE SERPL-MCNC: 108 MG/DL (ref 70–99)
POTASSIUM SERPL-SCNC: 4.3 MMOL/L (ref 3.4–5.3)
SODIUM SERPL-SCNC: 142 MMOL/L (ref 133–144)

## 2019-09-17 PROCEDURE — 36415 COLL VENOUS BLD VENIPUNCTURE: CPT | Performed by: FAMILY MEDICINE

## 2019-09-17 PROCEDURE — 99213 OFFICE O/P EST LOW 20 MIN: CPT | Performed by: FAMILY MEDICINE

## 2019-09-17 PROCEDURE — 80048 BASIC METABOLIC PNL TOTAL CA: CPT | Performed by: FAMILY MEDICINE

## 2019-09-17 RX ORDER — HYDROCHLOROTHIAZIDE 12.5 MG/1
12.5 TABLET ORAL DAILY
Qty: 90 TABLET | Refills: 3 | Status: SHIPPED | OUTPATIENT
Start: 2019-09-17 | End: 2020-07-07

## 2019-09-17 NOTE — PROGRESS NOTES
Subjective     Jimi Moreno is a 74 year old male who presents to clinic today for the following health issues:    HPI     Blood pressure follow up    O: /76   Pulse 69   Temp 97.1  F (36.2  C) (Oral)   Wt 105.9 kg (233 lb 8 oz)   BMI 30.81 kg/m      Reviewed and updated as needed this visit by Provider         Review of Systems   ROS COMP: Constitutional, HEENT, cardiovascular, pulmonary, GI, , musculoskeletal, neuro, skin, endocrine and psych systems are negative, except as otherwise noted.    dizziness on double dose of diuretic   Dry cough   Patient has sinus drainage     Objective    /76   Pulse 69   Temp 97.1  F (36.2  C) (Oral)   Wt 105.9 kg (233 lb 8 oz)   BMI 30.81 kg/m    Body mass index is 30.81 kg/m .  Physical Exam   GENERAL: healthy, alert and no distress  EYES: Eyes grossly normal to inspection, PERRL and conjunctivae and sclerae normal  HENT: ear canals and TM's normal, nose and mouth without ulcers or lesions  NECK: no adenopathy, no asymmetry, masses, or scars and thyroid normal to palpation  RESP: lungs clear to auscultation - no rales, rhonchi or wheezes  CV: regular rate and rhythm, normal S1 S2, no S3 or S4, no murmur, click or rub, no peripheral edema and peripheral pulses strong    MS: no gross musculoskeletal defects noted, no edema  SKIN: no suspicious lesions or rashes  NEURO: Normal strength and tone, mentation intact and speech normal  PSYCH: mentation appears normal, affect normal/bright        ICD-10-CM    1. Essential hypertension with goal blood pressure less than 140/90 I10 hydrochlorothiazide (HYDRODIURIL) 12.5 MG tablet     Basic metabolic panel       Will reduce the dose down to 12.5 mg tabs

## 2019-09-18 NOTE — RESULT ENCOUNTER NOTE
Your basic metabolic panel which includes electrolytes,kidney function is normal and  -Glucose (diabetic screening test) is elevated slightly       Follow up in 6 month(s)

## 2020-06-24 DIAGNOSIS — I10 ESSENTIAL HYPERTENSION WITH GOAL BLOOD PRESSURE LESS THAN 140/90: ICD-10-CM

## 2020-06-25 RX ORDER — LISINOPRIL 40 MG/1
TABLET ORAL
Qty: 90 TABLET | Refills: 0 | Status: ON HOLD | OUTPATIENT
Start: 2020-06-25 | End: 2020-07-10

## 2020-07-07 ENCOUNTER — APPOINTMENT (OUTPATIENT)
Dept: GENERAL RADIOLOGY | Facility: CLINIC | Age: 75
DRG: 194 | End: 2020-07-07
Attending: FAMILY MEDICINE
Payer: COMMERCIAL

## 2020-07-07 ENCOUNTER — HOSPITAL ENCOUNTER (INPATIENT)
Facility: CLINIC | Age: 75
LOS: 4 days | Discharge: HOME | DRG: 194 | End: 2020-07-11
Attending: FAMILY MEDICINE | Admitting: INTERNAL MEDICINE
Payer: COMMERCIAL

## 2020-07-07 ENCOUNTER — APPOINTMENT (OUTPATIENT)
Dept: ULTRASOUND IMAGING | Facility: CLINIC | Age: 75
DRG: 194 | End: 2020-07-07
Attending: INTERNAL MEDICINE
Payer: COMMERCIAL

## 2020-07-07 DIAGNOSIS — I10 ESSENTIAL HYPERTENSION WITH GOAL BLOOD PRESSURE LESS THAN 140/90: ICD-10-CM

## 2020-07-07 DIAGNOSIS — Z20.822 SUSPECTED COVID-19 VIRUS INFECTION: ICD-10-CM

## 2020-07-07 DIAGNOSIS — I24.89 DEMAND ISCHEMIA (H): ICD-10-CM

## 2020-07-07 DIAGNOSIS — I48.91 ATRIAL FIBRILLATION WITH RVR (H): ICD-10-CM

## 2020-07-07 DIAGNOSIS — I50.9 ACUTE CONGESTIVE HEART FAILURE, UNSPECIFIED HEART FAILURE TYPE (H): ICD-10-CM

## 2020-07-07 DIAGNOSIS — J18.9 PNEUMONIA OF RIGHT LOWER LOBE DUE TO INFECTIOUS ORGANISM: Primary | ICD-10-CM

## 2020-07-07 LAB
ALBUMIN SERPL-MCNC: 3 G/DL (ref 3.4–5)
ALBUMIN UR-MCNC: >499 MG/DL
ALP SERPL-CCNC: 74 U/L (ref 40–150)
ALT SERPL W P-5'-P-CCNC: 43 U/L (ref 0–70)
ANION GAP SERPL CALCULATED.3IONS-SCNC: 10 MMOL/L (ref 3–14)
ANION GAP SERPL CALCULATED.3IONS-SCNC: 6 MMOL/L (ref 3–14)
APPEARANCE UR: ABNORMAL
AST SERPL W P-5'-P-CCNC: 44 U/L (ref 0–45)
BASOPHILS # BLD AUTO: 0 10E9/L (ref 0–0.2)
BASOPHILS NFR BLD AUTO: 0.2 %
BILIRUB SERPL-MCNC: 1.3 MG/DL (ref 0.2–1.3)
BILIRUB UR QL STRIP: NEGATIVE
BUN SERPL-MCNC: 24 MG/DL (ref 7–30)
BUN SERPL-MCNC: 25 MG/DL (ref 7–30)
CALCIUM SERPL-MCNC: 8 MG/DL (ref 8.5–10.1)
CALCIUM SERPL-MCNC: 8.5 MG/DL (ref 8.5–10.1)
CHLORIDE SERPL-SCNC: 101 MMOL/L (ref 94–109)
CHLORIDE SERPL-SCNC: 107 MMOL/L (ref 94–109)
CO2 SERPL-SCNC: 21 MMOL/L (ref 20–32)
CO2 SERPL-SCNC: 23 MMOL/L (ref 20–32)
COLOR UR AUTO: ABNORMAL
CREAT SERPL-MCNC: 1.37 MG/DL (ref 0.66–1.25)
CREAT SERPL-MCNC: 1.54 MG/DL (ref 0.66–1.25)
CREAT UR-MCNC: 449 MG/DL
CRP SERPL-MCNC: 211 MG/L (ref 0–8)
D DIMER PPP FEU-MCNC: 2 UG/ML FEU (ref 0–0.5)
DIFFERENTIAL METHOD BLD: ABNORMAL
EOSINOPHIL NFR BLD AUTO: 0 %
ERYTHROCYTE [DISTWIDTH] IN BLOOD BY AUTOMATED COUNT: 12.2 % (ref 10–15)
ERYTHROCYTE [DISTWIDTH] IN BLOOD BY AUTOMATED COUNT: 12.5 % (ref 10–15)
FRACT EXCRET NA UR+SERPL-RTO: <0.1 %
GFR SERPL CREATININE-BSD FRML MDRD: 43 ML/MIN/{1.73_M2}
GFR SERPL CREATININE-BSD FRML MDRD: 49 ML/MIN/{1.73_M2}
GLUCOSE BLDC GLUCOMTR-MCNC: 103 MG/DL (ref 70–99)
GLUCOSE SERPL-MCNC: 142 MG/DL (ref 70–99)
GLUCOSE SERPL-MCNC: 180 MG/DL (ref 70–99)
GLUCOSE UR STRIP-MCNC: NEGATIVE MG/DL
HCT VFR BLD AUTO: 44 % (ref 40–53)
HCT VFR BLD AUTO: 46.2 % (ref 40–53)
HGB BLD-MCNC: 15.2 G/DL (ref 13.3–17.7)
HGB BLD-MCNC: 16 G/DL (ref 13.3–17.7)
HGB UR QL STRIP: ABNORMAL
HYALINE CASTS #/AREA URNS LPF: 49 /LPF (ref 0–2)
IMM GRANULOCYTES # BLD: 0.1 10E9/L (ref 0–0.4)
IMM GRANULOCYTES NFR BLD: 0.7 %
KETONES UR STRIP-MCNC: 5 MG/DL
LACTATE BLD-SCNC: 1.3 MMOL/L (ref 0.7–2)
LACTATE BLD-SCNC: 1.5 MMOL/L (ref 0.7–2)
LDH SERPL L TO P-CCNC: 185 U/L (ref 85–227)
LEUKOCYTE ESTERASE UR QL STRIP: NEGATIVE
LMWH PPP CHRO-ACNC: <0.1 IU/ML
LYMPHOCYTES # BLD AUTO: 0.4 10E9/L (ref 0.8–5.3)
LYMPHOCYTES NFR BLD AUTO: 3.4 %
MAGNESIUM SERPL-MCNC: 1.8 MG/DL (ref 1.6–2.3)
MAGNESIUM SERPL-MCNC: 1.9 MG/DL (ref 1.6–2.3)
MAGNESIUM SERPL-MCNC: 1.9 MG/DL (ref 1.6–2.3)
MCH RBC QN AUTO: 31.1 PG (ref 26.5–33)
MCH RBC QN AUTO: 31.5 PG (ref 26.5–33)
MCHC RBC AUTO-ENTMCNC: 34.5 G/DL (ref 31.5–36.5)
MCHC RBC AUTO-ENTMCNC: 34.6 G/DL (ref 31.5–36.5)
MCV RBC AUTO: 90 FL (ref 78–100)
MCV RBC AUTO: 91 FL (ref 78–100)
MONOCYTES # BLD AUTO: 1.1 10E9/L (ref 0–1.3)
MONOCYTES NFR BLD AUTO: 9 %
MUCOUS THREADS #/AREA URNS LPF: PRESENT /LPF
NEUTROPHILS # BLD AUTO: 10.2 10E9/L (ref 1.6–8.3)
NEUTROPHILS NFR BLD AUTO: 86.7 %
NITRATE UR QL: NEGATIVE
NRBC # BLD AUTO: 0 10*3/UL
NRBC BLD AUTO-RTO: 0 /100
NT-PROBNP SERPL-MCNC: 1892 PG/ML (ref 0–1800)
PH UR STRIP: 5 PH (ref 5–7)
PHOSPHATE SERPL-MCNC: 2.4 MG/DL (ref 2.5–4.5)
PLATELET # BLD AUTO: 162 10E9/L (ref 150–450)
PLATELET # BLD AUTO: 168 10E9/L (ref 150–450)
POTASSIUM SERPL-SCNC: 3.8 MMOL/L (ref 3.4–5.3)
POTASSIUM SERPL-SCNC: 3.8 MMOL/L (ref 3.4–5.3)
PROCALCITONIN SERPL-MCNC: 4.19 NG/ML
PROT SERPL-MCNC: 7.3 G/DL (ref 6.8–8.8)
RBC # BLD AUTO: 4.83 10E12/L (ref 4.4–5.9)
RBC # BLD AUTO: 5.15 10E12/L (ref 4.4–5.9)
RBC #/AREA URNS AUTO: 2 /HPF (ref 0–2)
SARS-COV-2 PCR COMMENT: NORMAL
SARS-COV-2 RNA SPEC QL NAA+PROBE: NEGATIVE
SARS-COV-2 RNA SPEC QL NAA+PROBE: NORMAL
SODIUM SERPL-SCNC: 132 MMOL/L (ref 133–144)
SODIUM SERPL-SCNC: 136 MMOL/L (ref 133–144)
SODIUM UR-SCNC: 30 MMOL/L
SOURCE: ABNORMAL
SP GR UR STRIP: 1.03 (ref 1–1.03)
SPECIMEN SOURCE: NORMAL
SPECIMEN SOURCE: NORMAL
TROPONIN I SERPL-MCNC: 0.05 UG/L (ref 0–0.04)
TROPONIN I SERPL-MCNC: 0.1 UG/L (ref 0–0.04)
TROPONIN I SERPL-MCNC: 0.11 UG/L (ref 0–0.04)
UROBILINOGEN UR STRIP-MCNC: 0 MG/DL (ref 0–2)
WBC # BLD AUTO: 10.2 10E9/L (ref 4–11)
WBC # BLD AUTO: 11.8 10E9/L (ref 4–11)
WBC #/AREA URNS AUTO: ABNORMAL /HPF (ref 0–5)

## 2020-07-07 PROCEDURE — 84484 ASSAY OF TROPONIN QUANT: CPT | Performed by: FAMILY MEDICINE

## 2020-07-07 PROCEDURE — C9803 HOPD COVID-19 SPEC COLLECT: HCPCS | Performed by: FAMILY MEDICINE

## 2020-07-07 PROCEDURE — 99207 ZZC DOWN CODE DUE TO INITIAL EXAM: CPT | Performed by: INTERNAL MEDICINE

## 2020-07-07 PROCEDURE — 25000132 ZZH RX MED GY IP 250 OP 250 PS 637: Performed by: INTERNAL MEDICINE

## 2020-07-07 PROCEDURE — 99291 CRITICAL CARE FIRST HOUR: CPT | Mod: 25 | Performed by: FAMILY MEDICINE

## 2020-07-07 PROCEDURE — 36415 COLL VENOUS BLD VENIPUNCTURE: CPT | Performed by: INTERNAL MEDICINE

## 2020-07-07 PROCEDURE — 81001 URINALYSIS AUTO W/SCOPE: CPT | Performed by: FAMILY MEDICINE

## 2020-07-07 PROCEDURE — 25000125 ZZHC RX 250: Performed by: FAMILY MEDICINE

## 2020-07-07 PROCEDURE — 25800030 ZZH RX IP 258 OP 636: Performed by: INTERNAL MEDICINE

## 2020-07-07 PROCEDURE — 96366 THER/PROPH/DIAG IV INF ADDON: CPT | Performed by: FAMILY MEDICINE

## 2020-07-07 PROCEDURE — 93010 ELECTROCARDIOGRAM REPORT: CPT | Mod: Z6 | Performed by: FAMILY MEDICINE

## 2020-07-07 PROCEDURE — 25000128 H RX IP 250 OP 636: Performed by: INTERNAL MEDICINE

## 2020-07-07 PROCEDURE — 76770 US EXAM ABDO BACK WALL COMP: CPT

## 2020-07-07 PROCEDURE — 96375 TX/PRO/DX INJ NEW DRUG ADDON: CPT | Mod: 59 | Performed by: FAMILY MEDICINE

## 2020-07-07 PROCEDURE — 00000146 ZZHCL STATISTIC GLUCOSE BY METER IP

## 2020-07-07 PROCEDURE — 83880 ASSAY OF NATRIURETIC PEPTIDE: CPT | Performed by: FAMILY MEDICINE

## 2020-07-07 PROCEDURE — 25000125 ZZHC RX 250: Performed by: INTERNAL MEDICINE

## 2020-07-07 PROCEDURE — 25800030 ZZH RX IP 258 OP 636: Performed by: FAMILY MEDICINE

## 2020-07-07 PROCEDURE — 93005 ELECTROCARDIOGRAM TRACING: CPT | Performed by: FAMILY MEDICINE

## 2020-07-07 PROCEDURE — 83605 ASSAY OF LACTIC ACID: CPT | Performed by: INTERNAL MEDICINE

## 2020-07-07 PROCEDURE — 87040 BLOOD CULTURE FOR BACTERIA: CPT | Performed by: FAMILY MEDICINE

## 2020-07-07 PROCEDURE — 85520 HEPARIN ASSAY: CPT | Performed by: INTERNAL MEDICINE

## 2020-07-07 PROCEDURE — 85027 COMPLETE CBC AUTOMATED: CPT | Performed by: INTERNAL MEDICINE

## 2020-07-07 PROCEDURE — 84145 PROCALCITONIN (PCT): CPT | Performed by: INTERNAL MEDICINE

## 2020-07-07 PROCEDURE — 80048 BASIC METABOLIC PNL TOTAL CA: CPT | Performed by: INTERNAL MEDICINE

## 2020-07-07 PROCEDURE — 84484 ASSAY OF TROPONIN QUANT: CPT | Performed by: INTERNAL MEDICINE

## 2020-07-07 PROCEDURE — 83735 ASSAY OF MAGNESIUM: CPT | Performed by: FAMILY MEDICINE

## 2020-07-07 PROCEDURE — 99285 EMERGENCY DEPT VISIT HI MDM: CPT | Mod: 25 | Performed by: FAMILY MEDICINE

## 2020-07-07 PROCEDURE — 83735 ASSAY OF MAGNESIUM: CPT | Performed by: INTERNAL MEDICINE

## 2020-07-07 PROCEDURE — 83605 ASSAY OF LACTIC ACID: CPT | Performed by: FAMILY MEDICINE

## 2020-07-07 PROCEDURE — 86140 C-REACTIVE PROTEIN: CPT | Performed by: INTERNAL MEDICINE

## 2020-07-07 PROCEDURE — 80053 COMPREHEN METABOLIC PANEL: CPT | Performed by: FAMILY MEDICINE

## 2020-07-07 PROCEDURE — 20000003 ZZH R&B ICU

## 2020-07-07 PROCEDURE — 96361 HYDRATE IV INFUSION ADD-ON: CPT | Performed by: FAMILY MEDICINE

## 2020-07-07 PROCEDURE — 84300 ASSAY OF URINE SODIUM: CPT | Performed by: INTERNAL MEDICINE

## 2020-07-07 PROCEDURE — 85025 COMPLETE CBC W/AUTO DIFF WBC: CPT | Performed by: FAMILY MEDICINE

## 2020-07-07 PROCEDURE — 83615 LACTATE (LD) (LDH) ENZYME: CPT | Performed by: INTERNAL MEDICINE

## 2020-07-07 PROCEDURE — 84100 ASSAY OF PHOSPHORUS: CPT | Performed by: FAMILY MEDICINE

## 2020-07-07 PROCEDURE — 96365 THER/PROPH/DIAG IV INF INIT: CPT | Performed by: FAMILY MEDICINE

## 2020-07-07 PROCEDURE — 82570 ASSAY OF URINE CREATININE: CPT | Performed by: INTERNAL MEDICINE

## 2020-07-07 PROCEDURE — 85379 FIBRIN DEGRADATION QUANT: CPT | Performed by: INTERNAL MEDICINE

## 2020-07-07 PROCEDURE — 99221 1ST HOSP IP/OBS SF/LOW 40: CPT | Mod: AI | Performed by: INTERNAL MEDICINE

## 2020-07-07 PROCEDURE — 71045 X-RAY EXAM CHEST 1 VIEW: CPT | Mod: TC

## 2020-07-07 PROCEDURE — 87040 BLOOD CULTURE FOR BACTERIA: CPT | Performed by: INTERNAL MEDICINE

## 2020-07-07 RX ORDER — TAMSULOSIN HYDROCHLORIDE 0.4 MG/1
0.8 CAPSULE ORAL DAILY
Status: DISCONTINUED | OUTPATIENT
Start: 2020-07-07 | End: 2020-07-11 | Stop reason: HOSPADM

## 2020-07-07 RX ORDER — POTASSIUM CHLORIDE 1500 MG/1
20-40 TABLET, EXTENDED RELEASE ORAL
Status: DISCONTINUED | OUTPATIENT
Start: 2020-07-07 | End: 2020-07-11 | Stop reason: HOSPADM

## 2020-07-07 RX ORDER — ACETAMINOPHEN 325 MG/1
650 TABLET ORAL EVERY 4 HOURS PRN
Status: DISCONTINUED | OUTPATIENT
Start: 2020-07-07 | End: 2020-07-11 | Stop reason: HOSPADM

## 2020-07-07 RX ORDER — DILTIAZEM HYDROCHLORIDE 5 MG/ML
25 INJECTION INTRAVENOUS ONCE
Status: COMPLETED | OUTPATIENT
Start: 2020-07-07 | End: 2020-07-07

## 2020-07-07 RX ORDER — POTASSIUM CL/LIDO/0.9 % NACL 10MEQ/0.1L
10 INTRAVENOUS SOLUTION, PIGGYBACK (ML) INTRAVENOUS
Status: DISCONTINUED | OUTPATIENT
Start: 2020-07-07 | End: 2020-07-11 | Stop reason: HOSPADM

## 2020-07-07 RX ORDER — ACETAMINOPHEN 650 MG/1
650 SUPPOSITORY RECTAL EVERY 4 HOURS PRN
Status: DISCONTINUED | OUTPATIENT
Start: 2020-07-07 | End: 2020-07-09

## 2020-07-07 RX ORDER — SODIUM CHLORIDE 9 MG/ML
INJECTION, SOLUTION INTRAVENOUS CONTINUOUS
Status: DISCONTINUED | OUTPATIENT
Start: 2020-07-07 | End: 2020-07-09

## 2020-07-07 RX ORDER — FINASTERIDE 5 MG/1
5 TABLET, FILM COATED ORAL DAILY
Status: DISCONTINUED | OUTPATIENT
Start: 2020-07-07 | End: 2020-07-11 | Stop reason: HOSPADM

## 2020-07-07 RX ORDER — NALOXONE HYDROCHLORIDE 0.4 MG/ML
.1-.4 INJECTION, SOLUTION INTRAMUSCULAR; INTRAVENOUS; SUBCUTANEOUS
Status: DISCONTINUED | OUTPATIENT
Start: 2020-07-07 | End: 2020-07-11 | Stop reason: HOSPADM

## 2020-07-07 RX ORDER — HEPARIN SODIUM 10000 [USP'U]/100ML
0-3500 INJECTION, SOLUTION INTRAVENOUS CONTINUOUS
Status: DISCONTINUED | OUTPATIENT
Start: 2020-07-07 | End: 2020-07-09

## 2020-07-07 RX ORDER — ONDANSETRON 2 MG/ML
4 INJECTION INTRAMUSCULAR; INTRAVENOUS EVERY 6 HOURS PRN
Status: DISCONTINUED | OUTPATIENT
Start: 2020-07-07 | End: 2020-07-11 | Stop reason: HOSPADM

## 2020-07-07 RX ORDER — ONDANSETRON 4 MG/1
4 TABLET, ORALLY DISINTEGRATING ORAL EVERY 6 HOURS PRN
Status: DISCONTINUED | OUTPATIENT
Start: 2020-07-07 | End: 2020-07-11 | Stop reason: HOSPADM

## 2020-07-07 RX ORDER — AMOXICILLIN 250 MG
2 CAPSULE ORAL 2 TIMES DAILY PRN
Status: DISCONTINUED | OUTPATIENT
Start: 2020-07-07 | End: 2020-07-11 | Stop reason: HOSPADM

## 2020-07-07 RX ORDER — POTASSIUM CHLORIDE 29.8 MG/ML
20 INJECTION INTRAVENOUS
Status: DISCONTINUED | OUTPATIENT
Start: 2020-07-07 | End: 2020-07-11 | Stop reason: HOSPADM

## 2020-07-07 RX ORDER — AMOXICILLIN 250 MG
1 CAPSULE ORAL 2 TIMES DAILY PRN
Status: DISCONTINUED | OUTPATIENT
Start: 2020-07-07 | End: 2020-07-11 | Stop reason: HOSPADM

## 2020-07-07 RX ORDER — MAGNESIUM SULFATE HEPTAHYDRATE 40 MG/ML
4 INJECTION, SOLUTION INTRAVENOUS EVERY 4 HOURS PRN
Status: DISCONTINUED | OUTPATIENT
Start: 2020-07-07 | End: 2020-07-11 | Stop reason: HOSPADM

## 2020-07-07 RX ORDER — POTASSIUM CHLORIDE 7.45 MG/ML
10 INJECTION INTRAVENOUS
Status: DISCONTINUED | OUTPATIENT
Start: 2020-07-07 | End: 2020-07-11 | Stop reason: HOSPADM

## 2020-07-07 RX ORDER — PROCHLORPERAZINE 25 MG
12.5 SUPPOSITORY, RECTAL RECTAL EVERY 12 HOURS PRN
Status: DISCONTINUED | OUTPATIENT
Start: 2020-07-07 | End: 2020-07-11 | Stop reason: HOSPADM

## 2020-07-07 RX ORDER — MAGNESIUM SULFATE HEPTAHYDRATE 40 MG/ML
2 INJECTION, SOLUTION INTRAVENOUS DAILY PRN
Status: DISCONTINUED | OUTPATIENT
Start: 2020-07-07 | End: 2020-07-11 | Stop reason: HOSPADM

## 2020-07-07 RX ORDER — PROCHLORPERAZINE MALEATE 5 MG
5 TABLET ORAL EVERY 6 HOURS PRN
Status: DISCONTINUED | OUTPATIENT
Start: 2020-07-07 | End: 2020-07-11 | Stop reason: HOSPADM

## 2020-07-07 RX ORDER — POTASSIUM CHLORIDE 1.5 G/1.58G
20-40 POWDER, FOR SOLUTION ORAL
Status: DISCONTINUED | OUTPATIENT
Start: 2020-07-07 | End: 2020-07-11 | Stop reason: HOSPADM

## 2020-07-07 RX ADMIN — DILTIAZEM HYDROCHLORIDE 25 MG: 5 INJECTION, SOLUTION INTRAVENOUS at 12:35

## 2020-07-07 RX ADMIN — TAMSULOSIN HYDROCHLORIDE 0.8 MG: 0.4 CAPSULE ORAL at 21:48

## 2020-07-07 RX ADMIN — ACETAMINOPHEN 650 MG: 325 TABLET, FILM COATED ORAL at 18:00

## 2020-07-07 RX ADMIN — SODIUM CHLORIDE: 9 INJECTION, SOLUTION INTRAVENOUS at 18:58

## 2020-07-07 RX ADMIN — DILTIAZEM HYDROCHLORIDE 15 MG/HR: 5 INJECTION INTRAVENOUS at 23:45

## 2020-07-07 RX ADMIN — DILTIAZEM HYDROCHLORIDE 5 MG/HR: 5 INJECTION INTRAVENOUS at 13:36

## 2020-07-07 RX ADMIN — ACETAMINOPHEN 650 MG: 325 TABLET, FILM COATED ORAL at 23:53

## 2020-07-07 RX ADMIN — POTASSIUM CHLORIDE 20 MEQ: 1500 TABLET, EXTENDED RELEASE ORAL at 18:00

## 2020-07-07 RX ADMIN — SODIUM CHLORIDE 1000 ML: 9 INJECTION, SOLUTION INTRAVENOUS at 18:53

## 2020-07-07 RX ADMIN — SODIUM CHLORIDE: 9 INJECTION, SOLUTION INTRAVENOUS at 12:33

## 2020-07-07 RX ADMIN — FINASTERIDE 5 MG: 5 TABLET, FILM COATED ORAL at 21:48

## 2020-07-07 RX ADMIN — MAGNESIUM SULFATE IN WATER 2 G: 40 INJECTION, SOLUTION INTRAVENOUS at 20:34

## 2020-07-07 ASSESSMENT — MIFFLIN-ST. JEOR: SCORE: 1816.48

## 2020-07-07 ASSESSMENT — ACTIVITIES OF DAILY LIVING (ADL): ADLS_ACUITY_SCORE: 13

## 2020-07-07 NOTE — ED TRIAGE NOTES
Has been exposed to people who have tested positive for COVID. Has been having some shortness of breath and cough for 4 days.

## 2020-07-07 NOTE — ED PROVIDER NOTES
History     Chief Complaint   Patient presents with     Shortness of Breath     Cough     HPI  Jimi Moreno is a 75 year old male who presents with a 4 to 5-day history of increasing shortness of breath, diarrhea and not feeling well.  Patient recently was exposed to his grandson that tested positive for COVID-19.  Patient states over the last 4 days he has had worsening shortness of breath, it is worse with activity.  He has been having 5-10 loose, yellow watery stools.  Denies any nausea or any vomiting.  Denies any body aches.  Denies any neck pain or stiffness.  Patient denies any dysuria or hematuria.  Patient does complain of some tightness in his chest that is pretty much been constant.  Denies feeling lightheaded or dizzy.  He has been eating and drinking okay.  Patient denies any headaches or visual changes.  Denies a sore throat but has had a little bit of a runny nose.    Allergies:  Allergies   Allergen Reactions     Pollen Extract/Tree Extract Other (See Comments)     Oak pollen         Problem List:    Patient Active Problem List    Diagnosis Date Noted     Essential hypertension with goal blood pressure less than 140/90 02/07/2017     Priority: Medium     Rash 05/19/2014     Priority: Medium     CARDIOVASCULAR SCREENING; LDL GOAL LESS THAN 160 10/31/2010     Priority: Medium        Past Medical History:    Past Medical History:   Diagnosis Date     Sinusitis acute      Unspecified essential hypertension      Varicose vein        Past Surgical History:    Past Surgical History:   Procedure Laterality Date     COLONOSCOPY N/A 11/2/2017    Procedure: COMBINED COLONOSCOPY, SINGLE OR MULTIPLE BIOPSY/POLYPECTOMY BY BIOPSY;;  Surgeon: Sanjay Chang MD;  Location: [facility] OR     COLONOSCOPY WITH CO2 INSUFFLATION N/A 11/2/2017    Procedure: COLONOSCOPY WITH CO2 INSUFFLATION;  COLON SCREEN/ ZOWNIROWYCZ;  Surgeon: Sanjay Chang MD;  Location: [facility] OR     NO HISTORY OF SURGERY         Family  History:    Family History   Problem Relation Age of Onset     Cancer No family hx of         no skin cancer       Social History:  Marital Status:   [2]  Social History     Tobacco Use     Smoking status: Never Smoker     Smokeless tobacco: Never Used   Substance Use Topics     Alcohol use: Yes     Drug use: No        Medications:    finasteride (PROSCAR) 5 MG tablet  hydrochlorothiazide (HYDRODIURIL) 12.5 MG tablet  lisinopril (ZESTRIL) 40 MG tablet  tamsulosin (FLOMAX) 0.4 MG capsule          Review of Systems   All other systems reviewed and are negative.      Physical Exam   BP: (!) 124/96  Heart Rate: 74  Temp: 99.3  F (37.4  C)  Resp: 20  Weight: 104 kg (229 lb 4.8 oz)  SpO2: 94 %      Physical Exam  Vitals signs and nursing note reviewed.   Constitutional:       General: He is not in acute distress.     Appearance: He is well-developed. He is not diaphoretic.   HENT:      Head: Normocephalic and atraumatic.      Nose: Nose normal.      Mouth/Throat:      Pharynx: No oropharyngeal exudate.   Eyes:      General: No scleral icterus.     Conjunctiva/sclera: Conjunctivae normal.      Pupils: Pupils are equal, round, and reactive to light.   Neck:      Musculoskeletal: Normal range of motion.   Cardiovascular:      Rate and Rhythm: Normal rate and regular rhythm.      Heart sounds: Normal heart sounds. No murmur. No friction rub.   Pulmonary:      Effort: Pulmonary effort is normal. No respiratory distress.      Breath sounds: Normal breath sounds. No wheezing or rales.   Abdominal:      General: Bowel sounds are normal. There is no distension.      Palpations: Abdomen is soft. There is no mass.      Tenderness: There is no abdominal tenderness. There is no guarding or rebound.   Musculoskeletal: Normal range of motion.         General: No tenderness.   Skin:     General: Skin is warm.      Findings: No rash.   Neurological:      Mental Status: He is alert and oriented to person, place, and time.    Psychiatric:         Judgment: Judgment normal.         ED Course        Procedures               EKG Interpretation:      Interpreted by Alex Benavides MD  Time reviewed: arrival  Symptoms at time of EKG: None   Rhythm: atrial fibrillation - rapid  Rate: Tachycardia  Axis: Normal  Ectopy: none  Conduction: normal  ST Segments/ T Waves: No acute ischemic changes and Non-specific ST-T wave changes  Q Waves: none  Comparison to prior: No old EKG available    Clinical Impression: atrial fibrillation (new onset)                Critical Care time:  was 30 minutes for this patient excluding procedures.    Results for orders placed or performed during the hospital encounter of 07/07/20 (from the past 24 hour(s))   CBC with platelets differential   Result Value Ref Range    WBC 11.8 (H) 4.0 - 11.0 10e9/L    RBC Count 5.15 4.4 - 5.9 10e12/L    Hemoglobin 16.0 13.3 - 17.7 g/dL    Hematocrit 46.2 40.0 - 53.0 %    MCV 90 78 - 100 fl    MCH 31.1 26.5 - 33.0 pg    MCHC 34.6 31.5 - 36.5 g/dL    RDW 12.2 10.0 - 15.0 %    Platelet Count 168 150 - 450 10e9/L    Diff Method Automated Method     % Neutrophils 86.7 %    % Lymphocytes 3.4 %    % Monocytes 9.0 %    % Eosinophils 0.0 %    % Basophils 0.2 %    % Immature Granulocytes 0.7 %    Nucleated RBCs 0 0 /100    Absolute Neutrophil 10.2 (H) 1.6 - 8.3 10e9/L    Absolute Lymphocytes 0.4 (L) 0.8 - 5.3 10e9/L    Absolute Monocytes 1.1 0.0 - 1.3 10e9/L    Absolute Basophils 0.0 0.0 - 0.2 10e9/L    Abs Immature Granulocytes 0.1 0 - 0.4 10e9/L    Absolute Nucleated RBC 0.0    Lactic acid whole blood   Result Value Ref Range    Lactic Acid 1.3 0.7 - 2.0 mmol/L   Troponin I   Result Value Ref Range    Troponin I ES 0.051 (H) 0.000 - 0.045 ug/L   Comprehensive metabolic panel   Result Value Ref Range    Sodium 132 (L) 133 - 144 mmol/L    Potassium 3.8 3.4 - 5.3 mmol/L    Chloride 101 94 - 109 mmol/L    Carbon Dioxide 21 20 - 32 mmol/L    Anion Gap 10 3 - 14 mmol/L    Glucose 142 (H)  70 - 99 mg/dL    Urea Nitrogen 24 7 - 30 mg/dL    Creatinine 1.54 (H) 0.66 - 1.25 mg/dL    GFR Estimate 43 (L) >60 mL/min/[1.73_m2]    GFR Estimate If Black 50 (L) >60 mL/min/[1.73_m2]    Calcium 8.5 8.5 - 10.1 mg/dL    Bilirubin Total 1.3 0.2 - 1.3 mg/dL    Albumin 3.0 (L) 3.4 - 5.0 g/dL    Protein Total 7.3 6.8 - 8.8 g/dL    Alkaline Phosphatase 74 40 - 150 U/L    ALT 43 0 - 70 U/L    AST 44 0 - 45 U/L   Blood culture    Specimen: Blood    Right Arm   Result Value Ref Range    Specimen Description Blood Right Arm     Culture Micro PENDING    Symptomatic COVID-19 Virus (Coronavirus) by PCR    Specimen: Nasopharyngeal   Result Value Ref Range    COVID-19 Virus PCR to [facility] - Source Nasopharyngeal     COVID-19 Virus PCR to [facility] - Result       Test received-See reflex to IDDL test SARS CoV2 (COVID-19) Virus RT-PCR   Magnesium   Result Value Ref Range    Magnesium 1.9 1.6 - 2.3 mg/dL   Nt probnp inpatient   Result Value Ref Range    N-Terminal Pro BNP Inpatient 1,892 (H) 0 - 1,800 pg/mL   XR Chest Port 1 View    Narrative    XR CHEST PORT 1 VW 7/7/2020 1:08 PM    HISTORY: cough, fever    COMPARISON: None.    FINDINGS: Minimal bibasilar scarring. The lungs are otherwise clear.  No pleural effusion. Normal heart size.      Impression    IMPRESSION:No acute cardiopulmonary process. No pneumonic  consolidation or pleural effusion.    EDWAR LARSON MD   UA reflex to Microscopic and Culture    Specimen: Unspecified Urine   Result Value Ref Range    Color Urine Kelsea     Appearance Urine Slightly Cloudy     Glucose Urine Negative NEG^Negative mg/dL    Bilirubin Urine Negative NEG^Negative    Ketones Urine 5 (A) NEG^Negative mg/dL    Specific Gravity Urine 1.030 1.003 - 1.035    Blood Urine Moderate (A) NEG^Negative    pH Urine 5.0 5.0 - 7.0 pH    Protein Albumin Urine >499 (A) NEG^Negative mg/dL    Urobilinogen mg/dL 0.0 0.0 - 2.0 mg/dL    Nitrite Urine Negative NEG^Negative    Leukocyte Esterase Urine Negative  NEG^Negative    Source Unspecified Urine     RBC Urine 2 0 - 2 /HPF    WBC Urine None 0 - 5 /HPF    Mucous Urine Present (A) NEG^Negative /LPF    Hyaline Casts 49 (H) 0 - 2 /LPF   Troponin I (second draw)   Result Value Ref Range    Troponin I ES 0.108 (H) 0.000 - 0.045 ug/L       Medications   sodium chloride 0.9% infusion ( Intravenous Stopped 7/7/20 1550)   diltiazem (CARDIZEM) 125 mg in sodium chloride 0.9 % 125 mL infusion (10 mg/hr Intravenous Rate/Dose Change 7/7/20 1527)   diltiazem (CARDIZEM) injection 25 mg (25 mg Intravenous Given 7/7/20 1235)     75-year-old male presents with a 4 to 5-day history of increasing shortness of breath and not feeling well.  Patient was concerned because he was exposed to someone with COVID-19.  Upon arrival patient was noted to be significantly tachycardic but afebrile.  Exam was unremarkable.  EKG showed A. fib with RVR.  Labs were mostly unremarkable other than a slightly elevated troponin and an elevated BNP.  X-ray was clear.  Initially gave patient on diltiazem bolus which did bring the heart rate down to about 110 for a little while the patient then went back up.  We then initiated diltiazem drip.  I called at 115 to have the patient admitted.  Our hospitalist service was concerned about the slightly elevated troponin and recommend getting a second 2-hour troponin to make sure was not going up significantly.  We kept the patient down here an additional 2-1/2 hours and when the second troponin did come back slightly elevated, I consulted cardiology at [facility].  I spoke to Dr. Beckett.  Who agreed that this elevated troponin was most likely from demand ischemia.  He would not recommend transfer.  Would recommend working on continued rate control and recommends getting an echocardiogram in the morning.  Patient can follow-up with them as an outpatient.  I then called our hospitalist back and they will accept the patient now.    Assessments & Plan (with Medical Decision  Making)  DANGELO fib with RVR, acute congestive heart failure, demand ischemia     I have reviewed the nursing notes.    I have reviewed the findings, diagnosis, plan and need for follow up with the patient.              7/7/2020   [facility] EMERGENCY DEPARTMENT     Alex Benavides MD  07/07/20 1551

## 2020-07-07 NOTE — PLAN OF CARE
S-(situation): Patient arrives to room 217 via cart from ED    B-(background): SOB, Cough, Diarrhea, Chest tightness    A-(assessment): HTN, afib RVR, Unuc732.2 on admit to unit.  Patient has poor perfusion with delayed cap refill (noted LLE cold and bluish = md notified).  Tylenol admin for fever.  Potassium replaced per high protocol.  PHOSPHORUS level 2.4 low, pink slip left for MD. PAULdimer elevated at 2.0    R-(recommendations): Orders reviewed with patient. Will monitor patient per MD orders.      Inpatient nursing criteria listed below were met:    Health care directives status obtained and documented: Yes  SCD's Documented: No  Skin issues/needs documented:Yes  Isolation addressed and Signage used: Yes  Fall Prevention: Care plan updated, Education given and documented Yes  Care Plan initiated: Yes  Education Assessment documented:Yes  Education Documented (Pre-existing chronic infection such as, MRSA/VRE need education on admission): Yes  Allergies Reviewed: Yes  Admission Medication Reconciliation completed: Yes  New medication patient education completed and documented (Possible Side Effects of Common Medications handout): Yes  Home medications if not able to send immediately home with family stored here: No -n Stored in med bin in ICU  Reminder note placed in discharge instructions: Yes  Discharge planning review completed (admission navigator) Yes

## 2020-07-07 NOTE — H&P
[facility]    History and Physical - Hospitalist Service       Date of Admission:  7/7/2020    Assessment & Plan   Jimi Moreno is a 75 year old male with a history of hypertension, BPH presents with 4-day history of generalized weakness, cough, shortness of breath, diarrhea.- admitted on 7/7/2020.     #Multitude of symptoms suggesting viral illness.  High probability of COVID with recent sick contact  -O2 to maintain sats greater than 91%  -COVID isolation  -Adequate hydration  -PRN Tylenol  -His WBC was borderline high likely related to dehydration.  Lactic acid is within normal limits.  Will check procalcitonin if it is high and he continues to have fever will have low threshold to start him on empiric antibiotics.  -Panculture    #Atrial fibrillation with rapid ventricular rate  -Likely related to dehydration, infection.  Rate control: He was started on diltiazem drip in the ED but still rates persistently 1 10-1 30, although developed new fever.  We will give him fluid bolus as appears dehydrated before titrating further his diltiazem drip.  Also will give him Tylenol to ensure the temperature is well controlled.  -If the rate does not get controlled may consider changing diltiazem drip to esmolol drip.  Anticoagulation: His chads vascular 2 score is 2 due to his age.  He will likely need Coumadin/NOAC on discharge.  For now we will also start him on heparin GTT.  -Echocardiogram to evaluate for any valvular dysfunction once the rate is better  -Ensure K greater than 4 and magnesium greater than 2  -We will check thyroid function test    # FELIX; likely from dehydration.   - IVF  - Renal USS  - strict I/O    #Mild hematuria: He denies any symptoms  -Monitor hemoglobin and urine output and ensure adequate hydration.    #BPH  -Continue home meds    #Diarrhea: We will get enteric panel  -      Diet: regular  DVT Prophylaxis: Enoxaparin (Lovenox) SQ  Ash Catheter: not present  Code  "Status: full  Rule Out COVID-19 Handoff:  Jimi is NOT A LOW SUSPICION PUI (needs further investigation).    Follow these instructions:    If COVID test is positive -> continue isolation precautions    If 1st COVID test is negative -> continue isolation precautions    -  Order a repeat COVID test to be done 72 hours after the 1st test  -  Place \"PUI Isolation\" nurse communication order  -  Consider ID consult    If 2nd COVID test performed after 72 hours is negative -> consider discontinuing COVID-specific isolation precautions if clinical course atypical for COVID and/or an alternative diagnosis emerges       Disposition Plan   Expected discharge: 2 - 3 days, recommended to prior living arrangement once antibiotic plan established and SIRS/Sepsis treated.  Entered: Brent Chapman MD, MD 07/07/2020, 3:51 PM     The patient's care was discussed with the Bedside Nurse, Patient and ED Team.    Brent Chapman MD, MD  [facility]    ______________________________________________________________________    Chief Complaint   generalized weakness, cough, shortness of breath, diarrhea.    History is obtained from the patient    History of Present Illness   Jimi Moreno is a 75 year old male with a history of hypertension, BPH presents with 4-day history of generalized weakness, cough, shortness of breath, diarrhea.  As per him he was in contact with his grandson who tested positive for COVID.    He denies any fever but says he feels short of breath on minimal exertion.  Also has been having significant amount of loose watery stools every day.  Denies any blood or mucus in the stools and denies any associated nausea.   He denies any recent sick contacts or recent travel    Denies any dysuria or noticing any blood in urine although was noted to have some dripping of blood in ICU.      Review of Systems    The 10 point Review of Systems is negative other than noted in the HPI or here.     Past " "Medical History    I have reviewed this patient's medical history and updated it with pertinent information if needed.   Past Medical History:   Diagnosis Date     Sinusitis acute      Unspecified essential hypertension      Varicose vein        Past Surgical History   I have reviewed this patient's surgical history and updated it with pertinent information if needed.  Past Surgical History:   Procedure Laterality Date     COLONOSCOPY N/A 11/2/2017    Procedure: COMBINED COLONOSCOPY, SINGLE OR MULTIPLE BIOPSY/POLYPECTOMY BY BIOPSY;;  Surgeon: Sanjay Chang MD;  Location: [facility] OR     COLONOSCOPY WITH CO2 INSUFFLATION N/A 11/2/2017    Procedure: COLONOSCOPY WITH CO2 INSUFFLATION;  COLON SCREEN/ ZOWNIROWYCZ;  Surgeon: Sanjay Chang MD;  Location: [facility] OR     NO HISTORY OF SURGERY         Social History   I have reviewed this patient's social history and updated it with pertinent information if needed.      Family History   No significant family history    Prior to Admission Medications   Prior to Admission Medications   Prescriptions Last Dose Informant Patient Reported? Taking?   finasteride (PROSCAR) 5 MG tablet 7/4/2020 at 2100  No Yes   Sig: Take 1 tablet (5 mg) by mouth daily   lisinopril (ZESTRIL) 40 MG tablet 7/4/2020 at 2100  No No   Sig: Take 1 tablet by mouth once daily   tamsulosin (FLOMAX) 0.4 MG capsule 7/4/2020 at 2100  No No   Sig: Take 2 capsules (0.8 mg) by mouth daily      Facility-Administered Medications: None     Allergies   Allergies   Allergen Reactions     Pollen Extract/Tree Extract Other (See Comments)     Oak pollen         Physical Exam   /89   Pulse 110   Temp 102.2  F (39  C) (Oral)   Resp 20   Ht 1.867 m (6' 1.5\")   Wt 103 kg (227 lb)   SpO2 94%   BMI 29.54 kg/m    Gen- pleasant laying on bed  HEENT- NAD, YVETTE, clinically dry  Neck- supple, no JVD elevation, no thyromegaly  CVS- I+II+0, irregularly irregular  RS- CTAB, decreased at bases  Abdo- soft, no " tenderness . No g/r/r  Ext- no edema   CNS- no gross focal signs       Data   Data reviewed today: I reviewed all medications, new labs and imaging results over the last 24 hours. I personally reviewed the chest x-ray image(s) showing Clear lungs.    BMP  Recent Labs   Lab 07/07/20  1747 07/07/20  1224    132*   POTASSIUM 3.8 3.8   CHLORIDE 107 101   MELIDA 8.0* 8.5   CO2 23 21   BUN 25 24   CR 1.37* 1.54*   * 142*     CBC  Recent Labs   Lab 07/07/20  1224   WBC 11.8*   RBC 5.15   HGB 16.0   HCT 46.2   MCV 90   MCH 31.1   MCHC 34.6   RDW 12.2        INRNo lab results found in last 7 days.  LFTs  Recent Labs   Lab 07/07/20  1224   ALKPHOS 74   AST 44   ALT 43   BILITOTAL 1.3   PROTTOTAL 7.3   ALBUMIN 3.0*      PANCNo lab results found in last 7 days.  Inflammatory Markers    Recent Labs   Lab Test 07/07/20  1747   .0*     Lactic acid 1.5    D-dimer 2.0

## 2020-07-07 NOTE — ED NOTES
ED Nursing criteria listed below was addressed during verbal handoff:     Abnormal vitals: Yes  Abnormal results: Yes  Med Reconciliation completed: Yes  Meds given in ED: Yes  Any Overdue Meds: N/A  Core Measures: N/A  Isolation: Yes  Special needs: N/A  Skin assessment: N/A    Observation Patient  Education provided: N/A

## 2020-07-08 ENCOUNTER — APPOINTMENT (OUTPATIENT)
Dept: CARDIOLOGY | Facility: CLINIC | Age: 75
DRG: 194 | End: 2020-07-08
Attending: INTERNAL MEDICINE
Payer: COMMERCIAL

## 2020-07-08 ENCOUNTER — APPOINTMENT (OUTPATIENT)
Dept: CT IMAGING | Facility: CLINIC | Age: 75
DRG: 194 | End: 2020-07-08
Attending: PEDIATRICS
Payer: COMMERCIAL

## 2020-07-08 PROBLEM — R39.14 BENIGN PROSTATIC HYPERPLASIA WITH INCOMPLETE BLADDER EMPTYING: Status: ACTIVE | Noted: 2020-07-08

## 2020-07-08 PROBLEM — R79.89 ELEVATED D-DIMER: Status: ACTIVE | Noted: 2020-07-08

## 2020-07-08 PROBLEM — J18.9 PNEUMONIA OF RIGHT LOWER LOBE DUE TO INFECTIOUS ORGANISM: Status: ACTIVE | Noted: 2020-07-08

## 2020-07-08 PROBLEM — Z20.822 PERSON UNDER INVESTIGATION FOR COVID-19: Status: ACTIVE | Noted: 2020-07-08

## 2020-07-08 PROBLEM — R65.10 SIRS (SYSTEMIC INFLAMMATORY RESPONSE SYNDROME) (H): Status: ACTIVE | Noted: 2020-07-08

## 2020-07-08 PROBLEM — A41.9 SEPSIS (H): Status: ACTIVE | Noted: 2020-07-08

## 2020-07-08 PROBLEM — R19.7 DIARRHEA: Status: ACTIVE | Noted: 2020-07-08

## 2020-07-08 PROBLEM — N28.89 RIGHT RENAL MASS: Status: ACTIVE | Noted: 2020-07-08

## 2020-07-08 PROBLEM — N17.9 ACUTE KIDNEY INJURY (H): Status: ACTIVE | Noted: 2020-07-08

## 2020-07-08 PROBLEM — R06.02 SHORTNESS OF BREATH: Status: ACTIVE | Noted: 2020-07-08

## 2020-07-08 PROBLEM — N40.1 BENIGN PROSTATIC HYPERPLASIA WITH INCOMPLETE BLADDER EMPTYING: Status: ACTIVE | Noted: 2020-07-08

## 2020-07-08 LAB
ANION GAP SERPL CALCULATED.3IONS-SCNC: 5 MMOL/L (ref 3–14)
BUN SERPL-MCNC: 22 MG/DL (ref 7–30)
CALCIUM SERPL-MCNC: 7.8 MG/DL (ref 8.5–10.1)
CHLORIDE SERPL-SCNC: 108 MMOL/L (ref 94–109)
CO2 SERPL-SCNC: 24 MMOL/L (ref 20–32)
CREAT SERPL-MCNC: 1.17 MG/DL (ref 0.66–1.25)
ERYTHROCYTE [DISTWIDTH] IN BLOOD BY AUTOMATED COUNT: 12.6 % (ref 10–15)
GFR SERPL CREATININE-BSD FRML MDRD: 60 ML/MIN/{1.73_M2}
GLUCOSE BLDC GLUCOMTR-MCNC: 101 MG/DL (ref 70–99)
GLUCOSE BLDC GLUCOMTR-MCNC: 109 MG/DL (ref 70–99)
GLUCOSE BLDC GLUCOMTR-MCNC: 111 MG/DL (ref 70–99)
GLUCOSE BLDC GLUCOMTR-MCNC: 111 MG/DL (ref 70–99)
GLUCOSE BLDC GLUCOMTR-MCNC: 140 MG/DL (ref 70–99)
GLUCOSE BLDC GLUCOMTR-MCNC: 160 MG/DL (ref 70–99)
GLUCOSE SERPL-MCNC: 115 MG/DL (ref 70–99)
HCT VFR BLD AUTO: 44.4 % (ref 40–53)
HGB BLD-MCNC: 14.9 G/DL (ref 13.3–17.7)
LACTATE BLD-SCNC: 1 MMOL/L (ref 0.7–2)
LMWH PPP CHRO-ACNC: 0.27 IU/ML
LMWH PPP CHRO-ACNC: 0.45 IU/ML
LMWH PPP CHRO-ACNC: <0.1 IU/ML
MAGNESIUM SERPL-MCNC: 2.4 MG/DL (ref 1.6–2.3)
MCH RBC QN AUTO: 30.8 PG (ref 26.5–33)
MCHC RBC AUTO-ENTMCNC: 33.6 G/DL (ref 31.5–36.5)
MCV RBC AUTO: 92 FL (ref 78–100)
PLATELET # BLD AUTO: 157 10E9/L (ref 150–450)
POTASSIUM SERPL-SCNC: 4.2 MMOL/L (ref 3.4–5.3)
PROCALCITONIN SERPL-MCNC: 3.54 NG/ML
RBC # BLD AUTO: 4.84 10E12/L (ref 4.4–5.9)
SODIUM SERPL-SCNC: 137 MMOL/L (ref 133–144)
WBC # BLD AUTO: 9 10E9/L (ref 4–11)

## 2020-07-08 PROCEDURE — 83605 ASSAY OF LACTIC ACID: CPT | Performed by: PEDIATRICS

## 2020-07-08 PROCEDURE — 83735 ASSAY OF MAGNESIUM: CPT | Performed by: INTERNAL MEDICINE

## 2020-07-08 PROCEDURE — 25000125 ZZHC RX 250: Performed by: INTERNAL MEDICINE

## 2020-07-08 PROCEDURE — 87086 URINE CULTURE/COLONY COUNT: CPT | Performed by: PEDIATRICS

## 2020-07-08 PROCEDURE — 25000132 ZZH RX MED GY IP 250 OP 250 PS 637: Performed by: INTERNAL MEDICINE

## 2020-07-08 PROCEDURE — 85027 COMPLETE CBC AUTOMATED: CPT | Performed by: INTERNAL MEDICINE

## 2020-07-08 PROCEDURE — 36415 COLL VENOUS BLD VENIPUNCTURE: CPT | Performed by: INTERNAL MEDICINE

## 2020-07-08 PROCEDURE — 00000146 ZZHCL STATISTIC GLUCOSE BY METER IP

## 2020-07-08 PROCEDURE — 93308 TTE F-UP OR LMTD: CPT | Mod: 26 | Performed by: INTERNAL MEDICINE

## 2020-07-08 PROCEDURE — 85520 HEPARIN ASSAY: CPT | Performed by: PEDIATRICS

## 2020-07-08 PROCEDURE — 20000003 ZZH R&B ICU

## 2020-07-08 PROCEDURE — 87899 AGENT NOS ASSAY W/OPTIC: CPT | Performed by: PEDIATRICS

## 2020-07-08 PROCEDURE — 25800030 ZZH RX IP 258 OP 636: Performed by: PEDIATRICS

## 2020-07-08 PROCEDURE — 93325 DOPPLER ECHO COLOR FLOW MAPG: CPT

## 2020-07-08 PROCEDURE — 99233 SBSQ HOSP IP/OBS HIGH 50: CPT | Performed by: PEDIATRICS

## 2020-07-08 PROCEDURE — 87506 IADNA-DNA/RNA PROBE TQ 6-11: CPT | Performed by: PEDIATRICS

## 2020-07-08 PROCEDURE — 25000128 H RX IP 250 OP 636: Performed by: PEDIATRICS

## 2020-07-08 PROCEDURE — 71275 CT ANGIOGRAPHY CHEST: CPT

## 2020-07-08 PROCEDURE — 80048 BASIC METABOLIC PNL TOTAL CA: CPT | Performed by: INTERNAL MEDICINE

## 2020-07-08 PROCEDURE — 87040 BLOOD CULTURE FOR BACTERIA: CPT | Performed by: PEDIATRICS

## 2020-07-08 PROCEDURE — 25500064 ZZH RX 255 OP 636: Performed by: INTERNAL MEDICINE

## 2020-07-08 PROCEDURE — 84145 PROCALCITONIN (PCT): CPT | Performed by: PEDIATRICS

## 2020-07-08 PROCEDURE — 25000125 ZZHC RX 250: Performed by: RADIOLOGY

## 2020-07-08 PROCEDURE — 25000128 H RX IP 250 OP 636: Performed by: RADIOLOGY

## 2020-07-08 PROCEDURE — 36415 COLL VENOUS BLD VENIPUNCTURE: CPT | Performed by: PEDIATRICS

## 2020-07-08 PROCEDURE — 25800030 ZZH RX IP 258 OP 636: Performed by: INTERNAL MEDICINE

## 2020-07-08 PROCEDURE — 93325 DOPPLER ECHO COLOR FLOW MAPG: CPT | Mod: 26 | Performed by: INTERNAL MEDICINE

## 2020-07-08 PROCEDURE — 74170 CT ABD WO CNTRST FLWD CNTRST: CPT

## 2020-07-08 PROCEDURE — 25000128 H RX IP 250 OP 636: Performed by: INTERNAL MEDICINE

## 2020-07-08 PROCEDURE — 93321 DOPPLER ECHO F-UP/LMTD STD: CPT | Mod: 26 | Performed by: INTERNAL MEDICINE

## 2020-07-08 PROCEDURE — 85520 HEPARIN ASSAY: CPT | Performed by: INTERNAL MEDICINE

## 2020-07-08 RX ORDER — IOPAMIDOL 755 MG/ML
500 INJECTION, SOLUTION INTRAVASCULAR ONCE
Status: COMPLETED | OUTPATIENT
Start: 2020-07-08 | End: 2020-07-08

## 2020-07-08 RX ORDER — CEFTRIAXONE 2 G/1
2 INJECTION, POWDER, FOR SOLUTION INTRAMUSCULAR; INTRAVENOUS EVERY 24 HOURS
Status: DISCONTINUED | OUTPATIENT
Start: 2020-07-08 | End: 2020-07-11 | Stop reason: HOSPADM

## 2020-07-08 RX ADMIN — DILTIAZEM HYDROCHLORIDE 15 MG/HR: 5 INJECTION INTRAVENOUS at 06:31

## 2020-07-08 RX ADMIN — SODIUM CHLORIDE: 9 INJECTION, SOLUTION INTRAVENOUS at 03:01

## 2020-07-08 RX ADMIN — TAMSULOSIN HYDROCHLORIDE 0.8 MG: 0.4 CAPSULE ORAL at 20:43

## 2020-07-08 RX ADMIN — CEFTRIAXONE SODIUM 2 G: 2 INJECTION, POWDER, FOR SOLUTION INTRAMUSCULAR; INTRAVENOUS at 19:11

## 2020-07-08 RX ADMIN — ACETAMINOPHEN 650 MG: 325 TABLET, FILM COATED ORAL at 15:09

## 2020-07-08 RX ADMIN — ACETAMINOPHEN 650 MG: 325 TABLET, FILM COATED ORAL at 06:37

## 2020-07-08 RX ADMIN — IOPAMIDOL 100 ML: 755 INJECTION, SOLUTION INTRAVENOUS at 13:05

## 2020-07-08 RX ADMIN — HEPARIN SODIUM 1350 UNITS/HR: 10000 INJECTION, SOLUTION INTRAVENOUS at 18:54

## 2020-07-08 RX ADMIN — DILTIAZEM HYDROCHLORIDE 15 MG/HR: 5 INJECTION INTRAVENOUS at 23:55

## 2020-07-08 RX ADMIN — SODIUM CHLORIDE 70 ML: 9 INJECTION, SOLUTION INTRAVENOUS at 13:05

## 2020-07-08 RX ADMIN — HEPARIN SODIUM 1350 UNITS/HR: 10000 INJECTION, SOLUTION INTRAVENOUS at 11:53

## 2020-07-08 RX ADMIN — ACETAMINOPHEN 650 MG: 325 TABLET, FILM COATED ORAL at 20:42

## 2020-07-08 RX ADMIN — SODIUM CHLORIDE: 9 INJECTION, SOLUTION INTRAVENOUS at 11:16

## 2020-07-08 RX ADMIN — FINASTERIDE 5 MG: 5 TABLET, FILM COATED ORAL at 20:43

## 2020-07-08 RX ADMIN — HEPARIN SODIUM 1500 UNITS/HR: 10000 INJECTION, SOLUTION INTRAVENOUS at 11:07

## 2020-07-08 RX ADMIN — AZITHROMYCIN MONOHYDRATE 500 MG: 500 INJECTION, POWDER, LYOPHILIZED, FOR SOLUTION INTRAVENOUS at 20:03

## 2020-07-08 RX ADMIN — HUMAN ALBUMIN MICROSPHERES AND PERFLUTREN 3 ML: 10; .22 INJECTION, SOLUTION INTRAVENOUS at 08:59

## 2020-07-08 ASSESSMENT — ACTIVITIES OF DAILY LIVING (ADL)
ADLS_ACUITY_SCORE: 13

## 2020-07-08 NOTE — PROGRESS NOTES
[facility]    Medicine Progress Note - Hospitalist Service       Date of Admission:  7/7/2020  Assessment & Plan   75-year-old man admitted yesterday with new onset atrial fibrillation with rapid ventricular response and acute kidney injury with test results today now consistent with right lower lobe pneumonia which was present at admission although not initially radiographically evident.  In the context of new diagnosis of pneumonia today, his new fever overnight with persistent tachycardia and initial acute kidney injury are also consistent with sepsis which was probably also present at admission but less evident clinically.  Cause from her pneumonia remains unclear, but elevated procalcitonin is suspicious for bacterial infection.  He remains under investigation for COVID-19 and is not considered low suspicion for it.  He also has had diarrhea of unclear specific etiology.  Incidentally, right renal mass was noted radiographically of unclear significance clinically.  Radiographic findings were also consistent with BPH with incomplete bladder emptying and he is normally receiving medication treatment for BPH.    Principal Problem:    Pneumonia of right lower lobe due to infectious organism  Active Problems:    Atrial fibrillation with RVR (H)    Sepsis (H)    Acute kidney injury (H)    Diarrhea    Elevated d-dimer    Person under investigation for COVID-19    Right renal mass-possible    Benign prostatic hyperplasia with incomplete bladder emptying    Start ceftriaxone and azithromycin empirically for treatment of community-acquired pneumonia  Continuing IV diltiazem infusion in the ICU for now, anticipate transition to oral diltiazem in the next 24 hours after starting antibiotics as he starts to improve and fever subsides  Anticipate retesting for COVID-19 on July 10, continue special precautions isolation for now  Check stool testing for enteric pathogens when able  Continue IV  heparin for now with plan to discuss prophylactic anticoagulation treatment options with the patient because of atrial fibrillation prior to discharge       Diet: Renal Diet (non-dialysis)    DVT Prophylaxis: IV heparin  Ash Catheter: not present  Code Status: Full code         Disposition Plan   Expected discharge: 2 to 4 days, recommended to prior living arrangement once antibiotic plan established, SIRS/Sepsis treated and Atrial fibrillation is controlled or resolved.  Entered: Dennis Rodriguez MD 07/08/2020, 11:57 AM       The patient's care was discussed with the Bedside Nurse and Patient.    Dennis Rodriguez MD  Hospitalist Service  [facility]    ______________________________________________________________________    Interval History   He feels about the same today so far.  He continues to feel short of breath particularly with activity.  He also remains tired although the fatigue seems less profound today.  He has noticed improvement in his appetite today and ate well at breakfast.  He developed new fever to 102.4 overnight but defervesced after a dose of Tylenol.  Tachycardia has improved on continuous IV diltiazem infusion but atrial fibrillation has persisted.  He has become more tachypneic today.  He remains mildly to moderately hypoxemic but so far has not required oxygen supplementation.  He denies any chest pain or tightness today.  He is voiding spontaneously.  He says he continues to have diarrhea although has not yet had any here in the hospital because he had not eaten until this morning.    Data reviewed today: I reviewed all medications, new labs and imaging results over the last 24 hours. I personally reviewed telemetry and cardiac monitor strips demonstrating atrial fibrillation with variable ventricular rate.    Physical Exam   Vital Signs: Temp: 99.8  F (37.7  C) Temp src: Oral BP: 131/86 Pulse: 112 Heart Rate: 95 Resp: (!) 32 SpO2: 90 % O2 Device: None  (Room air)    Weight: 227 lbs 0 oz  General Appearance: Elderly man appears mildly diaphoretic resting in bed, no acute distress, easily speaks full sentences  Respiratory: Tachypneic with otherwise normal respiratory effort, few inspiratory crackles present at the right lung base, no wheezing  Cardiovascular: Irregularly irregular rate and rhythm, good radial pulse, normal capillary refill  GI: Nondistended abdomen, soft, no tenderness     Data   Recent Labs   Lab 07/08/20  0654 07/07/20  1800 07/07/20  1747 07/07/20  1429 07/07/20  1224   WBC 9.0 10.2  --   --  11.8*   HGB 14.9 15.2  --   --  16.0   MCV 92 91  --   --  90    162  --   --  168     --  136  --  132*   POTASSIUM 4.2  --  3.8  --  3.8   CHLORIDE 108  --  107  --  101   CO2 24  --  23  --  21   BUN 22  --  25  --  24   CR 1.17  --  1.37*  --  1.54*   ANIONGAP 5  --  6  --  10   MELIDA 7.8*  --  8.0*  --  8.5   *  --  180*  --  142*   ALBUMIN  --   --   --   --  3.0*   PROTTOTAL  --   --   --   --  7.3   BILITOTAL  --   --   --   --  1.3   ALKPHOS  --   --   --   --  74   ALT  --   --   --   --  43   AST  --   --   --   --  44   TROPI  --   --  0.095* 0.108* 0.051*     Blood sugars ranged 101-160 over the last day    All blood cultures are negative so far  Procalcitonin at admission was elevated at 4.19  CRP at admission was 211    Urinalysis demonstrated large protein and moderate blood but was negative for leukocyte esterase and nitrites    Recent Results (from the past 24 hour(s))   XR Chest Port 1 View    Narrative    XR CHEST PORT 1 VW 7/7/2020 1:08 PM    HISTORY: cough, fever    COMPARISON: None.    FINDINGS: Minimal bibasilar scarring. The lungs are otherwise clear.  No pleural effusion. Normal heart size.      Impression    IMPRESSION:No acute cardiopulmonary process. No pneumonic  consolidation or pleural effusion.    EDWAR LARSON MD   US Renal Complete    Narrative    US RENAL COMPLETE 7/7/2020 6:55 PM    CLINICAL  HISTORY: Assess for evidence of obstruction, mass, kidney  size. NO IV contrast  TECHNIQUE: Routine Bilateral Renal and Bladder Ultrasound.    COMPARISON: None.    FINDINGS:    RIGHT KIDNEY: 11.7 cm. Normal cortical echotexture and thickness.  Grossly normal flow by color Doppler. In the lateral right interpolar  region is a 3.8 x 3.1 x 2.6 cm isoechoic possible solid mass. No  hydronephrosis.    LEFT KIDNEY: 12.2 cm. Normal cortical echotexture. Mild thinning.  Grossly normal flow by color Doppler. No hydronephrosis.     BLADDER: Prevoid volume is 288 mL. Postvoid volume is 110 mL. Normal  bilateral ureteral jets identified.      Impression    IMPRESSION:  1.  There is a 3.8 cm possible solid right renal mass. Neoplasm is not  excluded. If the patient cannot receive IV contrast, the please  consider renal MRI for further evaluation.    2.  Left kidney is unremarkable.    3.   Significant bladder post void residual at 38%.    RENNY BARRERA MD   Echo Limited    Narrative    [id number]  [id number]  [id number]  [id number]^FLORENCIA^MANUEL           [facility]  Echocardiography Laboratory  [address] [place] [address]        Name: CANDELARIO BARAJAS  MRN: [id number]  : 1945  Study Date: 2020 08:08 AM  Age: 75 yrs  Gender: Male  Patient Location: [facility]  Reason For Study: SOB  History: A fib with RVR, HTN,  Ordering Physician: MANUEL DON  Performed By: Peace Banegas     BSA: 2.3 m2  Height: 73 in  Weight: 229 lb  HR: 102  BP: 131/82 mmHg  _____________________________________________________________________________  __        Procedure  Limited Portable Echo Adult. Optison (NDC #8616-2912) given intravenously.  _____________________________________________________________________________  __        Interpretation Summary     The rhythm was atrial fibrillation.  Left ventricular systolic function is normal.  The visual ejection fraction is estimated at 60-65%.  The left  ventricle is normal in size.  There is mild concentric left ventricular hypertrophy.  The right ventricle is normal in structure, function and size.  There is moderate biatrial enlargement.  Doppler interrogation does not demonstrate significant stenosis or  insufficiency involving cardiac valves.     No old studies for compariosn.  _____________________________________________________________________________  __        Left Ventricle  The left ventricle is normal in size. There is mild concentric left  ventricular hypertrophy. Left ventricular systolic function is normal. The  visual ejection fraction is estimated at 60-65%. No regional wall motion  abnormalities noted. There is no thrombus seen in the left ventricle.     Right Ventricle  The right ventricle is normal in structure, function and size. There is no  mass or thrombus in the right ventricle.     Atria  There is moderate biatrial enlargement. There is no atrial shunt seen. The  left atrial appendage is not well visualized.     Mitral Valve  The mitral valve leaflets appear normal. There is no evidence of stenosis,  fluttering, or prolapse. There is no mitral regurgitation noted. There is no  mitral valve stenosis.        Tricuspid Valve  Normal tricuspid valve. The right ventricular systolic pressure is  approximated at 16.8 mmHg plus the right atrial pressure. Right ventricle  systolic pressure estimate normal. There is mild (1+) tricuspid regurgitation.  There is no tricuspid stenosis.     Aortic Valve  The aortic valve is trileaflet. No aortic regurgitation is present. No aortic  stenosis is present.     Pulmonic Valve  Normal pulmonic valve. There is no pulmonic valvular regurgitation. There is  no pulmonic valvular stenosis.     Vessels  The aortic root is normal size. Normal size ascending aorta. Inferior vena  cava not well visualized for estimation of right atrial pressure. The  pulmonary artery is normal size.     Pericardium  The pericardium  appears normal. There is no pleural effusion.        Rhythm  The rhythm was atrial fibrillation.  _____________________________________________________________________________  __  MMode/2D Measurements & Calculations     IVSd: 1.3 cm  LVIDd: 4.6 cm  LVIDs: 3.5 cm  LVPWd: 1.2 cm  FS: 25.2 %  LV mass(C)d: 216.1 grams  LV mass(C)dI: 94.8 grams/m2  Ao root diam: 3.5 cm  LA dimension: 5.0 cm  asc Aorta Diam: 3.8 cm  LA/Ao: 1.4  RWT: 0.52        Doppler Measurements & Calculations  Ao V2 max: 117.6 cm/sec  Ao max P.0 mmHg  TR max ca: 205.0 cm/sec  TR max P.8 mmHg              _____________________________________________________________________________  __        Report approved by: Dr. Low Menendez 2020 11:38 AM        Medications     diltiazem (CARDIZEM) infusion ADULT 15 mg/hr (20 0631)     HEParin 1,350 Units/hr (20 1153)     sodium chloride 125 mL/hr at 20 1116       finasteride  5 mg Oral Daily     tamsulosin  0.8 mg Oral Daily

## 2020-07-08 NOTE — PLAN OF CARE
S-(situation): shift note    B-(background): atrial fib, respiratory distress, diarrhea    A-(assessment): patient alert and oriented. Lungs clear with right base having fine crackles. IV fluid at 125/hr and he is taking adequate oral liquid intake. Abdomen soft nontender but he does not have any further diarrhea overnight. Awaiting to obtain a stool sample. Heart is Afib rate  with activity it is up to 150/hr. Cardiazem continue at 15 mg per hour and heparin is at 1500 U/hr. He stated that he feels beter than yesturday but had a fever of 102.4 this am and was treated with tylenol.    R-(recommendations): continue to montior heart rate, respiratory status and temp notify MD of any changes.

## 2020-07-08 NOTE — PROGRESS NOTES
S-(situation): Note    B-(background): Afib with RVR    A-(assessment):Please review system assessment and VS.  Note that pt has been temping throughout the day.  Tylenol given  High temp 102.4 oral.  Pt remains on a cardizem gtt and heparin infusion per protocol.  Pt did have one stool and sample sent as ordered.  Pt did go to CT scan for pulmonary scan this afternoon.  Pt stands at bedside to void    R-(recommendations): Cont poc as ordered

## 2020-07-09 PROBLEM — N28.89 RIGHT RENAL MASS: Status: RESOLVED | Noted: 2020-07-08 | Resolved: 2020-07-09

## 2020-07-09 LAB
ANION GAP SERPL CALCULATED.3IONS-SCNC: 7 MMOL/L (ref 3–14)
BACTERIA SPEC CULT: ABNORMAL
BACTERIA SPEC CULT: NORMAL
BUN SERPL-MCNC: 14 MG/DL (ref 7–30)
C COLI+JEJUNI+LARI FUSA STL QL NAA+PROBE: NOT DETECTED
CALCIUM SERPL-MCNC: 7.6 MG/DL (ref 8.5–10.1)
CHLORIDE SERPL-SCNC: 108 MMOL/L (ref 94–109)
CO2 SERPL-SCNC: 22 MMOL/L (ref 20–32)
CREAT SERPL-MCNC: 0.99 MG/DL (ref 0.66–1.25)
EC STX1 GENE STL QL NAA+PROBE: NOT DETECTED
EC STX2 GENE STL QL NAA+PROBE: NOT DETECTED
ENTERIC PATHOGEN COMMENT: NORMAL
GFR SERPL CREATININE-BSD FRML MDRD: 73 ML/MIN/{1.73_M2}
GLUCOSE BLDC GLUCOMTR-MCNC: 104 MG/DL (ref 70–99)
GLUCOSE SERPL-MCNC: 102 MG/DL (ref 70–99)
GRAM STN SPEC: ABNORMAL
LACTATE BLD-SCNC: 1 MMOL/L (ref 0.7–2)
LMWH PPP CHRO-ACNC: 0.18 IU/ML
Lab: ABNORMAL
Lab: NORMAL
NOROV GI+II ORF1-ORF2 JNC STL QL NAA+PR: NOT DETECTED
POTASSIUM SERPL-SCNC: 3.7 MMOL/L (ref 3.4–5.3)
PROCALCITONIN SERPL-MCNC: 2.49 NG/ML
RVA NSP5 STL QL NAA+PROBE: NOT DETECTED
S PNEUM AG SPEC QL: NORMAL
SALMONELLA SP RPOD STL QL NAA+PROBE: NOT DETECTED
SHIGELLA SP+EIEC IPAH STL QL NAA+PROBE: NOT DETECTED
SODIUM SERPL-SCNC: 137 MMOL/L (ref 133–144)
SPECIMEN SOURCE: ABNORMAL
SPECIMEN SOURCE: ABNORMAL
SPECIMEN SOURCE: NORMAL
SPECIMEN SOURCE: NORMAL
V CHOL+PARA RFBL+TRKH+TNAA STL QL NAA+PR: NOT DETECTED
Y ENTERO RECN STL QL NAA+PROBE: NOT DETECTED

## 2020-07-09 PROCEDURE — 25000125 ZZHC RX 250: Performed by: INTERNAL MEDICINE

## 2020-07-09 PROCEDURE — 25800030 ZZH RX IP 258 OP 636: Performed by: INTERNAL MEDICINE

## 2020-07-09 PROCEDURE — 99233 SBSQ HOSP IP/OBS HIGH 50: CPT | Performed by: PEDIATRICS

## 2020-07-09 PROCEDURE — 12000000 ZZH R&B MED SURG/OB

## 2020-07-09 PROCEDURE — 25000128 H RX IP 250 OP 636: Performed by: INTERNAL MEDICINE

## 2020-07-09 PROCEDURE — 25000132 ZZH RX MED GY IP 250 OP 250 PS 637: Performed by: INTERNAL MEDICINE

## 2020-07-09 PROCEDURE — 80048 BASIC METABOLIC PNL TOTAL CA: CPT | Performed by: INTERNAL MEDICINE

## 2020-07-09 PROCEDURE — 25000132 ZZH RX MED GY IP 250 OP 250 PS 637: Performed by: PEDIATRICS

## 2020-07-09 PROCEDURE — 36415 COLL VENOUS BLD VENIPUNCTURE: CPT | Performed by: INTERNAL MEDICINE

## 2020-07-09 PROCEDURE — 83605 ASSAY OF LACTIC ACID: CPT | Performed by: PEDIATRICS

## 2020-07-09 PROCEDURE — 00000146 ZZHCL STATISTIC GLUCOSE BY METER IP

## 2020-07-09 PROCEDURE — 25000128 H RX IP 250 OP 636: Performed by: PEDIATRICS

## 2020-07-09 PROCEDURE — 85520 HEPARIN ASSAY: CPT | Performed by: INTERNAL MEDICINE

## 2020-07-09 PROCEDURE — 84145 PROCALCITONIN (PCT): CPT | Performed by: INTERNAL MEDICINE

## 2020-07-09 PROCEDURE — 87205 SMEAR GRAM STAIN: CPT | Performed by: PEDIATRICS

## 2020-07-09 RX ORDER — DILTIAZEM HYDROCHLORIDE 30 MG/1
120 TABLET, FILM COATED ORAL EVERY 8 HOURS SCHEDULED
Status: DISCONTINUED | OUTPATIENT
Start: 2020-07-09 | End: 2020-07-09

## 2020-07-09 RX ORDER — DILTIAZEM HYDROCHLORIDE 180 MG/1
360 CAPSULE, COATED, EXTENDED RELEASE ORAL DAILY
Status: DISCONTINUED | OUTPATIENT
Start: 2020-07-10 | End: 2020-07-10

## 2020-07-09 RX ORDER — AZITHROMYCIN 250 MG/1
250 TABLET, FILM COATED ORAL DAILY
Status: DISCONTINUED | OUTPATIENT
Start: 2020-07-10 | End: 2020-07-11 | Stop reason: HOSPADM

## 2020-07-09 RX ORDER — DILTIAZEM HYDROCHLORIDE 30 MG/1
90 TABLET, FILM COATED ORAL EVERY 6 HOURS SCHEDULED
Status: DISCONTINUED | OUTPATIENT
Start: 2020-07-09 | End: 2020-07-09

## 2020-07-09 RX ORDER — DILTIAZEM HYDROCHLORIDE 30 MG/1
120 TABLET, FILM COATED ORAL EVERY 6 HOURS SCHEDULED
Status: COMPLETED | OUTPATIENT
Start: 2020-07-09 | End: 2020-07-09

## 2020-07-09 RX ORDER — WARFARIN SODIUM 5 MG/1
5 TABLET ORAL
Status: COMPLETED | OUTPATIENT
Start: 2020-07-09 | End: 2020-07-09

## 2020-07-09 RX ADMIN — FINASTERIDE 5 MG: 5 TABLET, FILM COATED ORAL at 20:52

## 2020-07-09 RX ADMIN — WARFARIN SODIUM 5 MG: 5 TABLET ORAL at 17:44

## 2020-07-09 RX ADMIN — DILTIAZEM HYDROCHLORIDE 90 MG: 30 TABLET, FILM COATED ORAL at 11:52

## 2020-07-09 RX ADMIN — HEPARIN SODIUM 1350 UNITS/HR: 10000 INJECTION, SOLUTION INTRAVENOUS at 06:30

## 2020-07-09 RX ADMIN — POTASSIUM CHLORIDE 20 MEQ: 1500 TABLET, EXTENDED RELEASE ORAL at 07:49

## 2020-07-09 RX ADMIN — SODIUM CHLORIDE: 9 INJECTION, SOLUTION INTRAVENOUS at 04:09

## 2020-07-09 RX ADMIN — ACETAMINOPHEN 650 MG: 325 TABLET, FILM COATED ORAL at 01:01

## 2020-07-09 RX ADMIN — DILTIAZEM HYDROCHLORIDE 120 MG: 30 TABLET, FILM COATED ORAL at 23:44

## 2020-07-09 RX ADMIN — DILTIAZEM HYDROCHLORIDE 120 MG: 30 TABLET, FILM COATED ORAL at 17:43

## 2020-07-09 RX ADMIN — ACETAMINOPHEN 650 MG: 325 TABLET, FILM COATED ORAL at 06:29

## 2020-07-09 RX ADMIN — DILTIAZEM HYDROCHLORIDE 15 MG/HR: 5 INJECTION INTRAVENOUS at 09:30

## 2020-07-09 RX ADMIN — CEFTRIAXONE SODIUM 2 G: 2 INJECTION, POWDER, FOR SOLUTION INTRAMUSCULAR; INTRAVENOUS at 19:51

## 2020-07-09 RX ADMIN — TAMSULOSIN HYDROCHLORIDE 0.8 MG: 0.4 CAPSULE ORAL at 20:52

## 2020-07-09 RX ADMIN — ENOXAPARIN SODIUM 40 MG: 40 INJECTION SUBCUTANEOUS at 16:27

## 2020-07-09 ASSESSMENT — ACTIVITIES OF DAILY LIVING (ADL)
ADLS_ACUITY_SCORE: 14
ADLS_ACUITY_SCORE: 14
ADLS_ACUITY_SCORE: 13
ADLS_ACUITY_SCORE: 14

## 2020-07-09 ASSESSMENT — MIFFLIN-ST. JEOR: SCORE: 1870

## 2020-07-09 NOTE — PROGRESS NOTES
CLINICAL NUTRITION SERVICES  -  ASSESSMENT NOTE      RECOMMENDATIONS FOR MD/PROVIDER TO ORDER:   Review diet order for appropriateness for adequate kcal and protein intake to heal from acute illness   Recommendations Ordered by Registered Dietitian (RD): none   Future/Additional Recommendations:   continue to monitor and encourage adequate oral intake   Malnutrition: Patient does not meet two of the above criteria necessary for diagnosing malnutrition        REASON FOR ASSESSMENT  Jimi Moreno is a 75 year old male seen by Registered Dietitian for Admission Nutrition Risk Screen for reduced oral intake over the last month      NUTRITION HISTORY  Information obtained from EMR:  Principle problem: Pneumonia of right lower lobe due to infectious organism    Patient Active Problem List   Diagnosis     CARDIOVASCULAR SCREENING; LDL GOAL LESS THAN 160     Rash     Essential hypertension with goal blood pressure less than 140/90     Atrial fibrillation with RVR (H)     Sepsis (H)     Acute kidney injury (H)     Diarrhea     Pneumonia of right lower lobe due to infectious organism     Right renal mass-possible     Benign prostatic hyperplasia with incomplete bladder emptying     Elevated d-dimer     Person under investigation for COVID-19     -diarrhea PTA, SOB, no appetite    Information obtained from Pt:  -pt was exposed to grandson whom has confirmed COVID. Similar symptoms so pt after feeling ill, therefore pt came into hospital  -eating normal/well before last Friday the 3rd, Friday until admission pt was eating very minimally; definitely <50%, was trying to drink water and keep fluids going reports zero appetite  -UBW of about 230 lbs (unclothed)  -wife and pt are retired, not very active. Rest and sit around, lower activity level  -typical day: 2 meals   --B: cold cereal or cole and eggs or pancakes and sausage or 2 pieces of toast  --D: meat and potatoes of some sort, variety- not picky  -no snacking in between.  "Pt reports no urge to need to eat more based on activity level and age      CURRENT NUTRITION ORDERS  Diet Order:     Orders Placed This Encounter      Renal Diet (non-dialysis)      Current Intake/Tolerance:  7/9/20- 100% per report  7/8/20- 50%, 75%   7/7/20- a few bites  -pt reports improved appetite and enjoyed bfast this morning      NUTRITION FOCUSED PHYSICAL ASSESSMENT FOR DIAGNOSING MALNUTRITION)  Yes - visual only outside the glass door of ICU unit         Observed:    No nutrition-related physical findings observed    Obtained from Chart/Interdisciplinary Team:  -well developed    ANTHROPOMETRICS  Height: 6' 1.5\"  Weight: 238 lbs 12.8 oz (108.3 kg)  Body mass index is 31.08 kg/m .  Weight Status:  Obesity Grade I BMI 30-34.9  IBW: 187 lbs (85 kg)  % IBW: 127  Weight History:   Wt Readings from Last 8 Encounters:   07/09/20 108.3 kg (238 lb 12.8 oz)   09/17/19 105.9 kg (233 lb 8 oz)   07/23/19 104.8 kg (231 lb)   06/25/19 105.2 kg (232 lb)   11/21/18 107.8 kg (237 lb 9.6 oz)   05/01/18 105.7 kg (233 lb)   08/08/17 108.8 kg (239 lb 12.8 oz)   03/30/17 107 kg (236 lb)   -no wt lost and similar to pt reported UBW  Vitals:    07/07/20 1145 07/07/20 1709 07/09/20 0541   Weight: 104 kg (229 lb 4.8 oz) 103 kg (227 lb) 108.3 kg (238 lb 12.8 oz)   -wt up since admission probably associated to fluids given    Intake/Output Summary (Last 24 hours) at 7/9/2020 1037  Last data filed at 7/9/2020 0631  Gross per 24 hour   Intake 4377 ml   Output 850 ml   Net 3527 ml       LABS  Labs reviewed  Urea Nitrogen (mg/dL)   Date Value   07/09/2020 14     Creatinine (mg/dL)   Date Value   07/09/2020 0.99     GFR Estimate (mL/min/[1.73_m2])   Date Value   07/09/2020 73     Glucose (mg/dL)   Date Value   07/09/2020 102 (H)   07/08/2020 115 (H)   07/07/2020 180 (H)         MEDICATIONS  Medications reviewed  Current Facility-Administered Medications (nutrition-relevant)   Medication     diltiazem (CARDIZEM) 125 mg in sodium chloride " 0.9 % 125 mL infusion     magnesium sulfate  (replacement protocol)     potassium chloride (replacement protocol)     sodium chloride 0.9% infusion at 125 mL/hr         ASSESSED NUTRITION NEEDS PER APPROVED PRACTICE GUIDELINES:    Dosing Weight 90.8 kg (adjusted BW)  Estimated Energy Needs: 7852-0052 kcals (25-30 Kcal/Kg)  Justification: repletion and obese  Estimated Protein Needs: 109-136 grams protein (1.2-1.5 g pro/Kg)  Justification: Repletion, hypercatabolism with acute illness, obesity guidelines  and preservation of lean body mass (currently on renal diet however no biochemical data, diagnosis leading to need for reduction in protein intake)  Estimated Fluid Needs: 1394-0259  mL (1 mL/Kcal)  Justification: maintenance    MALNUTRITION:  % Weight Loss:  None noted  % Intake:  </= 50% for >/= 5 days (severe malnutrition)  Subcutaneous Fat Loss:  None observed  Muscle Loss:  None observed  Fluid Retention:  None noted    Malnutrition Diagnosis: Patient does not meet two of the above criteria necessary for diagnosing malnutrition      NUTRITION DIAGNOSIS:  Inadequate oral intake related to acute illness as evidenced by pt report of poor appetite, <50% of intake since 7/3 PTA, slow ability to increase intake in past 24 hours      NUTRITION INTERVENTIONS  Recommendations / Nutrition Prescription  -continue to monitor and encourage adequate oral intake      Implementation  Nutrition education: No education needs assessed at this time      Nutrition Goals  -pt to consume >75% of meals TID    MONITORING AND EVALUATION:  Progress towards goals will be monitored and evaluated per protocol and Practice Guidelines, Food intake, Fluid/beverage intake, Weight and Biochemical data        Kira Reese MBA, RD LD  Clinical Dietitian  [phone]

## 2020-07-09 NOTE — PLAN OF CARE
S-(situation): shift note    B-(background): Pneumonia and AFib RVR    A-(assessment): Remains in afib with heart rate 100-110 on cardizem gtt 15ml/hr.  Off O2 1 L since this morning at 730am with 02 above 92% on RA.  Frequent dry cough, non productive, sending sputum to lab.  Ambulated in room twice this am and up in chair t/o morning= tolerating and denied SOA.    POC to stop IVF, start oral diltizem and titrate of gtt., stop heparin gtt and transition to NEW oral anticoagulation pending insurance coverage.      R-(recommendations): monitor

## 2020-07-09 NOTE — PLAN OF CARE
Pt a/o, denies pain, VSS, HR high 90's, b/p's 120's/80's, productive cough, small white sputum per pt, LS clear, prn tylenol x 2 given this shift for temps 99.7, highest temp 100.1 oral, , 109, 104, continuous IV fluids NS at 125/hr, continuous Cardizem drip at 15mg/hr, continuous heparin drip at 1350 units/hr, on Zithromax IV and Rocephin IV for antibiotics, needed to apply 1 liter nc oxygen around 0200 when pt sleeping, oxygen sats dropped to 88-89% on room air, telemetry a-fib, HR 90's.

## 2020-07-09 NOTE — PLAN OF CARE
S- Transfer to [facility] from [facility].    B- pneumonia afib RVR    A- Brief systems assessment: Heart rate 100-120.  On RA, BP normal.  Ambulated to room, steady gait.  Coumadin Lovenox and oral Cardizem initiated.      R- Transfer to [facility] per physician orders. Continue to monitor pt and update physician as needed.       Code status: Full Code  Skin: intact  Fall Risk: No  Isolation and Signage: Special Precautions -PUI  Medication drips upon transfer: none  Blue Bin checked and medications transfer out with patient===Yes - MEDICATIONS (3 bottle) placed in med surg blue bin

## 2020-07-09 NOTE — PROGRESS NOTES
[facility]    Medicine Progress Note - Hospitalist Service       Date of Admission:  7/7/2020  Assessment & Plan   75-year-old man admitted with new onset atrial fibrillation with rapid ventricular response and subsequently diagnosed with right lower lobe pneumonia and sepsis which probably triggered the atrial fibrillation.  Signs of infection are improving after starting antibiotics yesterday and initial acute kidney injury appears to be subsiding.  Rate of atrial fibrillation has been reasonably well controlled with diltiazem infusion.  Acute venous thromboembolism has not been suspected and there were no signs of intracardiac thrombus on transthoracic echocardiogram.    Principal Problem:    Pneumonia of right lower lobe due to infectious organism  Active Problems:    Atrial fibrillation with RVR (H)    Sepsis (H)    Acute kidney injury (H)    Person under investigation for COVID-19    Diarrhea    Benign prostatic hyperplasia with incomplete bladder emptying    Elevated d-dimer    Switch IV to oral diltiazem today and titrate dose of oral diltiazem upward today as needed, anticipate transition to long-acting oral diltiazem tomorrow morning once dose is more clearly established today  Discontinue IV heparin and start prophylactic dose of Lovenox  Discussed options for prophylactic anticoagulation because of nonvalvular atrial fibrillation including benefits and risks.  After extensive discussion including determination of medication cost to the patient for Xarelto versus warfarin after checking with his preferred pharmacy, patient has expressed preference to start warfarin.  Continue IV ceftriaxone, switch IV to oral Zithromax  Discontinue IV fluids  Advance activity as tolerated and transfer to the floor on telemetry  Wean oxygen supplementation as tolerated       Diet: Renal Diet (non-dialysis)    DVT Prophylaxis: Enoxaparin (Lovenox) SQ  Ash Catheter: not present  Code Status:  Full Code           Disposition Plan   Expected discharge: 2 days, recommended to prior living arrangement once antibiotic plan established and Atrial fibrillation adequately controlled.  Entered: Dennis Rodriguez MD 07/09/2020, 10:57 AM       The patient's care was discussed with the Bedside Nurse and Patient.    Dennis Rodriguez MD  Hospitalist Service  [facility]    ______________________________________________________________________    Interval History   He is feeling better today.  Cough is starting to generate some phlegm.  He is less short of breath.  He did have fever as high as 102.4 yesterday but has been afebrile now for over 12 hours.  Heart rate is fluctuated from normal up as high as 150 very briefly overnight, blood pressure remains normal, he continues to be tachypneic, oxygenation has improved and he has weaned off of oxygen supplementation after waking this morning.  He is tolerating good oral intake.  He is voiding spontaneously.    Data reviewed today: I reviewed all medications, new labs and imaging results over the last 24 hours. I personally reviewed cardiac monitor strips demonstrating atrial fibrillation.    Physical Exam   Vital Signs: Temp: 99.3  F (37.4  C) Temp src: Oral BP: 132/82 Pulse: 96 Heart Rate: 108 Resp: 24 SpO2: 94 % O2 Device: Nasal cannula Oxygen Delivery: 1 LPM  Weight: 238 lbs 12.8 oz  General Appearance: No acute distress sitting in a chair  Respiratory: Tachypneic with otherwise normal respiratory effort, few inspiratory crackles at the right lung base, no wheezes  Cardiovascular: Irregularly irregular heart rate and rhythm, good radial pulse, brisk capillary refill     Data   Recent Labs   Lab 07/09/20  0650 07/08/20  0654 07/07/20  1800 07/07/20  1747 07/07/20  1429 07/07/20  1224   WBC  --  9.0 10.2  --   --  11.8*   HGB  --  14.9 15.2  --   --  16.0   MCV  --  92 91  --   --  90   PLT  --  157 162  --   --  168    137  --  136   --  132*   POTASSIUM 3.7 4.2  --  3.8  --  3.8   CHLORIDE 108 108  --  107  --  101   CO2 22 24  --  23  --  21   BUN 14 22  --  25  --  24   CR 0.99 1.17  --  1.37*  --  1.54*   ANIONGAP 7 5  --  6  --  10   MELIDA 7.6* 7.8*  --  8.0*  --  8.5   * 115*  --  180*  --  142*   ALBUMIN  --   --   --   --   --  3.0*   PROTTOTAL  --   --   --   --   --  7.3   BILITOTAL  --   --   --   --   --  1.3   ALKPHOS  --   --   --   --   --  74   ALT  --   --   --   --   --  43   AST  --   --   --   --   --  44   TROPI  --   --   --  0.095* 0.108* 0.051*     Blood sugars ranged 102-140  Factor Xa level this morning was 0.18 which was in the therapeutic range  Lactic acid this morning 1.0  Blood cultures are negative so far, urine testing for pneumococcal antigen was negative, urine culture is pending, enteric pathogen testing was negative    Recent Results (from the past 24 hour(s))   CT Chest Pulmonary Embolism w Contrast    Narrative    CT CHEST PULMONARY EMBOLISM WITH CONTRAST   7/8/2020 1:48 PM     HISTORY: Shortness of breath, dry cough, fever, elevated D-dimer,  possible right renal mass on ultrasound, PUI for COVID-19.    TECHNIQUE: CT of the chest was performed following the administration  of 100 mL, Isovue 370. Radiation dose for this scan was reduced using  automated exposure control, adjustment of the mA and/or kV according  to patient size, or iterative reconstruction technique.    COMPARISON: None.    FINDINGS: There is no evidence for a pulmonary embolism.    There is patchy and confluent airspace opacity in the right lower  lobe. There is a tiny right pleural effusion. Mild atelectatic changes  in the left lower lobe.    Mildly prominent mediastinal and right hilar lymph nodes.    The abdomen is included on this exam. The solid organs in the abdomen  are not well opacified but appear grossly unremarkable. There are  parapelvic cysts in the left kidney. The visualized bowel appears  grossly unremarkable.       Impression    IMPRESSION: Right lower lobe pneumonia. Tiny right pleural effusion.    CARINA ORTIZ MD   CT Abdomen w/o & w Contrast    Narrative    CT ABDOMEN WITHOUT AND WITH CONTRAST  7/8/2020 1:50 PM    HISTORY:  Possible solid right renal mass on ultrasound; please  evaluate.    TECHNIQUE: Scans obtained from the diaphragm through the pelvis  without and with IV contrast, 100 mL Isovue 370.  Radiation dose for  this scan was reduced using automated exposure control, adjustment of  the mA and/or kV according to patient size, or iterative  reconstruction technique.    COMPARISON:  Abdominal ultrasound dated 7/7/2020, CT chest dated  7/8/2020.    FINDINGS: Right lower lung lobe pneumonia is better described in CT  chest report from same day. Mild atelectasis seen in the posterior  left lung base. Visualized portions of the lung bases and mediastinal  contents are otherwise grossly unremarkable.    There are degenerative changes in the spine. Subtle lucent area in the  left ilium (image 67 series 2) likely represents artifact but could  represent a very subtle lucent lesion. No other evidence for  aggressive osseous lesion or acute osseous fracture is seen.     Low-attenuation subcentimeter renal lesion(s). These are compatible  with small benign cysts and no specific imaging evaluation or  follow-up is recommended. Bilateral parapelvic renal cysts are noted,  worse on the left. Minimal scarring in the anterior right kidney is  seen. No right renal solid mass is identified. The prominent hump in  the right kidney on the prior ultrasound was likely secondary to  normal renal parenchyma with mild adjacent scarring. No  hydronephrosis, nephrolithiasis, hydroureter or ureteral calculus is  identified in the visualized portions of the urinary collecting  system. No adenopathy, free fluid or free air is seen in the abdomen.  Pelvis was not included and cannot be evaluated. There is  nonaneurysmal  atherosclerosis.    There is a mildly nonspecific bowel gas pattern with mildly prominent  small bowel loops measuring up to 2.4 cm in diameter. This could  represent mild ileus. Visualized bowel is otherwise unremarkable.      Impression    IMPRESSION:.  1. Low-attenuation subcentimeter renal lesion(s). These are compatible  with small benign cysts and no specific imaging evaluation or  follow-up is recommended.  2. No solid renal mass is identified. The finding on the ultrasound is  likely due to normal renal parenchyma with adjacent mild scarring.  3. Bilateral parapelvic renal cysts, slightly greater on the left.  4. Right lower lung lobe pneumonia is better described on CT chest  report from same day.  5. Question mild small bowel ileus.    MILDRED COATS MD     Medications     diltiazem (CARDIZEM) infusion ADULT 15 mg/hr (07/08/20 1136)     HEParin 1,350 Units/hr (07/09/20 0741)     sodium chloride 125 mL/hr at 07/09/20 0741       azithromycin  250 mg Intravenous Q24H     cefTRIAXone  2 g Intravenous Q24H     finasteride  5 mg Oral Daily     tamsulosin  0.8 mg Oral Daily

## 2020-07-09 NOTE — PHARMACY-ANTICOAGULATION SERVICE
Clinical Pharmacy - Warfarin Dosing Consult     Pharmacy has been consulted to manage this patient s warfarin therapy.  Indication: Atrial Fibrillation  Therapy Goal: INR 2-3  Warfarin Prior to Admission: No  Recent documented change in oral intake/nutrition: Unknown    No results found for: INR, [id number], F2    Recommend warfarin 5 mg today.  Pharmacy will monitor Jimi Moreno daily and order warfarin doses to achieve specified goal.      Please contact pharmacy as soon as possible if the warfarin needs to be held for a procedure or if the warfarin goals change.

## 2020-07-10 LAB
ANION GAP SERPL CALCULATED.3IONS-SCNC: 7 MMOL/L (ref 3–14)
BUN SERPL-MCNC: 12 MG/DL (ref 7–30)
CALCIUM SERPL-MCNC: 7.8 MG/DL (ref 8.5–10.1)
CHLORIDE SERPL-SCNC: 107 MMOL/L (ref 94–109)
CO2 SERPL-SCNC: 23 MMOL/L (ref 20–32)
CREAT SERPL-MCNC: 0.98 MG/DL (ref 0.66–1.25)
GFR SERPL CREATININE-BSD FRML MDRD: 74 ML/MIN/{1.73_M2}
GLUCOSE SERPL-MCNC: 98 MG/DL (ref 70–99)
GRAM STN SPEC: NORMAL
INR PPP: 1.22 (ref 0.86–1.14)
LACTATE BLD-SCNC: 1.3 MMOL/L (ref 0.7–2)
Lab: NORMAL
PLATELET # BLD AUTO: 192 10E9/L (ref 150–450)
POTASSIUM SERPL-SCNC: 3.5 MMOL/L (ref 3.4–5.3)
SARS-COV-2 PCR COMMENT: NORMAL
SARS-COV-2 RNA SPEC QL NAA+PROBE: NEGATIVE
SARS-COV-2 RNA SPEC QL NAA+PROBE: NORMAL
SODIUM SERPL-SCNC: 137 MMOL/L (ref 133–144)
SPECIMEN SOURCE: NORMAL
TSH SERPL DL<=0.005 MIU/L-ACNC: 2.2 MU/L (ref 0.4–4)

## 2020-07-10 PROCEDURE — 87070 CULTURE OTHR SPECIMN AEROBIC: CPT | Performed by: PEDIATRICS

## 2020-07-10 PROCEDURE — 87205 SMEAR GRAM STAIN: CPT | Performed by: PEDIATRICS

## 2020-07-10 PROCEDURE — 25000128 H RX IP 250 OP 636: Performed by: PEDIATRICS

## 2020-07-10 PROCEDURE — 80048 BASIC METABOLIC PNL TOTAL CA: CPT | Performed by: PEDIATRICS

## 2020-07-10 PROCEDURE — 36415 COLL VENOUS BLD VENIPUNCTURE: CPT | Performed by: PEDIATRICS

## 2020-07-10 PROCEDURE — 25000132 ZZH RX MED GY IP 250 OP 250 PS 637: Performed by: PEDIATRICS

## 2020-07-10 PROCEDURE — 82565 ASSAY OF CREATININE: CPT | Performed by: PEDIATRICS

## 2020-07-10 PROCEDURE — 84443 ASSAY THYROID STIM HORMONE: CPT | Performed by: PEDIATRICS

## 2020-07-10 PROCEDURE — 85610 PROTHROMBIN TIME: CPT | Performed by: PEDIATRICS

## 2020-07-10 PROCEDURE — 84145 PROCALCITONIN (PCT): CPT | Performed by: PEDIATRICS

## 2020-07-10 PROCEDURE — U0003 INFECTIOUS AGENT DETECTION BY NUCLEIC ACID (DNA OR RNA); SEVERE ACUTE RESPIRATORY SYNDROME CORONAVIRUS 2 (SARS-COV-2) (CORONAVIRUS DISEASE [COVID-19]), AMPLIFIED PROBE TECHNIQUE, MAKING USE OF HIGH THROUGHPUT TECHNOLOGIES AS DESCRIBED BY CMS-2020-01-R: HCPCS | Performed by: PEDIATRICS

## 2020-07-10 PROCEDURE — 83605 ASSAY OF LACTIC ACID: CPT | Performed by: PEDIATRICS

## 2020-07-10 PROCEDURE — 12000000 ZZH R&B MED SURG/OB

## 2020-07-10 PROCEDURE — 99232 SBSQ HOSP IP/OBS MODERATE 35: CPT | Performed by: PEDIATRICS

## 2020-07-10 PROCEDURE — 85049 AUTOMATED PLATELET COUNT: CPT | Performed by: PEDIATRICS

## 2020-07-10 RX ORDER — DILTIAZEM HYDROCHLORIDE 360 MG/1
360 CAPSULE, EXTENDED RELEASE ORAL DAILY
Qty: 30 CAPSULE | Refills: 0 | Status: SHIPPED | OUTPATIENT
Start: 2020-07-11 | End: 2020-08-06

## 2020-07-10 RX ORDER — DILTIAZEM HYDROCHLORIDE 180 MG/1
360 CAPSULE, COATED, EXTENDED RELEASE ORAL DAILY
Status: DISCONTINUED | OUTPATIENT
Start: 2020-07-11 | End: 2020-07-11 | Stop reason: HOSPADM

## 2020-07-10 RX ORDER — AZITHROMYCIN 250 MG/1
250 TABLET, FILM COATED ORAL DAILY
Qty: 2 TABLET | Refills: 0 | Status: SHIPPED | OUTPATIENT
Start: 2020-07-12 | End: 2020-08-11

## 2020-07-10 RX ORDER — WARFARIN SODIUM 5 MG/1
5 TABLET ORAL
Status: COMPLETED | OUTPATIENT
Start: 2020-07-10 | End: 2020-07-10

## 2020-07-10 RX ORDER — DILTIAZEM HYDROCHLORIDE 180 MG/1
360 CAPSULE, COATED, EXTENDED RELEASE ORAL ONCE
Status: COMPLETED | OUTPATIENT
Start: 2020-07-10 | End: 2020-07-10

## 2020-07-10 RX ADMIN — ENOXAPARIN SODIUM 40 MG: 40 INJECTION SUBCUTANEOUS at 09:33

## 2020-07-10 RX ADMIN — FINASTERIDE 5 MG: 5 TABLET, FILM COATED ORAL at 20:44

## 2020-07-10 RX ADMIN — DILTIAZEM HYDROCHLORIDE 360 MG: 180 CAPSULE, EXTENDED RELEASE ORAL at 06:55

## 2020-07-10 RX ADMIN — TAMSULOSIN HYDROCHLORIDE 0.8 MG: 0.4 CAPSULE ORAL at 20:44

## 2020-07-10 RX ADMIN — AZITHROMYCIN 250 MG: 250 TABLET, FILM COATED ORAL at 09:33

## 2020-07-10 RX ADMIN — POTASSIUM CHLORIDE 20 MEQ: 1500 TABLET, EXTENDED RELEASE ORAL at 09:33

## 2020-07-10 RX ADMIN — CEFTRIAXONE SODIUM 2 G: 2 INJECTION, POWDER, FOR SOLUTION INTRAMUSCULAR; INTRAVENOUS at 20:43

## 2020-07-10 RX ADMIN — WARFARIN SODIUM 5 MG: 5 TABLET ORAL at 18:46

## 2020-07-10 ASSESSMENT — ACTIVITIES OF DAILY LIVING (ADL)
ADLS_ACUITY_SCORE: 13

## 2020-07-10 ASSESSMENT — MIFFLIN-ST. JEOR: SCORE: 1870

## 2020-07-10 NOTE — PROGRESS NOTES
S-(situation): Note    B-(background): Pneumonia    A-(assessment): Please review system assessment and VS.  Note that pt denies pain.  Pt is eating well and up to bathroom vdg.  Pt is independent in his room and states that he is feeling much better.    R-(recommendations): Cont poc as ordered.

## 2020-07-10 NOTE — PROGRESS NOTES
[facility]    Medicine Progress Note - Hospitalist Service       Date of Admission:  7/7/2020  Assessment & Plan   75-year-old man admitted with new onset atrial fibrillation with rapid ventricular response and right lower lobe pneumonia with systemic inflammatory response syndrome.  Initial signs of SIRS including tachycardia may have been partly due to atrial fibrillation, but he has also had fever and leukocytosis along with severely elevated procalcitonin more consistent with sepsis.  He did have exposure to COVID-19 any family member and initially tested negative.  Signs of pneumonia and sepsis are resolving.  Atrial fibrillation persists with reasonable rate control after transition from IV to oral diltiazem in the last day.  Rate has been adequately controlled at rest and with limited activity.  He also has started warfarin for prophylaxis against thromboembolism because of nonvalvular atrial fibrillation.    Principal Problem:    Pneumonia of right lower lobe due to infectious organism  Active Problems:    Atrial fibrillation with RVR (H)    Sepsis (H)    Acute kidney injury (H)    Person under investigation for COVID-19    Diarrhea    Benign prostatic hyperplasia with incomplete bladder emptying    Elevated d-dimer    Continue oral Zithromax with IV ceftriaxone today, anticipate transition to an oral cephalosporin at discharge  Start long-acting diltiazem  mg daily today and continue to monitor on telemetry for another 24 hours for adequacy of rate control  Recommended outpatient follow-up with cardiology to discuss cardioversion for atrial fibrillation if it persists along with exercise intolerance as he recovers from pneumonia  Recheck testing for COVID-19 today  Pharmacy assisting with warfarin dosing       Diet: Regular Diet Adult    DVT Prophylaxis: Enoxaparin (Lovenox) SQ and Warfarin  Ash Catheter: not present  Code Status: Full Code    Rule Out COVID-19  Handoff:  Jiim is a LOW SUSPICION PUI.  Follow these instructions:    If COVID test positive -> continue isolation precautions    If COVID test negative -> discontinue COVID-specific isolation precautions       Disposition Plan   Expected discharge: Tomorrow, recommended to prior living arrangement once Rate of atrial fibrillation is adequately controlled.  Entered: Dennis Rodriguez MD 07/10/2020, 9:30 AM       The patient's care was discussed with the Bedside Nurse and Patient.    Dennis Rodriguez MD  Hospitalist Service  [facility]    ______________________________________________________________________    Interval History   He generally continues to feel better overall.  However, he does note that he is tired and somewhat winded with limited activity which is a change from his normal baseline.  He remains afebrile.  Heart rate at rest has generally been 90 to less than 120 and this morning increased to 150 briefly but recovered quickly without specific intervention.  Heart rate climbed to 130-140 with activity today.  He denies any palpitations or dizziness.  He denies any chest pain.  Cough continues and he is starting to produce more phlegm.  He saw some small streaks of blood mixed in with his mucus today.  Oxygenation has been stable without need for oxygen supplementation.  Urine output has been adequate.    Data reviewed today: I reviewed all medications, new labs and imaging results over the last 24 hours. I personally reviewed no images or EKG's today.    Physical Exam   Vital Signs: Temp: 97.9  F (36.6  C) Temp src: Oral BP: 134/68 Pulse: 99 Heart Rate: 107 Resp: 20 SpO2: 92 % O2 Device: None (Room air)    Weight: 238 lbs 12.8 oz   Vitals:    07/07/20 1145 07/07/20 1709 07/09/20 0541 07/10/20 0320   Weight: 104 kg (229 lb 4.8 oz) 103 kg (227 lb) 108.3 kg (238 lb 12.8 oz) 108.3 kg (238 lb 12.8 oz)     General Appearance: No acute distress sitting at the edge of his bed,  easily speaks full sentences  Respiratory: Normal respiratory effort, inspiratory crackles present at the right lung base, no wheezing  Cardiovascular: Irregularly irregular heart rate and rhythm, borderline tachycardic, good radial pulse, brisk capillary refill, no significant peripheral edema     Data   Recent Labs   Lab 07/10/20  0708 07/09/20  0650 07/08/20  0654 07/07/20  1800 07/07/20  1747 07/07/20  1429 07/07/20  1224   WBC  --   --  9.0 10.2  --   --  11.8*   HGB  --   --  14.9 15.2  --   --  16.0   MCV  --   --  92 91  --   --  90     --  157 162  --   --  168   INR 1.22*  --   --   --   --   --   --     137 137  --  136  --  132*   POTASSIUM 3.5 3.7 4.2  --  3.8  --  3.8   CHLORIDE 107 108 108  --  107  --  101   CO2 23 22 24  --  23  --  21   BUN 12 14 22  --  25  --  24   CR 0.98 0.99 1.17  --  1.37*  --  1.54*   ANIONGAP 7 7 5  --  6  --  10   MELIDA 7.8* 7.6* 7.8*  --  8.0*  --  8.5   GLC 98 102* 115*  --  180*  --  142*   ALBUMIN  --   --   --   --   --   --  3.0*   PROTTOTAL  --   --   --   --   --   --  7.3   BILITOTAL  --   --   --   --   --   --  1.3   ALKPHOS  --   --   --   --   --   --  74   ALT  --   --   --   --   --   --  43   AST  --   --   --   --   --   --  44   TROPI  --   --   --   --  0.095* 0.108* 0.051*     Procalcitonin yesterday was 2.49, decreased from 4.19 initially  Blood cultures remain negative to date    Medications     Warfarin Therapy Reminder         azithromycin  250 mg Oral Daily     cefTRIAXone  2 g Intravenous Q24H     [START ON 7/11/2020] diltiazem ER COATED BEADS  360 mg Oral Daily     enoxaparin ANTICOAGULANT  40 mg Subcutaneous Daily     finasteride  5 mg Oral Daily     tamsulosin  0.8 mg Oral Daily

## 2020-07-10 NOTE — PHARMACY-ANTICOAGULATION SERVICE
Clinical Pharmacy - Warfarin Dosing Consult     Pharmacy has been consulted to manage this patient s warfarin therapy.  Indication: Atrial Fibrillation  Therapy Goal: INR 2-3  Warfarin Prior to Admission: No  Recent documented change in oral intake/nutrition: Unknown    INR   Date Value Ref Range Status   07/10/2020 1.22 (H) 0.86 - 1.14 Final       Recommend warfarin 5 mg today.  Pharmacy will monitor Jimi Moreno daily and order warfarin doses to achieve specified goal.      Please contact pharmacy as soon as possible if the warfarin needs to be held for a procedure or if the warfarin goals change.      Emy Martínez RP on 7/10/2020 at 9:38 AM

## 2020-07-10 NOTE — PROGRESS NOTES
Telemetry showing HR a-fib 140's, charge nurse Chitra aware, will let Dr. Greene know, will give am Cardizem dose early.

## 2020-07-10 NOTE — PLAN OF CARE
Pt alert/orientated, SL, denies pain, frequent productive cough, moderate sputum red streaked, LS clear, pt states he has shortness of breath with activity, oxygen sats above 92%, VSS, afebrile, no fevers, oral temps 98.4, HR irregular, low 100's, no telemetry ordered, oral diltiazem, pt denies pain, steady on feet, independent in room, had shower at hs.

## 2020-07-11 VITALS
HEIGHT: 74 IN | DIASTOLIC BLOOD PRESSURE: 83 MMHG | BODY MASS INDEX: 30.26 KG/M2 | WEIGHT: 235.8 LBS | OXYGEN SATURATION: 94 % | TEMPERATURE: 98.4 F | RESPIRATION RATE: 18 BRPM | HEART RATE: 100 BPM | SYSTOLIC BLOOD PRESSURE: 127 MMHG

## 2020-07-11 LAB
INR PPP: 1.29 (ref 0.86–1.14)
POTASSIUM SERPL-SCNC: 3.6 MMOL/L (ref 3.4–5.3)
PROCALCITONIN SERPL-MCNC: 1.4 NG/ML

## 2020-07-11 PROCEDURE — 25000132 ZZH RX MED GY IP 250 OP 250 PS 637: Performed by: PEDIATRICS

## 2020-07-11 PROCEDURE — 36415 COLL VENOUS BLD VENIPUNCTURE: CPT | Performed by: PEDIATRICS

## 2020-07-11 PROCEDURE — 99239 HOSP IP/OBS DSCHRG MGMT >30: CPT | Performed by: PEDIATRICS

## 2020-07-11 PROCEDURE — 25000128 H RX IP 250 OP 636: Performed by: PEDIATRICS

## 2020-07-11 PROCEDURE — 84132 ASSAY OF SERUM POTASSIUM: CPT | Performed by: PEDIATRICS

## 2020-07-11 PROCEDURE — 85610 PROTHROMBIN TIME: CPT | Performed by: PEDIATRICS

## 2020-07-11 RX ORDER — CEFDINIR 300 MG/1
600 CAPSULE ORAL DAILY
Qty: 14 CAPSULE | Refills: 0 | Status: SHIPPED | OUTPATIENT
Start: 2020-07-11 | End: 2020-08-11

## 2020-07-11 RX ORDER — WARFARIN SODIUM 5 MG/1
5 TABLET ORAL DAILY
Qty: 30 TABLET | Refills: 0 | Status: SHIPPED | OUTPATIENT
Start: 2020-07-11 | End: 2020-08-06

## 2020-07-11 RX ADMIN — AZITHROMYCIN 250 MG: 250 TABLET, FILM COATED ORAL at 08:32

## 2020-07-11 RX ADMIN — DILTIAZEM HYDROCHLORIDE 360 MG: 180 CAPSULE, EXTENDED RELEASE ORAL at 08:32

## 2020-07-11 RX ADMIN — ENOXAPARIN SODIUM 40 MG: 40 INJECTION SUBCUTANEOUS at 08:32

## 2020-07-11 ASSESSMENT — ACTIVITIES OF DAILY LIVING (ADL)
ADLS_ACUITY_SCORE: 13

## 2020-07-11 ASSESSMENT — MIFFLIN-ST. JEOR: SCORE: 1856.39

## 2020-07-11 NOTE — DISCHARGE SUMMARY
[facility]  Hospitalist Discharge Summary      Date of Admission:  7/7/2020  Date of Discharge:  7/11/2020  Discharging Provider: Dennis Rodriguez MD      Discharge Diagnoses   Principal Problem:    Pneumonia of right lower lobe due to infectious organism  Active Problems:    Atrial fibrillation with RVR (H)    Sepsis (H)    Acute kidney injury (H)    Person under investigation for COVID-19    Diarrhea    Benign prostatic hyperplasia with incomplete bladder emptying    Elevated d-dimer    Follow-ups Needed After Discharge   Follow-up Appointments     Follow-up and recommended labs and tests       Follow up with primary care provider within 7 days to evaluate medication   change and for hospital follow- up.  The following labs/tests are   recommended: INR within 2-3 days.    Follow up with Cardiology at [facility] within 2 weeks  to evaluate   medication change and regarding new diagnosis.             Unresulted Labs Ordered in the Past 30 Days of this Admission     Date and Time Order Name Status Description    7/9/2020 1844 Sputum Culture Aerobic Bacterial In process     7/8/2020 1311 Blood culture Preliminary     7/8/2020 1311 Blood culture Preliminary     7/7/2020 1729 Blood culture Preliminary     7/7/2020 1729 Blood culture Preliminary     7/7/2020 1140 Blood culture Preliminary     7/7/2020 1140 Blood culture Preliminary       These results will be followed up by PCP    Discharge Disposition   Discharged to home  Condition at discharge: Stable      Hospital Course   75-year-old generally healthy man presented with several days of cough and shortness of breath and recent exposure to COVID-19 in his grandson.  Upon initial evaluation in the emergency room, he was found to have atrial fibrillation with rapid ventricular response and was therefore admitted.    Problem #1 right lower lobe pneumonia with possible sepsis.  Clinical presentation was consistent with pneumonia and  systemic inflammatory response syndrome.  Radiographic findings were consistent with right lower lobe infiltrate.  Blood cultures were negative during hospitalization.  Urine testing for pneumococcal antigen was negative.  Sputum culture is pending at the time of discharge.  He had two tests for COVID-19  by 72 hours that were negative.  Procalcitonin was severely elevated although lactic acid was normal.  Renal function was worsened compared with previous baseline consistent with acute kidney injury.  He was treated with ceftriaxone and Zithromax and improved clinically with decreasing procalcitonin.  After investigation, community-acquired pneumonia was suspected.    Problem #2 new onset nonvalvular atrial fibrillation with rapid ventricular response.  EKG demonstrated rapid atrial fibrillation.  Troponin increased as high as 0.10 and then decreased.  BNP was borderline elevated but radiographic findings were not suspicious for heart failure.  D-dimer was elevated, but no pulmonary emboli were detected radiographically.  Transthoracic echocardiogram demonstrated biatrial enlargement without significant valvular disease, intracardiac thrombus, or ventricular dysfunction.  He was admitted to the ICU initially for continuous diltiazem infusion and was transiently treated with heparin infusion.  After transition to oral diltiazem, he maintained resting heart rate  and heart rate with activity increased into the 130s.  Aside from some lingering exertional dyspnea and fatigue, he was asymptomatic from atrial fibrillation.  Risk score for thromboembolism from atrial fibrillation was intermediate, so anticoagulation was advised.  After discussion of various options for prophylactic anticoagulation, his preference was to start warfarin which was started during hospitalization.  Close outpatient follow-up of INR with his PCP was recommended after discharge.  He was referred to cardiology for evaluation in the  next 2 weeks or so to discuss other treatment options if atrial fibrillation persists with ongoing symptoms of exertional fatigue or dyspnea as he recovers from pneumonia.  Chronic lisinopril was discontinued after starting diltiazem because of normal blood pressures on diltiazem.    Problem #3 acute kidney injury.  Fractional excretion of sodium was low.  Renal ultrasound was abnormal demonstrating incomplete bladder emptying with postvoid residual at 38% and possible right renal mass.  Subsequent abdomen CT with contrast excluded right renal mass.  As other medical problems were treated, renal function recovered to baseline by discharge.  He was able to void well spontaneously while continuing his chronic medication treatments for BPH.  Elevated postvoid residual was attributed to BPH with incomplete bladder emptying.  After investigation, acute kidney injury probably due to sepsis was suspected although rapid atrial fibrillation may have contributed.    Problem #4 diarrhea.  He presented with symptoms of diarrhea.  Those symptoms resolved during his hospitalization.  Stool testing for enteric pathogens was negative.    Consultations This Hospital Stay   PHARMACY TO DOSE WARFARIN    Code Status   Full Code    Time Spent on this Encounter   I, Dennis Rodriguez MD, personally saw the patient today and spent greater than 30 minutes discharging this patient.       Dennis Rodriguez MD  [facility]  ______________________________________________________________________    Physical Exam   Vital Signs: Temp: 98.4  F (36.9  C) Temp src: Oral BP: 127/83 Pulse: 100 Heart Rate: 101 Resp: 18 SpO2: 94 % O2 Device: None (Room air)    Weight: 235 lbs 12.8 oz  General Appearance: No distress  Cardiovascular: Irregularly irregular heart rate and rhythm       Primary Care Physician   [facility]    Discharge Orders      CARDIOLOGY EVAL ADULT REFERRAL      Reason for your hospital  stay    Hospitalized due to pneumonia and rapid atrial fibrillation and improved     Activity    Your activity upon discharge: activity as tolerated     Follow-up and recommended labs and tests     Follow up with primary care provider within 7 days to evaluate medication change and for hospital follow- up.  The following labs/tests are recommended: INR within 2-3 days.    Follow up with Cardiology at [facility] within 2 weeks  to evaluate medication change and regarding new diagnosis.     Full Code     Diet    Follow this diet upon discharge: Orders Placed This Encounter      Regular Diet Adult       Significant Results and Procedures   Most Recent 3 CBC's:  Recent Labs   Lab Test 07/10/20  0708 07/08/20  0654 07/07/20  1800 07/07/20  1224   WBC  --  9.0 10.2 11.8*   HGB  --  14.9 15.2 16.0   MCV  --  92 91 90    157 162 168     Most Recent 3 BMP's:  Recent Labs   Lab Test 07/11/20  0639 07/10/20  0708 07/09/20  0650 07/08/20  0654   NA  --  137 137 137   POTASSIUM 3.6 3.5 3.7 4.2   CHLORIDE  --  107 108 108   CO2  --  23 22 24   BUN  --  12 14 22   CR  --  0.98 0.99 1.17   ANIONGAP  --  7 7 5   MELIDA  --  7.8* 7.6* 7.8*   GLC  --  98 102* 115*     Most Recent 2 LFT's:  Recent Labs   Lab Test 07/07/20  1224 04/17/14  1450   AST 44 27   ALT 43 39   ALKPHOS 74 92   BILITOTAL 1.3 0.9     Most Recent INR's and Anticoagulation Dosing History:  Anticoagulation Dose History     Recent Dosing and Labs Latest Ref Rng & Units 7/9/2020 7/10/2020 7/11/2020    Warfarin 5 mg - 5 mg 5 mg -    INR 0.86 - 1.14 - 1.22(H) 1.29(H)        Most Recent 3 Troponin's:  Recent Labs   Lab Test 07/07/20  1747 07/07/20  1429 07/07/20  1224   TROPI 0.095* 0.108* 0.051*     Most Recent 3 BNP's:  Recent Labs   Lab Test 07/07/20  1224   NTBNPI 1,892*     Most Recent D-dimer:  Recent Labs   Lab Test 07/07/20  1747   DD 2.0*     Most Recent 6 Bacteria Isolates From Any Culture (See EPIC Reports for Culture Details):  Recent Labs   Lab Test  07/10/20  1715 07/09/20  1145 07/08/20  1407 07/08/20  1405 07/08/20  1145 07/07/20  1746   CULT Light growth  Normal franci to date    Culture in progress Canceled, Test credited  >10 Squamous epithelial cells/low power field indicates oral contamination. Please   recollect.  *  Notification of test cancellation was given to  Amina Patel RN [id number] 7/9/20 AM   No growth after 2 days No growth after 2 days <10,000 colonies/mL  urogenital franci  Susceptibility testing not routinely done   No growth after 2 days     Most Recent TSH and T4:  Recent Labs   Lab Test 07/10/20  0708   TSH 2.20     Most Recent Urinalysis:  Recent Labs   Lab Test 07/07/20  1311   COLOR Kelsea   APPEARANCE Slightly Cloudy   URINEGLC Negative   URINEBILI Negative   URINEKETONE 5*   SG 1.030   UBLD Moderate*   URINEPH 5.0   PROTEIN >499*   NITRITE Negative   LEUKEST Negative   RBCU 2   WBCU None   ,   Results for orders placed or performed during the hospital encounter of 07/07/20   XR Chest Port 1 View    Narrative    XR CHEST PORT 1 VW 7/7/2020 1:08 PM    HISTORY: cough, fever    COMPARISON: None.    FINDINGS: Minimal bibasilar scarring. The lungs are otherwise clear.  No pleural effusion. Normal heart size.      Impression    IMPRESSION:No acute cardiopulmonary process. No pneumonic  consolidation or pleural effusion.    EDWAR LARSON MD   US Renal Complete    Narrative    US RENAL COMPLETE 7/7/2020 6:55 PM    CLINICAL HISTORY: Assess for evidence of obstruction, mass, kidney  size. NO IV contrast  TECHNIQUE: Routine Bilateral Renal and Bladder Ultrasound.    COMPARISON: None.    FINDINGS:    RIGHT KIDNEY: 11.7 cm. Normal cortical echotexture and thickness.  Grossly normal flow by color Doppler. In the lateral right interpolar  region is a 3.8 x 3.1 x 2.6 cm isoechoic possible solid mass. No  hydronephrosis.    LEFT KIDNEY: 12.2 cm. Normal cortical echotexture. Mild thinning.  Grossly normal flow by color Doppler. No hydronephrosis.      BLADDER: Prevoid volume is 288 mL. Postvoid volume is 110 mL. Normal  bilateral ureteral jets identified.      Impression    IMPRESSION:  1.  There is a 3.8 cm possible solid right renal mass. Neoplasm is not  excluded. If the patient cannot receive IV contrast, the please  consider renal MRI for further evaluation.    2.  Left kidney is unremarkable.    3.   Significant bladder post void residual at 38%.    RENNY BARRERA MD   CT Chest Pulmonary Embolism w Contrast    Narrative    CT CHEST PULMONARY EMBOLISM WITH CONTRAST   7/8/2020 1:48 PM     HISTORY: Shortness of breath, dry cough, fever, elevated D-dimer,  possible right renal mass on ultrasound, PUI for COVID-19.    TECHNIQUE: CT of the chest was performed following the administration  of 100 mL, Isovue 370. Radiation dose for this scan was reduced using  automated exposure control, adjustment of the mA and/or kV according  to patient size, or iterative reconstruction technique.    COMPARISON: None.    FINDINGS: There is no evidence for a pulmonary embolism.    There is patchy and confluent airspace opacity in the right lower  lobe. There is a tiny right pleural effusion. Mild atelectatic changes  in the left lower lobe.    Mildly prominent mediastinal and right hilar lymph nodes.    The abdomen is included on this exam. The solid organs in the abdomen  are not well opacified but appear grossly unremarkable. There are  parapelvic cysts in the left kidney. The visualized bowel appears  grossly unremarkable.      Impression    IMPRESSION: Right lower lobe pneumonia. Tiny right pleural effusion.    CARINA ORTIZ MD   CT Abdomen w/o & w Contrast    Narrative    CT ABDOMEN WITHOUT AND WITH CONTRAST  7/8/2020 1:50 PM    HISTORY:  Possible solid right renal mass on ultrasound; please  evaluate.    TECHNIQUE: Scans obtained from the diaphragm through the pelvis  without and with IV contrast, 100 mL Isovue 370.  Radiation dose for  this scan was reduced  using automated exposure control, adjustment of  the mA and/or kV according to patient size, or iterative  reconstruction technique.    COMPARISON:  Abdominal ultrasound dated 7/7/2020, CT chest dated  7/8/2020.    FINDINGS: Right lower lung lobe pneumonia is better described in CT  chest report from same day. Mild atelectasis seen in the posterior  left lung base. Visualized portions of the lung bases and mediastinal  contents are otherwise grossly unremarkable.    There are degenerative changes in the spine. Subtle lucent area in the  left ilium (image 67 series 2) likely represents artifact but could  represent a very subtle lucent lesion. No other evidence for  aggressive osseous lesion or acute osseous fracture is seen.     Low-attenuation subcentimeter renal lesion(s). These are compatible  with small benign cysts and no specific imaging evaluation or  follow-up is recommended. Bilateral parapelvic renal cysts are noted,  worse on the left. Minimal scarring in the anterior right kidney is  seen. No right renal solid mass is identified. The prominent hump in  the right kidney on the prior ultrasound was likely secondary to  normal renal parenchyma with mild adjacent scarring. No  hydronephrosis, nephrolithiasis, hydroureter or ureteral calculus is  identified in the visualized portions of the urinary collecting  system. No adenopathy, free fluid or free air is seen in the abdomen.  Pelvis was not included and cannot be evaluated. There is  nonaneurysmal atherosclerosis.    There is a mildly nonspecific bowel gas pattern with mildly prominent  small bowel loops measuring up to 2.4 cm in diameter. This could  represent mild ileus. Visualized bowel is otherwise unremarkable.      Impression    IMPRESSION:.  1. Low-attenuation subcentimeter renal lesion(s). These are compatible  with small benign cysts and no specific imaging evaluation or  follow-up is recommended.  2. No solid renal mass is identified. The  finding on the ultrasound is  likely due to normal renal parenchyma with adjacent mild scarring.  3. Bilateral parapelvic renal cysts, slightly greater on the left.  4. Right lower lung lobe pneumonia is better described on CT chest  report from same day.  5. Question mild small bowel ileus.    MILDRED COATS MD   Echo Limited    Narrative    [id number]  [id number]  [id number]  [id number]^FLORENCIA^MANUEL           [facility]  Echocardiography Laboratory  [address] [place] [address]        Name: CANDELARIO BARAJAS  MRN: [id number]  : 1945  Study Date: 2020 08:08 AM  Age: 75 yrs  Gender: Male  Patient Location: [facility]  Reason For Study: SOB  History: A fib with RVR, HTN,  Ordering Physician: MANUEL DON  Performed By: Peace Banegas     BSA: 2.3 m2  Height: 73 in  Weight: 229 lb  HR: 102  BP: 131/82 mmHg  _____________________________________________________________________________  __        Procedure  Limited Portable Echo Adult. Optison (NDC #8794-1305) given intravenously.  _____________________________________________________________________________  __        Interpretation Summary     The rhythm was atrial fibrillation.  Left ventricular systolic function is normal.  The visual ejection fraction is estimated at 60-65%.  The left ventricle is normal in size.  There is mild concentric left ventricular hypertrophy.  The right ventricle is normal in structure, function and size.  There is moderate biatrial enlargement.  Doppler interrogation does not demonstrate significant stenosis or  insufficiency involving cardiac valves.     No old studies for compariosn.  _____________________________________________________________________________  __        Left Ventricle  The left ventricle is normal in size. There is mild concentric left  ventricular hypertrophy. Left ventricular systolic function is normal. The  visual ejection fraction is estimated at 60-65%. No regional wall  motion  abnormalities noted. There is no thrombus seen in the left ventricle.     Right Ventricle  The right ventricle is normal in structure, function and size. There is no  mass or thrombus in the right ventricle.     Atria  There is moderate biatrial enlargement. There is no atrial shunt seen. The  left atrial appendage is not well visualized.     Mitral Valve  The mitral valve leaflets appear normal. There is no evidence of stenosis,  fluttering, or prolapse. There is no mitral regurgitation noted. There is no  mitral valve stenosis.        Tricuspid Valve  Normal tricuspid valve. The right ventricular systolic pressure is  approximated at 16.8 mmHg plus the right atrial pressure. Right ventricle  systolic pressure estimate normal. There is mild (1+) tricuspid regurgitation.  There is no tricuspid stenosis.     Aortic Valve  The aortic valve is trileaflet. No aortic regurgitation is present. No aortic  stenosis is present.     Pulmonic Valve  Normal pulmonic valve. There is no pulmonic valvular regurgitation. There is  no pulmonic valvular stenosis.     Vessels  The aortic root is normal size. Normal size ascending aorta. Inferior vena  cava not well visualized for estimation of right atrial pressure. The  pulmonary artery is normal size.     Pericardium  The pericardium appears normal. There is no pleural effusion.        Rhythm  The rhythm was atrial fibrillation.  _____________________________________________________________________________  __  MMode/2D Measurements & Calculations     IVSd: 1.3 cm  LVIDd: 4.6 cm  LVIDs: 3.5 cm  LVPWd: 1.2 cm  FS: 25.2 %  LV mass(C)d: 216.1 grams  LV mass(C)dI: 94.8 grams/m2  Ao root diam: 3.5 cm  LA dimension: 5.0 cm  asc Aorta Diam: 3.8 cm  LA/Ao: 1.4  RWT: 0.52        Doppler Measurements & Calculations  Ao V2 max: 117.6 cm/sec  Ao max P.0 mmHg  TR max ca: 205.0 cm/sec  TR max P.8 mmHg               _____________________________________________________________________________  __        Report approved by: Dr. Low Menendez 07/08/2020 11:38 AM            Discharge Medications   Current Discharge Medication List      START taking these medications    Details   azithromycin (ZITHROMAX) 250 MG tablet Take 1 tablet (250 mg) by mouth daily for 2 days  Qty: 2 tablet, Refills: 0    Associated Diagnoses: Pneumonia of right lower lobe due to infectious organism      cefdinir (OMNICEF) 300 MG capsule Take 2 capsules (600 mg) by mouth daily for 7 days  Qty: 14 capsule, Refills: 0    Associated Diagnoses: Pneumonia of right lower lobe due to infectious organism      diltiazem ER COATED BEADS (CARDIZEM CD) 360 MG 24 hr capsule Take 1 capsule (360 mg) by mouth daily  Qty: 30 capsule, Refills: 0    Associated Diagnoses: Atrial fibrillation with RVR (H)      warfarin ANTICOAGULANT (COUMADIN) 5 MG tablet Take 1 tablet (5 mg) by mouth daily Or as advised by provider  Qty: 30 tablet, Refills: 0    Associated Diagnoses: Atrial fibrillation with RVR (H)         CONTINUE these medications which have NOT CHANGED    Details   finasteride (PROSCAR) 5 MG tablet Take 1 tablet (5 mg) by mouth daily  Qty: 90 tablet, Refills: 3    Associated Diagnoses: Enlarged prostate      tamsulosin (FLOMAX) 0.4 MG capsule Take 2 capsules (0.8 mg) by mouth daily  Qty: 180 capsule, Refills: 3    Associated Diagnoses: Benign prostatic hyperplasia with lower urinary tract symptoms, symptom details unspecified         STOP taking these medications       lisinopril (ZESTRIL) 40 MG tablet Comments:   Reason for Stopping:             Allergies   Allergies   Allergen Reactions     Pollen Extract/Tree Extract Other (See Comments)     Oak pollen

## 2020-07-11 NOTE — PLAN OF CARE
Patient is alert and oriented. VSS. Afebrile. Tele Afib with rates 80s. On lovenox. Frequent productive cough with white yellow creamy thick sputum with red specks. Adequate oxygen saturations on room air. Decline IS during the night. Lungs coarse and diminished. Potassium replaced yesterday with recheck this am. Denies pain.

## 2020-07-11 NOTE — PROGRESS NOTES
S-(situation): Patient discharged to Home via Ambulatory with Family    B-(background): Afib with RVR    A-(assessment): Please review system assessment and VS.  Note that pt denies pain, eating well, vdg and remains in afib.    R-(recommendations): Discharge instructions reviewed with Pt. Listed belongings gathered and returned to patient. Sent with pt.  Coumadin education completed by pharmacy prior to discharge.         Discharge Nursing Criteria:     Care Plan and Patient education resolved: Yes    New Medications- pt has been educated about purpose and side effects: Yes    Vaccines  Influenza status verified at discharge:  Not Applicable        MISC  Prescriptions if needed, hard copies sent with patient  NA  Home and hospital aquired medications returned to patient: Yes  Medication Bin checked and emptied on discharge Yes  Patient reports post-discharge pain management plan is effective: Yes

## 2020-07-11 NOTE — PLAN OF CARE
"BP (!) 145/85 (BP Location: Left arm)   Pulse 100   Temp 98.6  F (37  C) (Oral)   Resp 18   Ht 1.867 m (6' 1.5\")   Wt 108.3 kg (238 lb 12.8 oz)   SpO2 95%   BMI 31.08 kg/m   Patient alert and oriented, ambulating independent in room. Ate well for dinner, drinking fluids well. Informed of negative Covid test #2. Patient is anticipating discharge tomorrow. Denies pain or discomfort, sputum sample sent to lab this afternoon.    "

## 2020-07-12 LAB
BACTERIA SPEC CULT: NORMAL
SPECIMEN SOURCE: NORMAL

## 2020-07-13 LAB
BACTERIA SPEC CULT: NO GROWTH
BACTERIA SPEC CULT: NO GROWTH
SPECIMEN SOURCE: NORMAL
SPECIMEN SOURCE: NORMAL

## 2020-07-14 ENCOUNTER — DOCUMENTATION ONLY (OUTPATIENT)
Dept: LAB | Facility: CLINIC | Age: 75
End: 2020-07-14

## 2020-07-14 ENCOUNTER — TELEPHONE (OUTPATIENT)
Dept: FAMILY MEDICINE | Facility: CLINIC | Age: 75
End: 2020-07-14

## 2020-07-14 DIAGNOSIS — I48.91 ATRIAL FIBRILLATION WITH RVR (H): Primary | ICD-10-CM

## 2020-07-14 DIAGNOSIS — I48.91 ATRIAL FIBRILLATION WITH RVR (H): ICD-10-CM

## 2020-07-14 LAB
BACTERIA SPEC CULT: NO GROWTH
CAPILLARY BLOOD COLLECTION: NORMAL
INR PPP: 1.4 (ref 0.86–1.14)
SPECIMEN SOURCE: NORMAL

## 2020-07-14 PROCEDURE — 36416 COLLJ CAPILLARY BLOOD SPEC: CPT | Performed by: FAMILY MEDICINE

## 2020-07-14 PROCEDURE — 85610 PROTHROMBIN TIME: CPT | Performed by: FAMILY MEDICINE

## 2020-07-14 NOTE — TELEPHONE ENCOUNTER
This patient was discharged from [facility] on 07/10/2020.    Discharge Diagnosis:Atrial Fibrillation With Rvr (H), Pneumonia Of Right Lower Lobe Due To Infectious Organism    Follow-up instructions: Follow up with primary care provider within 7 days to evaluate medication change and for hospital follow- up.  The following labs/tests are recommended: INR within 2-3 days.    Follow up with Cardiology at [facility] within 2 weeks  to evaluate medication change and regarding new diagnosis.    A follow-up visit has been scheduled.  07/20/2020    Please follow-up with patient.

## 2020-07-14 NOTE — PROGRESS NOTES
This patient has recently been hospitalized and now needs continued INR cares.   He has an upcoming appointment with you on 7-20 as a hospital follow up.   Lab needs future orders for INR testing, as Jimi is coming into lab today (7-14) at 1430.     Thanks, Cynthia DEJESUS

## 2020-07-14 NOTE — TELEPHONE ENCOUNTER
Reviewing INR result for this patient.    Patient is a patient of Dr. Talbert.  He has follow up hospital visit next week.    Reviewed hospital discharge summary and he was started on Coumadin for A-Fib with RVR.    Will refer to INR anticoagulation to ask that they follow up with patient on INR of 1.4 from today.    Francia Finnegan, DNP, APRN, CNP

## 2020-07-15 ENCOUNTER — ANTICOAGULATION THERAPY VISIT (OUTPATIENT)
Dept: NURSING | Facility: CLINIC | Age: 75
End: 2020-07-15
Payer: COMMERCIAL

## 2020-07-15 DIAGNOSIS — I48.91 ATRIAL FIBRILLATION WITH RVR (H): ICD-10-CM

## 2020-07-15 PROCEDURE — 99207 ZZC NO CHARGE NURSE ONLY: CPT

## 2020-07-15 NOTE — PROGRESS NOTES
Anticoagulation Management    Unable to reach Jimi today.    Today's INR result of 1.4 is subtherapeutic (goal INR of 2.0-3.0).  Result received from: Clinic Lab    Follow up required to confirm warfarin dose taken and assess for changes This is patient's first INR with [facility]. Just put on Warfarin 6/9 for A fib. Result came in yesterday, need to access if patient too warfarin yesterday    Left message to call [phone]     [facility] calendar updated with plan if patient took dose yesterday    Anticoagulation clinic to follow up    Aby Jung RN

## 2020-07-15 NOTE — PROGRESS NOTES
ANTICOAGULATION INITIAL CLINIC VISIT    Patient Name:  Jimi Moreno  Date:  7/15/2020  Referred by: Dr Finnegan  Contact Type:  Telephone    SUBJECTIVE:  Coumadin education was completed today.  Topics covered include:  -Introduction to coumadin  -Proper Administration  -INR Testing  -Sign/Symptoms of Bleeding  -Signs/Symptoms of Clot Formation or Stroke  -Dietary Intake of Vitamin K  -Drug Interactions  -Anticoagulation Identification (bracelet, necklace or wallet card)  -Future Surgery  -Effects of Alcohol, Tobacco, and Exercise on Coumadin    Coumadin Education Booklet and Coumadin Identification Wallet Card were given to the patient.          OBJECTIVE    Recent labs: (last 7 days)     20  1429   INR 1.40*       ASSESSMENT / PLAN  No question data found.  Anticoagulation Summary  As of 7/15/2020    INR goal:   2.0-3.0   TTR:   --   INR used for dosin.40! (2020)   Warfarin maintenance plan:   5 mg (5 mg x 1) every Sun, Tue, Fri; 7.5 mg (5 mg x 1.5) all other days   Full warfarin instructions:   7/15: 7.5 mg; : 7.5 mg; Otherwise 5 mg every Sun, Tue, Fri; 7.5 mg all other days   Weekly warfarin total:   45 mg   Plan last modified:   Aby Jung RN (7/15/2020)   Next INR check:   2020   Target end date:   Indefinite    Indications    Atrial fibrillation with RVR (H) [I48.91]             Anticoagulation Episode Summary     INR check location:       Preferred lab:       Send INR reminders to:   PAMELA GONSALEZ    Comments:         Anticoagulation Care Providers     Provider Role Specialty Phone number    Francia Finnegan NP Referring Nurse Practitioner - Family [phone]            See the Encounter Report to view Anticoagulation Flowsheet and Dosing Calendar (Go to Encounters tab in chart review, and find the Anticoagulation Therapy Visit)        Aby Jung RN

## 2020-07-15 NOTE — PROGRESS NOTES
Chart reviewed with ACC RN.    Eneida Pacheco, PharmD BCACP  Anticoagulation Clinical Pharmacist

## 2020-07-17 ENCOUNTER — ANTICOAGULATION THERAPY VISIT (OUTPATIENT)
Dept: NURSING | Facility: CLINIC | Age: 75
End: 2020-07-17

## 2020-07-17 DIAGNOSIS — I48.91 ATRIAL FIBRILLATION WITH RVR (H): ICD-10-CM

## 2020-07-17 LAB
CAPILLARY BLOOD COLLECTION: NORMAL
INR PPP: 2 (ref 0.86–1.14)

## 2020-07-17 PROCEDURE — 36416 COLLJ CAPILLARY BLOOD SPEC: CPT | Performed by: FAMILY MEDICINE

## 2020-07-17 PROCEDURE — 85610 PROTHROMBIN TIME: CPT | Performed by: FAMILY MEDICINE

## 2020-07-17 NOTE — PROGRESS NOTES
"Jimi Moreno  Gender: male  : 1945  [address] [facility] [address] [phone] (home)     Medical Record: [id number]  Pharmacy: [facility] [address] [place], [place] - [address] [facility]  Primary Care Provider: No Ref-Primary, Physician    Parent's names are: Data Unavailable (mother) and Data Unavailable (father).      [facility]  2020     Discharge Phone Call:  Key Words/Key Times      Introduction - AIDET (Acknowledge, Introduce, Duration, Explanation)      Empathy-   We are calling to see how you are since your recent stay in the hospital?     Call back COMMENTS: \"Good. Pulse is still up to 90s and low 100s, I do have an appt with a cardiologist in [place]. I ran out of my Warfarin yesterday      Clinical Questions -  (f/u appts, medication side effects/purpose, ability to care for self at home) \"For your safety, it is important to us that you understand the purpose and side effects of your medications, can you tell me what your new medications are?\"     Call back COMMENTS: Warfarin - knew side effects      Staff Recognition -  We like to recognize staff and physicians who have done an excellent job.  Do you remember any people from your care team that you would like recognize?     Call back COMMENTS: \"Jazzy Wilson, Clara, and Cara. Couldn't be any nicer!\"      Very Good Care -  We want to provide very good care to all patients.  How was your care?     Call back COMMENTS: \"Great care, great teamwork, the best attitudes!\"      Opportunities for Improvement -  Our goal is to be the best.  Do you have any suggestions for things that we could improve upon?     Call back COMMENTS: \"Nothing!'      Thank You     First callback attempt 20 @ [phone]: No answer. Brief message left on personalized answering machine.       2020 DONNA Fuentes  "

## 2020-07-17 NOTE — PROGRESS NOTES
ANTICOAGULATION FOLLOW-UP CLINIC VISIT    Patient Name:  Jimi Moreno  Date:  2020  Contact Type:  Telephone  New start up will see him for recheck 2 time a week until inr normal for x2 then weekly while in range x2 then 2very 2 weeks for 90 days.  SUBJECTIVE:  Patient Findings     Comments:     Assessed for S/S bleeding, clotting, medication, diet, health, activity and alcohol changes          Clinical Outcomes     Negatives:   Major bleeding event, Thromboembolic event, Anticoagulation-related hospital admission, Anticoagulation-related ED visit, Anticoagulation-related fatality    Comments:     Assessed for S/S bleeding, clotting, medication, diet, health, activity and alcohol changes             OBJECTIVE    Recent labs: (last 7 days)     20  0824   INR 2.00*       ASSESSMENT / PLAN  INR assessment THER    Recheck INR In: 3 DAYS    INR Location Clinic      Anticoagulation Summary  As of 2020    INR goal:   2.0-3.0   TTR:   --   INR used for dosin.00 (2020)   Warfarin maintenance plan:   5 mg (5 mg x 1) every Sun, Tue, Fri; 7.5 mg (5 mg x 1.5) all other days   Full warfarin instructions:   5 mg every Sun, Tue, Fri; 7.5 mg all other days   Weekly warfarin total:   45 mg   No change documented:   Jyotsna Alcantara RN   Plan last modified:   Aby Jung RN (7/15/2020)   Next INR check:   2020   Target end date:   Indefinite    Indications    Atrial fibrillation with RVR (H) [I48.91]             Anticoagulation Episode Summary     INR check location:       Preferred lab:       Send INR reminders to:   PAMELA GONSALEZ    Comments:         Anticoagulation Care Providers     Provider Role Specialty Phone number    Francia Finnegan, NP Referring Nurse Practitioner - Family [phone]            See the Encounter Report to view Anticoagulation Flowsheet and Dosing Calendar (Go to Encounters tab in chart review, and find the Anticoagulation Therapy Visit)        Jyotsna STARKEY  DONNA Alcantara

## 2020-07-20 ENCOUNTER — ANTICOAGULATION THERAPY VISIT (OUTPATIENT)
Dept: NURSING | Facility: CLINIC | Age: 75
End: 2020-07-20

## 2020-07-20 ENCOUNTER — OFFICE VISIT (OUTPATIENT)
Dept: FAMILY MEDICINE | Facility: CLINIC | Age: 75
End: 2020-07-20
Payer: COMMERCIAL

## 2020-07-20 VITALS
HEART RATE: 89 BPM | DIASTOLIC BLOOD PRESSURE: 80 MMHG | SYSTOLIC BLOOD PRESSURE: 135 MMHG | HEIGHT: 74 IN | RESPIRATION RATE: 20 BRPM | WEIGHT: 230.6 LBS | OXYGEN SATURATION: 97 % | BODY MASS INDEX: 29.59 KG/M2

## 2020-07-20 DIAGNOSIS — J18.9 PNEUMONIA OF RIGHT LOWER LOBE DUE TO INFECTIOUS ORGANISM: ICD-10-CM

## 2020-07-20 DIAGNOSIS — I48.91 ATRIAL FIBRILLATION WITH RVR (H): ICD-10-CM

## 2020-07-20 DIAGNOSIS — Z09 HOSPITAL DISCHARGE FOLLOW-UP: Primary | ICD-10-CM

## 2020-07-20 DIAGNOSIS — Z79.01 CURRENT USE OF LONG TERM ANTICOAGULATION: ICD-10-CM

## 2020-07-20 LAB
CAPILLARY BLOOD COLLECTION: NORMAL
INR PPP: 2.4 (ref 0.86–1.14)

## 2020-07-20 PROCEDURE — 90670 PCV13 VACCINE IM: CPT | Performed by: NURSE PRACTITIONER

## 2020-07-20 PROCEDURE — 85610 PROTHROMBIN TIME: CPT | Performed by: NURSE PRACTITIONER

## 2020-07-20 PROCEDURE — 36416 COLLJ CAPILLARY BLOOD SPEC: CPT | Performed by: NURSE PRACTITIONER

## 2020-07-20 PROCEDURE — 99214 OFFICE O/P EST MOD 30 MIN: CPT | Mod: 25 | Performed by: NURSE PRACTITIONER

## 2020-07-20 PROCEDURE — G0009 ADMIN PNEUMOCOCCAL VACCINE: HCPCS | Performed by: NURSE PRACTITIONER

## 2020-07-20 ASSESSMENT — MIFFLIN-ST. JEOR: SCORE: 1832.8

## 2020-07-20 NOTE — PROGRESS NOTES
Subjective     Jimi Moreno is a 75 year old male who presents to clinic today for the following health issues:    HPI         Hospital Follow-up Visit:    Hospital/Nursing Home/IP Rehab Facility: [facility]  Date of Admission: 7/07/2020  Date of Discharge: 7/11/2020  Reason(s) for Admission: pneumonia RLL, A-fib with RVR  Was your hospitalization related to COVID-19? No - hadley suspected   Problems taking medications regularly:  None  Medication changes since discharge: None  Problems adhering to non-medication therapy:  None    Summary of hospitalization:  [facility] discharge summary reviewed  Diagnostic Tests/Treatments reviewed.  Follow up needed: with Cardiology  Other Healthcare Providers Involved in Patient s Care:         .  Update since discharge: improved.    Post Discharge Medication Reconciliation: discharge medications reconciled, continue medications without change.  Plan of care communicated with patient              He needs to f/u with Cardiology at [facility] within 2 weeks to eval med change and new diagnosis    Noticing flow of urine is slower, incomplete emptying.  Connie Nova PA-C follows him through Urology      Patient Active Problem List   Diagnosis     CARDIOVASCULAR SCREENING; LDL GOAL LESS THAN 160     Rash     Essential hypertension with goal blood pressure less than 140/90     Atrial fibrillation with RVR (H)     Sepsis (H)     Acute kidney injury (H)     Diarrhea     Pneumonia of right lower lobe due to infectious organism     Benign prostatic hyperplasia with incomplete bladder emptying     Elevated d-dimer     Person under investigation for COVID-19     Past Surgical History:   Procedure Laterality Date     COLONOSCOPY N/A 11/2/2017    Procedure: COMBINED COLONOSCOPY, SINGLE OR MULTIPLE BIOPSY/POLYPECTOMY BY BIOPSY;;  Surgeon: Sanjay Chang MD;  Location: [facility] OR     COLONOSCOPY WITH CO2 INSUFFLATION N/A 11/2/2017    Procedure: COLONOSCOPY  "WITH CO2 INSUFFLATION;  COLON SCREEN/ ELENA;  Surgeon: Sanjay Chang MD;  Location: [facility] OR     NO HISTORY OF SURGERY         Social History     Tobacco Use     Smoking status: Never Smoker     Smokeless tobacco: Never Used   Substance Use Topics     Alcohol use: Yes     Family History   Problem Relation Age of Onset     Cancer No family hx of         no skin cancer         Current Outpatient Medications   Medication Sig Dispense Refill     diltiazem ER COATED BEADS (CARDIZEM CD) 360 MG 24 hr capsule Take 1 capsule (360 mg) by mouth daily 30 capsule 0     finasteride (PROSCAR) 5 MG tablet Take 1 tablet (5 mg) by mouth daily 90 tablet 3     tamsulosin (FLOMAX) 0.4 MG capsule Take 2 capsules (0.8 mg) by mouth daily 180 capsule 3     warfarin ANTICOAGULANT (COUMADIN) 5 MG tablet Take 1 tablet (5 mg) by mouth daily Or as advised by provider 30 tablet 0     Allergies   Allergen Reactions     Pollen Extract/Tree Extract Other (See Comments)     Oak pollen       BP Readings from Last 3 Encounters:   07/20/20 135/80   07/11/20 127/83   09/17/19 138/76    Wt Readings from Last 3 Encounters:   07/20/20 104.6 kg (230 lb 9.6 oz)   07/11/20 107 kg (235 lb 12.8 oz)   09/17/19 105.9 kg (233 lb 8 oz)                    Reviewed and updated as needed this visit by Provider  Tobacco  Allergies  Meds  Problems  Med Hx  Surg Hx  Fam Hx         Review of Systems   Constitutional, HEENT, cardiovascular, pulmonary, gi and gu systems are negative, except as otherwise noted.      Objective    /80 (BP Location: Right arm, Patient Position: Chair, Cuff Size: Adult Large)   Pulse 89   Resp 20   Ht 1.867 m (6' 1.5\")   Wt 104.6 kg (230 lb 9.6 oz)   SpO2 97%   BMI 30.01 kg/m    Body mass index is 30.01 kg/m .  Physical Exam   GENERAL: healthy, alert and no distress  EYES: Eyes grossly normal to inspection, PERRL and conjunctivae and sclerae normal  HENT: ear canals and TM's normal, nose and mouth without ulcers or " "lesions  NECK: no adenopathy  RESP: lungs clear to auscultation - no rales, rhonchi or wheezes  CV: irregularly irregular rhythm, normal S1 S2, no S3 or S4, no murmur, click or rub and no peripheral edema  PSYCH: mentation appears normal, affect normal/bright    Diagnostic Test Results:  Labs reviewed in Epic  Results for orders placed or performed in visit on 07/20/20   INR     Status: Abnormal   Result Value Ref Range    INR 2.40 (H) 0.86 - 1.14   Capillary Blood Collection     Status: None   Result Value Ref Range    Capillary Blood Collection Capillary collection performed            Assessment & Plan     1. Hospital discharge follow-up    2. Pneumonia of right lower lobe due to infectious organism  Clinically improving.  Has completed antibiotic course.    3. Atrial fibrillation with RVR (H)  New A-Fib, Rate controlled today and was newly started on Coumadin in the hospital.  He will follow with the anticoagulation clinic for INR monitoring.  - CARDIOLOGY EVAL ADULT REFERRAL; Future    4. Current use of long term anticoagulation       BMI:   Estimated body mass index is 30.01 kg/m  as calculated from the following:    Height as of this encounter: 1.867 m (6' 1.5\").    Weight as of this encounter: 104.6 kg (230 lb 9.6 oz).         Return in about 3 months (around 10/20/2020) for Physical Exam with PCP.    Francia Finnegan NP  [facility]  "

## 2020-07-20 NOTE — NURSING NOTE
Prior to immunization administration, verified patients identity using patient s name and date of birth. Please see Immunization Activity for additional information.     Screening Questionnaire for Adult Immunization    Are you sick today?   No   Do you have allergies to medications, food, a vaccine component or latex?   No   Have you ever had a serious reaction after receiving a vaccination?   No   Do you have a long-term health problem with heart, lung, kidney, or metabolic disease (e.g., diabetes), asthma, a blood disorder, no spleen, complement component deficiency, a cochlear implant, or a spinal fluid leak?  Are you on long-term aspirin therapy?   No   Do you have cancer, leukemia, HIV/AIDS, or any other immune system problem?   No   Do you have a parent, brother, or sister with an immune system problem?   No   In the past 3 months, have you taken medications that affect  your immune system, such as prednisone, other steroids, or anticancer drugs; drugs for the treatment of rheumatoid arthritis, Crohn s disease, or psoriasis; or have you had radiation treatments?   No   Have you had a seizure, or a brain or other nervous system problem?   No   During the past year, have you received a transfusion of blood or blood    products, or been given immune (gamma) globulin or antiviral drug?   No   For women: Are you pregnant or is there a chance you could become       pregnant during the next month?   No   Have you received any vaccinations in the past 4 weeks?   No     Immunization questionnaire answers were all negative.        Per orders of  Francia DAI, injection of PCV13 given by Addis Gross MA. Patient instructed to remain in clinic for 15 minutes afterwards, and to report any adverse reaction to me immediately.       Screening performed by Addis Gross MA on 7/20/2020 at 6:05 PM.

## 2020-07-20 NOTE — PATIENT INSTRUCTIONS
[facility]     If you have any questions regarding your visit please contact your care team:     Team [place]              Clinic Hours Telephone Number     Dr. Vasquez Finnegan, CNP   7am-7pm  Monday - Thursday   7am-5pm  Fridays  [phone]   (Appointment scheduling available 24/7)     RN Line  [phone] option 2     Urgent Care - [place] and [place] - 11am-9pm Monday-Friday Saturday-Sunday- 9am-5pm     [place] -   5pm-9pm Monday-Friday Saturday-Sunday- 9am-5pm    [phone] - [place]    [phone] - [place]     For a Price Quote for your services, please call our Consumer Price Line at [phone].     What options do I have for visits at the clinic other than the traditional office visit?     To expand how we care for you, many of our providers are utilizing electronic visits (e-visits) and telephone visits, when medically appropriate, for interactions with their patients rather than a visit in the clinic. We also offer nurse visits for many medical concerns. Just like any other service, we will bill your insurance company for this type of visit based on time spent on the phone with your provider. Not all insurance companies cover these visits. Please check with your medical insurance if this type of visit is covered. You will be responsible for any charges that are not paid by your insurance.     E-visits via [employer]: generally incur a $45.00 fee.     Telephone visits:  Time spent on the phone: *charged based on time that is spent on the phone in increments of 10 minutes. Estimated cost:   5-10 mins $30.00   11-20 mins. $59.00   21-30 mins. $85.00       Use [employer] (secure email communication and access to your chart) to send your primary care provider a message or make an appointment. Ask someone on your Team how to sign up for [employer].     As always, Thank you for trusting us with your health care  needs!      [facility]:    Scheduling Appointments  [facility]  Call: [phone]    [facility]  Call: [phone]    [facility]  Call: [phone]    For Gastroenterology referrals   [facility]   Clinics and Surgery Center, 4th Floor   [address] [place] [address]   Appointments: [phone]    WHERE TO GO FOR CARE?    Clinic    Make an appointment if you:       Are sick (cold, cough, flu, sore throat, earache or in pain).       Have a small injury (sprain, small cut, burn or broken bone).       Need a physical exam, Pap smear, vaccine or prescription refill.       Have questions about your health or medicines.    To reach us:      Call [phone] ([phone]). Open 24 hours every day. (For counseling services, call [phone].)    Log into [facility] at [employer]. (Visit [email or web address] to create an account.) Hospital emergency room    An emergency is a serious or life- threatening problem that must be treated right away.    Call 576 or get to the hospital if you have:      Very bad or sudden:            - Chest pain or pressure         - Bleeding         - Head or belly pain         - Dizziness or trouble seeing, walking or                          Speaking      Problems breathing      Blood in your vomit or you are coughing up blood      A major injury (knocked out, loss of a finger or limb, rape, broken bone protruding from skin)    A mental health crisis. (Or call the Mental Health Crisis line at 1-519.480.5933 or Suicide Prevention Hotline at 1-402.780.9840.)    Open 24 hours every day. You don't need an appointment.     Urgent care    Visit urgent care for sickness or small injuries when the clinic is closed. You don't need an appointment. To check hours or find an urgent care near you, visit [email or web address]. Online care    Get online care from [facility] for  more than 70 common problems, like colds, allergies and infections. Open 24 hours every day at:   [email or web address]   Need help deciding?    For advice about where to be seen, you may call your clinic and ask to speak with a nurse. We're here for you 24 hours every day.         If you are deaf or hard of hearing, please let us know. We provide many free services including sign language interpreters, oral interpreters, TTYs, telephone amplifiers, note takers and written materials.

## 2020-07-20 NOTE — PROGRESS NOTES
ANTICOAGULATION FOLLOW-UP CLINIC VISIT    Patient Name:  Jimi Moreno  Date:  7/20/2020  Contact Type:  Telephone  New start up doing well on current dosing will recheck Friday per protocol  SUBJECTIVE:  Patient Findings     Comments:     Assessed for S/S bleeding, clotting, medication, diet, health, activity and alcohol changes          Clinical Outcomes     Negatives:   Major bleeding event, Thromboembolic event, Anticoagulation-related hospital admission, Anticoagulation-related ED visit, Anticoagulation-related fatality    Comments:     Assessed for S/S bleeding, clotting, medication, diet, health, activity and alcohol changes             OBJECTIVE    Recent labs: (last 7 days)     07/20/20  1624   INR 2.40*       ASSESSMENT / PLAN  INR assessment THER    Recheck INR In: 4 DAYS    INR Location Outside lab      Anticoagulation Summary  As of 7/20/2020    INR goal:   2.0-3.0   TTR:   --   INR used for dosing:      Warfarin maintenance plan:   5 mg (5 mg x 1) every Sun, Tue, Fri; 7.5 mg (5 mg x 1.5) all other days   Full warfarin instructions:   5 mg every Sun, Tue, Fri; 7.5 mg all other days   Weekly warfarin total:   45 mg   Plan last modified:   Aby Jung RN (7/15/2020)   Next INR check:   7/24/2020   Target end date:   Indefinite    Indications    Atrial fibrillation with RVR (H) [I48.91]             Anticoagulation Episode Summary     INR check location:       Preferred lab:       Send INR reminders to:   PAMELA GONSALEZ    Comments:         Anticoagulation Care Providers     Provider Role Specialty Phone number    Francia Finnegan LOUIE, NP Referring Nurse Practitioner - Family [phone]            See the Encounter Report to view Anticoagulation Flowsheet and Dosing Calendar (Go to Encounters tab in chart review, and find the Anticoagulation Therapy Visit)    Dosage adjustment made based on physician directed care plan.    Jyotsna Alcantara, RN

## 2020-07-24 ENCOUNTER — ANTICOAGULATION THERAPY VISIT (OUTPATIENT)
Dept: NURSING | Facility: CLINIC | Age: 75
End: 2020-07-24

## 2020-07-24 DIAGNOSIS — I48.91 ATRIAL FIBRILLATION WITH RVR (H): ICD-10-CM

## 2020-07-24 LAB
CAPILLARY BLOOD COLLECTION: NORMAL
INR PPP: 2.2 (ref 0.86–1.14)

## 2020-07-24 PROCEDURE — 85610 PROTHROMBIN TIME: CPT | Performed by: NURSE PRACTITIONER

## 2020-07-24 PROCEDURE — 36416 COLLJ CAPILLARY BLOOD SPEC: CPT | Performed by: NURSE PRACTITIONER

## 2020-07-24 NOTE — PROGRESS NOTES
ANTICOAGULATION FOLLOW-UP CLINIC VISIT    Patient Name:  Jimi Moreno  Date:  7/24/2020  Contact Type:  Telephone    SUBJECTIVE:  Patient Findings     Comments:     Assessed for S/S bleeding, clotting, medication, diet, health, activity and alcohol changes          Clinical Outcomes     Negatives:   Major bleeding event, Thromboembolic event, Anticoagulation-related hospital admission, Anticoagulation-related ED visit, Anticoagulation-related fatality    Comments:     Assessed for S/S bleeding, clotting, medication, diet, health, activity and alcohol changes             OBJECTIVE    Recent labs: (last 7 days)     07/24/20  0832   INR 2.20*       ASSESSMENT / PLAN  INR assessment THER    Recheck INR In: 1 WEEK    INR Location Clinic      Anticoagulation Summary  As of 7/24/2020    INR goal:   2.0-3.0   TTR:   --   INR used for dosing:      Warfarin maintenance plan:   5 mg (5 mg x 1) every Sun, Tue, Fri; 7.5 mg (5 mg x 1.5) all other days   Full warfarin instructions:   5 mg every Sun, Tue, Fri; 7.5 mg all other days   Weekly warfarin total:   45 mg   No change documented:   Jyotsna Alcantara RN   Plan last modified:   Aby Jung RN (7/15/2020)   Next INR check:   7/30/2020   Target end date:   Indefinite    Indications    Atrial fibrillation with RVR (H) [I48.91]             Anticoagulation Episode Summary     INR check location:       Preferred lab:       Send INR reminders to:   PAMELA GONSALEZ    Comments:         Anticoagulation Care Providers     Provider Role Specialty Phone number    Francia Finnegan LOUIE, NP Referring Nurse Practitioner - Family [phone]            See the Encounter Report to view Anticoagulation Flowsheet and Dosing Calendar (Go to Encounters tab in chart review, and find the Anticoagulation Therapy Visit)    Dosage adjustment made based on physician directed care plan.    Jyotsna Alcantara, RN

## 2020-07-30 ENCOUNTER — ANTICOAGULATION THERAPY VISIT (OUTPATIENT)
Dept: NURSING | Facility: CLINIC | Age: 75
End: 2020-07-30
Payer: COMMERCIAL

## 2020-07-30 DIAGNOSIS — I48.91 ATRIAL FIBRILLATION WITH RVR (H): ICD-10-CM

## 2020-07-30 LAB
CAPILLARY BLOOD COLLECTION: NORMAL
INR PPP: 3.1 (ref 0.86–1.14)

## 2020-07-30 PROCEDURE — 99207 ZZC NO CHARGE NURSE ONLY: CPT | Performed by: FAMILY MEDICINE

## 2020-07-30 PROCEDURE — 36415 COLL VENOUS BLD VENIPUNCTURE: CPT | Performed by: NURSE PRACTITIONER

## 2020-07-30 PROCEDURE — 85610 PROTHROMBIN TIME: CPT | Performed by: NURSE PRACTITIONER

## 2020-07-30 NOTE — PROGRESS NOTES
ANTICOAGULATION FOLLOW-UP TELEPHONE VISIT    Patient Name:  Jimi Moreno  Date:  7/30/2020  Contact Type:  Telephone/ Report per below    SUBJECTIVE: 1 week frederick  Patient Findings     Comments:   I spoke to Jimi following INR done at lab.  He denies symptoms of bleeding or clotting.  He is a fairly new start up and discussion started out that he takes 7.5mg on odd days and 5mg on even days?  Today INR is supra-therapeutic.  We will slightly lower his weekly dose by 5%.  I advised his schedule is changed to a set pattern by days of the week; 7.5mg MWF & 5mg SuTuThSa.  Patient verbalized understanding and agreeable to poc.          Clinical Outcomes     Negatives:   Major bleeding event, Thromboembolic event, Anticoagulation-related hospital admission, Anticoagulation-related ED visit, Anticoagulation-related fatality    Comments:   I spoke to Jimi following INR done at lab.  He denies symptoms of bleeding or clotting.  He is a fairly new start up and discussion started out that he takes 7.5mg on odd days and 5mg on even days?  Today INR is supra-therapeutic.  We will slightly lower his weekly dose by 5%.  I advised his schedule is changed to a set pattern by days of the week; 7.5mg MWF & 5mg SuTuThSa.  Patient verbalized understanding and agreeable to poc.             OBJECTIVE    Recent labs: (last 7 days)     07/30/20  1401   INR 3.10*       ASSESSMENT / PLAN  INR assessment SUPRA    Recheck INR In: 8 DAYS    INR Location Outside lab      Anticoagulation Summary  As of 7/30/2020    INR goal:   2.0-3.0   TTR:   88.6 % (6 d)   INR used for dosing:      Warfarin maintenance plan:   7.5 mg (5 mg x 1.5) every Mon, Wed, Fri; 5 mg (5 mg x 1) all other days   Full warfarin instructions:   7.5 mg every Mon, Wed, Fri; 5 mg all other days   Weekly warfarin total:   42.5 mg   Plan last modified:   Laney Bennett RN (7/30/2020)   Next INR check:      Target end date:   Indefinite    Indications    Atrial fibrillation with  RVR (H) [I48.91]             Anticoagulation Episode Summary     INR check location:       Preferred lab:       Send INR reminders to:   PAMELA GONSALEZ    Comments:         Anticoagulation Care Providers     Provider Role Specialty Phone number    Francia Finnegan LOUIE, NP Referring Nurse Practitioner - Family [phone]            See the Encounter Report to view Anticoagulation Flowsheet and Dosing Calendar (Go to Encounters tab in chart review, and find the Anticoagulation Therapy Visit)    Dosage adjustment made based on physician directed care plan.    Laney Bennett RN

## 2020-08-06 ENCOUNTER — TELEPHONE (OUTPATIENT)
Dept: FAMILY MEDICINE | Facility: CLINIC | Age: 75
End: 2020-08-06

## 2020-08-06 DIAGNOSIS — I48.91 ATRIAL FIBRILLATION WITH RVR (H): ICD-10-CM

## 2020-08-06 RX ORDER — WARFARIN SODIUM 5 MG/1
5 TABLET ORAL DAILY
Qty: 30 TABLET | Refills: 0 | Status: SHIPPED | OUTPATIENT
Start: 2020-08-06 | End: 2020-08-11

## 2020-08-06 RX ORDER — DILTIAZEM HYDROCHLORIDE 360 MG/1
360 CAPSULE, EXTENDED RELEASE ORAL DAILY
Qty: 30 CAPSULE | Refills: 0 | Status: SHIPPED | OUTPATIENT
Start: 2020-08-06 | End: 2020-08-11

## 2020-08-06 NOTE — TELEPHONE ENCOUNTER
I called and spoke with patient, advised him Rx's sent.    He has INR lab tomorrow.    Prema Dumont RN  [facility]

## 2020-08-06 NOTE — TELEPHONE ENCOUNTER
Reason for Call:  Medication or medication refill:    Do you use a [facility]?  Name of the pharmacy and phone number for the current request:  [facility] / [facility] [id number]     Name of the medication requested:  Patient was in hospital at the beginning of July and the doctor in hospital started him on warfarin ANTICOAGULANT (COUMADIN) 5 MG tablet . Patients PCP was Dr TAPIA but patient has an appointment next week with Dr Pedersen to establish care. Patient is completely out of this medication and he takes it at night so he needs this refilled today.  Patient is also almost our of diltiazem ER COATED BEADS (CARDIZEM CD) 360 MG 24 hr capsule - patient is not sure if this a medication that he to continue taking or if he can stop taking this now. Please call patient to discuss.    Other request:     Can we leave a detailed message on this number? YES    Phone number patient can be reached at: Home number on file [phone] (home)    Best Time: any    Call taken on 8/6/2020 at 9:33 AM by Adela Walsh

## 2020-08-07 ENCOUNTER — ANTICOAGULATION THERAPY VISIT (OUTPATIENT)
Dept: NURSING | Facility: CLINIC | Age: 75
End: 2020-08-07

## 2020-08-07 DIAGNOSIS — I48.91 ATRIAL FIBRILLATION WITH RVR (H): ICD-10-CM

## 2020-08-07 LAB
CAPILLARY BLOOD COLLECTION: NORMAL
INR PPP: 3.3 (ref 0.86–1.14)

## 2020-08-07 PROCEDURE — 85610 PROTHROMBIN TIME: CPT | Performed by: NURSE PRACTITIONER

## 2020-08-07 PROCEDURE — 36416 COLLJ CAPILLARY BLOOD SPEC: CPT | Performed by: NURSE PRACTITIONER

## 2020-08-07 NOTE — PROGRESS NOTES
ANTICOAGULATION MANAGEMENT     Patient Name:  Jimi Moreno  Date:  8/7/2020    ASSESSMENT /SUBJECTIVE:    Today's INR result of 3.3 is supratherapeutic. Goal INR of 2.0-3.0      Warfarin dose taken: Warfarin taken as previously instructed    Diet: No new diet changes affecting INR    Medication changes/ interactions: No new medications/supplements affecting INR    Previous INR: Supratherapeutic     S/S of bleeding or thromboembolism: No    New injury or illness: No    Upcoming surgery, procedure or cardioversion: No    Additional findings: None      PLAN:    Spoke with Jimi regarding INR result and instructed:     Warfarin Dosing Instructions: Change your warfarin dose to 7.5 mg Mon/Fri and 5 mg ROW    Instructed patient to follow up no later than: 1 week  Lab visit scheduled    Education provided: Target INR goal and significance of current INR result      Jimi verbalizes understanding and agrees to warfarin dosing plan.    Instructed to call the Anticoagulation Clinic for any changes, questions or concerns. (#[phone])        Aby Jung RN      OBJECTIVE:  Recent labs: (last 7 days)     08/07/20  0850   INR 3.30*         No question data found.  Anticoagulation Summary  As of 8/7/2020    INR goal:   2.0-3.0   TTR:   40.6 % (2 wk)   INR used for dosing:   3.30! (8/7/2020)   Warfarin maintenance plan:   7.5 mg (5 mg x 1.5) every Mon, Fri; 5 mg (5 mg x 1) all other days   Full warfarin instructions:   7.5 mg every Mon, Fri; 5 mg all other days   Weekly warfarin total:   40 mg   Plan last modified:   Aby Jung, DONNA (8/7/2020)   Next INR check:   8/14/2020   Target end date:   Indefinite    Indications    Atrial fibrillation with RVR (H) [I48.91]             Anticoagulation Episode Summary     INR check location:       Preferred lab:       Send INR reminders to:   PAMELA GONSALEZ    Comments:         Anticoagulation Care Providers     Provider Role Specialty Phone number    Francia Finnegan, [facility]  Referring Nurse Practitioner - Family [phone]

## 2020-08-11 ENCOUNTER — OFFICE VISIT (OUTPATIENT)
Dept: FAMILY MEDICINE | Facility: CLINIC | Age: 75
End: 2020-08-11
Payer: COMMERCIAL

## 2020-08-11 VITALS
BODY MASS INDEX: 29.55 KG/M2 | SYSTOLIC BLOOD PRESSURE: 125 MMHG | WEIGHT: 230.25 LBS | HEART RATE: 94 BPM | DIASTOLIC BLOOD PRESSURE: 81 MMHG | TEMPERATURE: 98.3 F | HEIGHT: 74 IN

## 2020-08-11 DIAGNOSIS — Z00.00 ENCOUNTER FOR MEDICARE ANNUAL WELLNESS EXAM: Primary | ICD-10-CM

## 2020-08-11 DIAGNOSIS — Z13.6 CARDIOVASCULAR SCREENING; LDL GOAL LESS THAN 100: ICD-10-CM

## 2020-08-11 DIAGNOSIS — I48.91 ATRIAL FIBRILLATION WITH RVR (H): ICD-10-CM

## 2020-08-11 DIAGNOSIS — N40.1 BENIGN PROSTATIC HYPERPLASIA WITH LOWER URINARY TRACT SYMPTOMS, SYMPTOM DETAILS UNSPECIFIED: ICD-10-CM

## 2020-08-11 DIAGNOSIS — R53.83 FATIGUE, UNSPECIFIED TYPE: ICD-10-CM

## 2020-08-11 DIAGNOSIS — N40.0 ENLARGED PROSTATE: ICD-10-CM

## 2020-08-11 DIAGNOSIS — R73.01 IMPAIRED FASTING GLUCOSE: ICD-10-CM

## 2020-08-11 LAB
ALBUMIN SERPL-MCNC: 3.7 G/DL (ref 3.4–5)
ALP SERPL-CCNC: 92 U/L (ref 40–150)
ALT SERPL W P-5'-P-CCNC: 32 U/L (ref 0–70)
ANION GAP SERPL CALCULATED.3IONS-SCNC: 8 MMOL/L (ref 3–14)
AST SERPL W P-5'-P-CCNC: 17 U/L (ref 0–45)
BASOPHILS # BLD AUTO: 0 10E9/L (ref 0–0.2)
BASOPHILS NFR BLD AUTO: 0.4 %
BILIRUB SERPL-MCNC: 0.7 MG/DL (ref 0.2–1.3)
BUN SERPL-MCNC: 12 MG/DL (ref 7–30)
CALCIUM SERPL-MCNC: 8.5 MG/DL (ref 8.5–10.1)
CHLORIDE SERPL-SCNC: 110 MMOL/L (ref 94–109)
CHOLEST SERPL-MCNC: 158 MG/DL
CO2 SERPL-SCNC: 21 MMOL/L (ref 20–32)
CREAT SERPL-MCNC: 1.2 MG/DL (ref 0.66–1.25)
DIFFERENTIAL METHOD BLD: NORMAL
EOSINOPHIL # BLD AUTO: 0.2 10E9/L (ref 0–0.7)
EOSINOPHIL NFR BLD AUTO: 3.5 %
ERYTHROCYTE [DISTWIDTH] IN BLOOD BY AUTOMATED COUNT: 13.7 % (ref 10–15)
GFR SERPL CREATININE-BSD FRML MDRD: 58 ML/MIN/{1.73_M2}
GLUCOSE SERPL-MCNC: 101 MG/DL (ref 70–99)
HBA1C MFR BLD: 5.7 % (ref 0–5.6)
HCT VFR BLD AUTO: 46.1 % (ref 40–53)
HDLC SERPL-MCNC: 47 MG/DL
HGB BLD-MCNC: 15.9 G/DL (ref 13.3–17.7)
LDLC SERPL CALC-MCNC: 90 MG/DL
LYMPHOCYTES # BLD AUTO: 1.3 10E9/L (ref 0.8–5.3)
LYMPHOCYTES NFR BLD AUTO: 26.9 %
MCH RBC QN AUTO: 31.1 PG (ref 26.5–33)
MCHC RBC AUTO-ENTMCNC: 34.5 G/DL (ref 31.5–36.5)
MCV RBC AUTO: 90 FL (ref 78–100)
MONOCYTES # BLD AUTO: 0.6 10E9/L (ref 0–1.3)
MONOCYTES NFR BLD AUTO: 11.4 %
NEUTROPHILS # BLD AUTO: 2.8 10E9/L (ref 1.6–8.3)
NEUTROPHILS NFR BLD AUTO: 57.8 %
NONHDLC SERPL-MCNC: 111 MG/DL
PLATELET # BLD AUTO: 199 10E9/L (ref 150–450)
POTASSIUM SERPL-SCNC: 4.4 MMOL/L (ref 3.4–5.3)
PROT SERPL-MCNC: 7 G/DL (ref 6.8–8.8)
RBC # BLD AUTO: 5.11 10E12/L (ref 4.4–5.9)
SODIUM SERPL-SCNC: 139 MMOL/L (ref 133–144)
TRIGL SERPL-MCNC: 106 MG/DL
TSH SERPL DL<=0.005 MIU/L-ACNC: 2.8 MU/L (ref 0.4–4)
WBC # BLD AUTO: 4.9 10E9/L (ref 4–11)

## 2020-08-11 PROCEDURE — 80053 COMPREHEN METABOLIC PANEL: CPT | Performed by: FAMILY MEDICINE

## 2020-08-11 PROCEDURE — 99397 PER PM REEVAL EST PAT 65+ YR: CPT | Performed by: FAMILY MEDICINE

## 2020-08-11 PROCEDURE — 83036 HEMOGLOBIN GLYCOSYLATED A1C: CPT | Performed by: FAMILY MEDICINE

## 2020-08-11 PROCEDURE — 99213 OFFICE O/P EST LOW 20 MIN: CPT | Mod: 25 | Performed by: FAMILY MEDICINE

## 2020-08-11 PROCEDURE — 80061 LIPID PANEL: CPT | Performed by: FAMILY MEDICINE

## 2020-08-11 PROCEDURE — 85025 COMPLETE CBC W/AUTO DIFF WBC: CPT | Performed by: FAMILY MEDICINE

## 2020-08-11 PROCEDURE — 84443 ASSAY THYROID STIM HORMONE: CPT | Performed by: FAMILY MEDICINE

## 2020-08-11 PROCEDURE — 36415 COLL VENOUS BLD VENIPUNCTURE: CPT | Performed by: FAMILY MEDICINE

## 2020-08-11 RX ORDER — WARFARIN SODIUM 5 MG/1
5 TABLET ORAL DAILY
Qty: 30 TABLET | Refills: 2 | Status: SHIPPED | OUTPATIENT
Start: 2020-08-11 | End: 2020-09-25

## 2020-08-11 RX ORDER — DILTIAZEM HYDROCHLORIDE 360 MG/1
360 CAPSULE, EXTENDED RELEASE ORAL DAILY
Qty: 30 CAPSULE | Refills: 2 | Status: SHIPPED | OUTPATIENT
Start: 2020-08-11 | End: 2020-09-02

## 2020-08-11 RX ORDER — TAMSULOSIN HYDROCHLORIDE 0.4 MG/1
0.8 CAPSULE ORAL DAILY
Qty: 180 CAPSULE | Refills: 3 | Status: SHIPPED | OUTPATIENT
Start: 2020-08-11 | End: 2021-09-08

## 2020-08-11 RX ORDER — FINASTERIDE 5 MG/1
1 TABLET, FILM COATED ORAL DAILY
Qty: 90 TABLET | Refills: 3 | Status: SHIPPED | OUTPATIENT
Start: 2020-08-11 | End: 2021-09-08

## 2020-08-11 ASSESSMENT — PAIN SCALES - GENERAL: PAINLEVEL: NO PAIN (0)

## 2020-08-11 ASSESSMENT — MIFFLIN-ST. JEOR: SCORE: 1831.22

## 2020-08-11 NOTE — PATIENT INSTRUCTIONS
Patient Education   Personalized Prevention Plan  You are due for the preventive services outlined below.  Your care team is available to assist you in scheduling these services.  If you have already completed any of these items, please share that information with your care team to update in your medical record.  Health Maintenance Due   Topic Date Due     Heart Failure Action Plan  1945     Zoster (Shingles) Vaccine (1 of 2) 01/05/1993     Annual Wellness Visit  01/05/2008     Cholesterol Lab  05/22/2019     FALL RISK ASSESSMENT  11/21/2019     PHQ-2  01/01/2020               Continue same meds for now    Stop  Alcohol until you see cardiology ( to see if this  Affects the atrial fibrillation)    Increase walking as you are able    Call with problems/ questions

## 2020-08-11 NOTE — PROGRESS NOTES
"  SUBJECTIVE:   Jimi Moreno is a 75 year old male who presents for Preventive Visit.      Are you in the first 12 months of your Medicare Part B coverage?  No    Physical Health:    In general, how would you rate your overall physical health? good    Outside of work, how many days during the week do you exercise? none    Outside of work, approximately how many minutes a day do you exercise?not applicable  If you drink alcohol do you typically have >3 drinks per day or >7 drinks per week? Yes - AUDIT SCORE:     No flowsheet data found.    Do you usually eat at least 4 servings of fruit and vegetables a day, include whole grains & fiber and avoid regularly eating high fat or \"junk\" foods? NO    Do you have any problems taking medications regularly?  No    Do you have any side effects from medications? none    Needs assistance for the following daily activities: no assistance needed    Which of the following safety concerns are present in your home?  none identified     Hearing impairment: No    In the past 6 months, have you been bothered by leaking of urine? no    Mental Health:    In general, how would you rate your overall mental or emotional health? good  PHQ-2 Score: 0    Do you feel safe in your environment? Yes    Have you ever done Advance Care Planning? (For example, a Health Directive, POLST, or a discussion with a medical provider or your loved ones about your wishes): Yes, advance care planning is on file.    Additional concerns to address?  No    Fall risk:  Fallen 2 or more times in the past year?: No  Any fall with injury in the past year?: No    Cognitive Screenin) Repeat 3 items (Leader, Season, Table)     2) Clock draw:  NORMAL  3) 3 item recall:  Recalls 3 objects  Results: NORMAL clock, 1-2 items recalled: COGNITIVE IMPAIRMENT LESS LIKELY    Mini-CogTM Copyright S Jagjit. Licensed by the author for use in [facility]; reprinted with permission (mark@.edu). All rights " "reserved.      Do you have sleep apnea, excessive snoring or daytime drowsiness?: no             Reviewed and updated as needed this visit by clinical staff  Tobacco  Allergies  Meds  Med Hx  Surg Hx  Fam Hx  Soc Hx        Reviewed and updated as needed this visit by Provider        Social History     Tobacco Use     Smoking status: Never Smoker     Smokeless tobacco: Never Used   Substance Use Topics     Alcohol use: Yes                           Current providers sharing in care for this patient include:    Patient Care Team:  No Ref-Primary, Physician as PCP - Francia Marshall NP as Assigned PCP    The following health maintenance items are reviewed in Epic and correct as of today:  Health Maintenance   Topic Date Due     HF ACTION PLAN  1945     ZOSTER IMMUNIZATION (1 of 2) 01/05/1993     LIPID  05/22/2019     FALL RISK ASSESSMENT  11/21/2019     INFLUENZA VACCINE (1) 09/01/2020     BMP  01/10/2021     ALT  07/07/2021     CBC  07/08/2021     PNEUMOCOCCAL IMMUNIZATION 65+ LOW/MEDIUM RISK (2 of 2 - PPSV23) 07/20/2021     MEDICARE ANNUAL WELLNESS VISIT  08/11/2021     COLORECTAL CANCER SCREENING  11/02/2022     ADVANCE CARE PLANNING  08/11/2025     DTAP/TDAP/TD IMMUNIZATION (2 - Td) 09/26/2025     TSH W/FREE T4 REFLEX  Completed     PHQ-2  Completed     IPV IMMUNIZATION  Aged Out     MENINGITIS IMMUNIZATION  Aged Out             ROS:  Hospitalized in July  Pneumonia  Maybe  Heart?  July 7 to 11    Atrial  Fibrillation    No exercise currently    Kids in good health    OBJECTIVE:   /81 (BP Location: Left arm, Patient Position: Chair, Cuff Size: Adult Regular)   Pulse 94   Temp 98.3  F (36.8  C) (Oral)   Ht 1.867 m (6' 1.5\")   Wt 104.4 kg (230 lb 4 oz)   BMI 29.97 kg/m   Estimated body mass index is 29.97 kg/m  as calculated from the following:    Height as of this encounter: 1.867 m (6' 1.5\").    Weight as of this encounter: 104.4 kg (230 lb 4 oz).  EXAM:   GENERAL: healthy, alert " and no distress  EYES: Eyes grossly normal to inspection, PERRL and conjunctivae and sclerae normal  HENT: ear canals and TM's normal, nose and mouth without ulcers or lesions  NECK: no adenopathy, no asymmetry, masses, or scars and thyroid normal to palpation  RESP: lungs clear to auscultation - no rales, rhonchi or wheezes  CV: normal S1 S2, no S3 or S4, no murmur, click or rub, peripheral pulses strong, irregularly irregular  ABDOMEN: soft, nontender, no hepatosplenomegaly, no masses and bowel sounds normal  MS: no gross musculoskeletal defects noted, no edema  SKIN: no suspicious lesions or rashes  NEURO: Normal strength and tone, mentation intact and speech normal  PSYCH: mentation appears normal, affect normal/bright    Diagnostic Test Results:  Labs reviewed in Epic    ASSESSMENT / PLAN:   Jimi was seen today for wellness visit and health maintenance.    Diagnoses and all orders for this visit:    Encounter for Medicare annual wellness exam    Atrial fibrillation with RVR (H)  -     warfarin ANTICOAGULANT (COUMADIN) 5 MG tablet; Take 1 tablet (5 mg) by mouth daily Or as advised by provider  -     diltiazem ER COATED BEADS (CARDIZEM CD) 360 MG 24 hr capsule; Take 1 capsule (360 mg) by mouth daily    Enlarged prostate  -     finasteride (PROSCAR) 5 MG tablet; Take 1 tablet (5 mg) by mouth daily    Benign prostatic hyperplasia with lower urinary tract symptoms, symptom details unspecified  -     tamsulosin (FLOMAX) 0.4 MG capsule; Take 2 capsules (0.8 mg) by mouth daily    CARDIOVASCULAR SCREENING; LDL GOAL LESS THAN 100  -     Lipid panel reflex to direct LDL Non-fasting    Impaired fasting glucose  -     Hemoglobin A1c    Fatigue, unspecified type  -     TSH with free T4 reflex  -     CBC with platelets differential  -     Comprehensive metabolic panel    Discussed multiple issues with patient  Still in atrial fibrillation; has some atrial enlargement so this may be a permanent thing  However advised he stop  "etoh from now until he sees cardiology as a therapeutic trial to see if this affects afib  Stay on same meds for now  Refills given  Apparently the diltiazem is expensive; he will ask cardiology about this  Check labs ( only had  Banana this am )  Advise eye and dental exams  Urinating fine on the  Two urology meds; refills given    COUNSELING:  Reviewed preventive health counseling, as reflected in patient instructions       Regular exercise       Healthy diet/nutrition       Vision screening       Dental care    Estimated body mass index is 29.97 kg/m  as calculated from the following:    Height as of this encounter: 1.867 m (6' 1.5\").    Weight as of this encounter: 104.4 kg (230 lb 4 oz).    Weight management plan: Discussed healthy diet and exercise guidelines     reports that he has never smoked. He has never used smokeless tobacco.      Appropriate preventive services were discussed with this patient, including applicable screening as appropriate for cardiovascular disease, diabetes, osteopenia/osteoporosis, and glaucoma.  As appropriate for age/gender, discussed screening for colorectal cancer, prostate cancer, breast cancer, and cervical cancer. Checklist reviewing preventive services available has been given to the patient.    Reviewed patients plan of care and provided an AVS. The Basic Care Plan (routine screening as documented in Health Maintenance) for Jimi meets the Care Plan requirement. This Care Plan has been established and reviewed with the Patient.    Counseling Resources:  ATP IV Guidelines  Pooled Cohorts Equation Calculator  Breast Cancer Risk Calculator  FRAX Risk Assessment  ICSI Preventive Guidelines  Dietary Guidelines for Americans, 2010  USDA's MyPlate  ASA Prophylaxis  Lung CA Screening    Rodolfo Pedersen MD  [facility]  "

## 2020-08-14 ENCOUNTER — ANTICOAGULATION THERAPY VISIT (OUTPATIENT)
Dept: NURSING | Facility: CLINIC | Age: 75
End: 2020-08-14

## 2020-08-14 DIAGNOSIS — I48.91 ATRIAL FIBRILLATION WITH RVR (H): ICD-10-CM

## 2020-08-14 LAB
CAPILLARY BLOOD COLLECTION: NORMAL
INR PPP: 1.5 (ref 0.86–1.14)

## 2020-08-14 PROCEDURE — 36416 COLLJ CAPILLARY BLOOD SPEC: CPT | Performed by: NURSE PRACTITIONER

## 2020-08-14 PROCEDURE — 85610 PROTHROMBIN TIME: CPT | Performed by: NURSE PRACTITIONER

## 2020-08-14 NOTE — PROGRESS NOTES
ANTICOAGULATION FOLLOW-UP CLINIC VISIT    Patient Name:  Jimi Moreno  Date:  2020  Contact Type:  Telephone  Stopped drinking daily alcohol thoughts were it is contributing to palpitations. He will see cardi logy soon and discuss. Dose adjusted up.  SUBJECTIVE:  Patient Findings     Positives:   Change in alcohol use (stopped his dailly befor dinner alcohol drink)    Comments:     Assessed for S/S bleeding, clotting, medication, diet, health, activity and alcohol changes          Clinical Outcomes     Negatives:   Major bleeding event, Thromboembolic event, Anticoagulation-related hospital admission, Anticoagulation-related ED visit, Anticoagulation-related fatality    Comments:     Assessed for S/S bleeding, clotting, medication, diet, health, activity and alcohol changes             OBJECTIVE    Recent labs: (last 7 days)     20  1310   INR 1.50*       ASSESSMENT / PLAN  INR assessment SUB    Recheck INR In: 1 WEEK    INR Location Clinic      Anticoagulation Summary  As of 2020    INR goal:   2.0-3.0   TTR:   45.6 % (3 wk)   INR used for dosin.50! (2020)   Warfarin maintenance plan:   7.5 mg (5 mg x 1.5) every Mon, Fri; 5 mg (5 mg x 1) all other days   Full warfarin instructions:   : 10 mg; : 7.5 mg; Otherwise 7.5 mg every Mon, Fri; 5 mg all other days   Weekly warfarin total:   40 mg   Plan last modified:   Aby Jung, DONNA (2020)   Next INR check:   2020   Target end date:   Indefinite    Indications    Atrial fibrillation with RVR (H) [I48.91]             Anticoagulation Episode Summary     INR check location:       Preferred lab:       Send INR reminders to:   PAMELA GONSALEZ    Comments:         Anticoagulation Care Providers     Provider Role Specialty Phone number    Francia Finnegan NP Referring Nurse Practitioner - Family [phone]            See the Encounter Report to view Anticoagulation Flowsheet and Dosing Calendar (Go to Encounters tab in  chart review, and find the Anticoagulation Therapy Visit)    Some signs and symptoms of clots include: pain or tenderness in arm or leg, swelling in arm or leg, changes in skin color, or area is warm to touch, shortness or breath, trouble breathing.  Numbness or weakness especially on 1 side of the body, sudden trouble speaking or swallowing, sudden trouble seeing, sudden confusion, dizzy spells or headache.  If you have these please call 911 or seek medical care immediately.      Jyotsna Alcantara RN

## 2020-08-21 DIAGNOSIS — I48.91 ATRIAL FIBRILLATION WITH RVR (H): ICD-10-CM

## 2020-08-21 LAB
CAPILLARY BLOOD COLLECTION: NORMAL
INR PPP: 1.8 (ref 0.86–1.14)

## 2020-08-21 PROCEDURE — 36416 COLLJ CAPILLARY BLOOD SPEC: CPT | Performed by: FAMILY MEDICINE

## 2020-08-21 PROCEDURE — 85610 PROTHROMBIN TIME: CPT | Performed by: FAMILY MEDICINE

## 2020-08-28 ENCOUNTER — ANTICOAGULATION THERAPY VISIT (OUTPATIENT)
Dept: FAMILY MEDICINE | Facility: CLINIC | Age: 75
End: 2020-08-28

## 2020-08-28 DIAGNOSIS — I48.91 ATRIAL FIBRILLATION WITH RVR (H): ICD-10-CM

## 2020-08-28 LAB
CAPILLARY BLOOD COLLECTION: NORMAL
INR PPP: 2.2 (ref 0.86–1.14)

## 2020-08-28 PROCEDURE — 85610 PROTHROMBIN TIME: CPT | Performed by: NURSE PRACTITIONER

## 2020-08-28 PROCEDURE — 36416 COLLJ CAPILLARY BLOOD SPEC: CPT | Performed by: NURSE PRACTITIONER

## 2020-08-28 NOTE — PROGRESS NOTES
ANTICOAGULATION MANAGEMENT     Patient Name:  Jimi Moreno  Date:  2020    ASSESSMENT /SUBJECTIVE:    Today's INR result of 2.2 is therapeutic. Goal INR of 2.0-3.0      Warfarin dose taken: Warfarin taken as previously instructed    Diet: No new diet changes affecting INR    Medication changes/ interactions: No new medications/supplements affecting INR    Previous INR: Therapeutic     S/S of bleeding or thromboembolism: No    New injury or illness: No    Upcoming surgery, procedure or cardioversion: No    Additional findings: None      PLAN:    Spoke with Jimi regarding INR result and instructed:     Warfarin Dosing Instructions: Continue your current warfarin dose 7.5 mg Mon, Wed, Fri, Sat and 5 mg daily all other days of the week.    Instructed patient to follow up no later than: 2 weeks  Lab visit scheduled    Education provided: Importance of notifying clinic for changes in medications and Monitoring for clotting signs and symptoms      Jimi verbalizes understanding and agrees to warfarin dosing plan.    Instructed to call the Anticoagulation Clinic for any changes, questions or concerns. (#[phone])        Kely Garcia [facility]      OBJECTIVE:  Recent labs: (last 7 days)     20  1407   INR 2.20*         No question data found.  Anticoagulation Summary  As of 2020    INR goal:   2.0-3.0   TTR:   37.5 % (1.2 mo)   INR used for dosin.20 (2020)   Warfarin maintenance plan:   5 mg (5 mg x 1) every Sun, Tue, Thu; 7.5 mg (5 mg x 1.5) all other days   Full warfarin instructions:   5 mg every Sun, Tue, Thu; 7.5 mg all other days   Weekly warfarin total:   45 mg   No change documented:   Kely Garcia [facility]   Plan last modified:   Jyotsna Alcantara RN (2020)   Next INR check:   2020   Target end date:   Indefinite    Indications    Atrial fibrillation with RVR (H) [I48.91]             Anticoagulation Episode Summary     INR check location:       Preferred lab:        Send INR reminders to:   PAMELA GONSALEZ    Comments:         Anticoagulation Care Providers     Provider Role Specialty Phone number    Francia Finnegan, NP Referring Nurse Practitioner - Family [phone]

## 2020-09-02 ENCOUNTER — OFFICE VISIT (OUTPATIENT)
Dept: CARDIOLOGY | Facility: CLINIC | Age: 75
End: 2020-09-02
Payer: COMMERCIAL

## 2020-09-02 ENCOUNTER — ANCILLARY PROCEDURE (OUTPATIENT)
Dept: CARDIOLOGY | Facility: CLINIC | Age: 75
End: 2020-09-02
Attending: INTERNAL MEDICINE
Payer: COMMERCIAL

## 2020-09-02 VITALS
SYSTOLIC BLOOD PRESSURE: 153 MMHG | WEIGHT: 234 LBS | OXYGEN SATURATION: 95 % | HEART RATE: 68 BPM | BODY MASS INDEX: 30.45 KG/M2 | DIASTOLIC BLOOD PRESSURE: 88 MMHG

## 2020-09-02 DIAGNOSIS — I48.91 ATRIAL FIBRILLATION WITH RVR (H): ICD-10-CM

## 2020-09-02 DIAGNOSIS — I48.19 PERSISTENT ATRIAL FIBRILLATION (H): ICD-10-CM

## 2020-09-02 DIAGNOSIS — I48.19 PERSISTENT ATRIAL FIBRILLATION (H): Primary | ICD-10-CM

## 2020-09-02 PROCEDURE — 99203 OFFICE O/P NEW LOW 30 MIN: CPT | Performed by: INTERNAL MEDICINE

## 2020-09-02 PROCEDURE — 0298T LEADLESS EKG MONITOR 3 TO 14 DAYS: CPT | Performed by: INTERNAL MEDICINE

## 2020-09-02 PROCEDURE — 93000 ELECTROCARDIOGRAM COMPLETE: CPT | Performed by: INTERNAL MEDICINE

## 2020-09-02 PROCEDURE — 0296T LEADLESS EKG MONITOR 3 TO 14 DAYS: CPT | Performed by: INTERNAL MEDICINE

## 2020-09-02 RX ORDER — DILTIAZEM HYDROCHLORIDE 360 MG/1
360 CAPSULE, EXTENDED RELEASE ORAL DAILY
Qty: 90 CAPSULE | Refills: 3 | Status: SHIPPED | OUTPATIENT
Start: 2020-09-02 | End: 2021-10-27

## 2020-09-02 NOTE — PROGRESS NOTES
Referring provider: Francia Finnegan NP    HPI: Mr. Jimi Moreno is a 75 year old  male with PMH significant for BPH but otherwise unremarkable.    Patient was recently admitted to [facility] on 7/7/2020 with right lower lobe pneumonia.  He was tested negative for COVID-19.  He was treated for community-acquired pneumonia which he responded well.  He was also found to be in atrial fibrillation with RVR.  Patient was started on diltiazem and warfarin. No prior history of cardiac disease or afib.    Patient is overall feeling well from cardiac standpoint.  He denies chest discomfort, dyspnea, PND, orthopnea, pedal edema, palpitations, lightheadedness, or syncope.  No daytime sleepiness or snoring.  His is retired now and not physically very active.    Patient drinks 3 alcoholic beverages per day.  Lifetime non-smoker.  No history of hypertension, diabetes or hyperlipidemia.      Echocardiogram 7/8/2020 showed normal biventricular function with mild LVH.  He was in atrial fibrillation during the study.  No significant valve disease was noted.    I reviewed EKG in clinic today which showed atrial fibrillation heart rate 82 bpm.  Patient is currently on diltiazem 360 mg once daily, tamsulosin, finasteride and warfarin.    Medications, personal, family, and social history reviewed with patient and revised.    PAST MEDICAL HISTORY:  Past Medical History:   Diagnosis Date     Sinusitis acute      Unspecified essential hypertension      Varicose vein        CURRENT MEDICATIONS:  Current Outpatient Medications   Medication Sig Dispense Refill     diltiazem ER COATED BEADS (CARDIZEM CD) 360 MG 24 hr capsule Take 1 capsule (360 mg) by mouth daily 30 capsule 2     finasteride (PROSCAR) 5 MG tablet Take 1 tablet (5 mg) by mouth daily 90 tablet 3     tamsulosin (FLOMAX) 0.4 MG capsule Take 2 capsules (0.8 mg) by mouth daily 180 capsule 3     warfarin ANTICOAGULANT (COUMADIN) 5 MG tablet Take 1 tablet (5 mg) by mouth daily Or as  advised by provider 30 tablet 2       PAST SURGICAL HISTORY:  Past Surgical History:   Procedure Laterality Date     COLONOSCOPY N/A 11/2/2017    Procedure: COMBINED COLONOSCOPY, SINGLE OR MULTIPLE BIOPSY/POLYPECTOMY BY BIOPSY;;  Surgeon: Sanjay Chang MD;  Location: [facility] OR     COLONOSCOPY WITH CO2 INSUFFLATION N/A 11/2/2017    Procedure: COLONOSCOPY WITH CO2 INSUFFLATION;  COLON SCREEN/ [id number];  Surgeon: Sanjay Chang MD;  Location: [facility] OR     NO HISTORY OF SURGERY         ALLERGIES:     Allergies   Allergen Reactions     Pollen Extract/Tree Extract Other (See Comments)     Oak pollen         FAMILY HISTORY:  Family History   Problem Relation Age of Onset     Cancer No family hx of         no skin cancer         SOCIAL HISTORY:  Social History     Tobacco Use     Smoking status: Never Smoker     Smokeless tobacco: Never Used   Substance Use Topics     Alcohol use: Yes     Comment: 2  drinks daily     Drug use: No       ROS:   Constitutional: No fever, chills, or sweats. Weight stable.   ENT: No visual disturbance, ear ache, epistaxis, sore throat.   Cardiovascular: As per HPI.   Respiratory: No cough, hemoptysis.    GI: No nausea, vomiting, hematemesis, melena, or hematochezia.   : No hematuria.   Integument: Negative.   Psychiatric: Negative.   Hematologic:  No easy bruising, no easy bleeding.  Neuro: Negative.   Endocrinology: No significant heat or cold intolerance   Musculoskeletal: No myalgia.    Exam:  BP (!) 153/83 (BP Location: Left arm, Patient Position: Sitting, Cuff Size: Adult Large)   Pulse 91   Wt 106.1 kg (234 lb)   SpO2 95%   BMI 30.45 kg/m    GENERAL APPEARANCE: alert and no distress  HEENT: no icterus, no central cyanosis  LYMPH/NECK: no adenopathy, no asymmetry, JVP not elevated, no carotid bruits.  RESPIRATORY: lungs clear to auscultation - no rales, rhonchi or wheezes, no use of accessory muscles, no retractions, respirations are unlabored, normal respiratory  rate  CARDIOVASCULAR: irregular rhythm, normal S1, S2, no S3 or S4 and no murmur, click or rub, precordium quiet with normal PMI.  GI: soft, non tender  EXTREMITIES:no edema  NEURO: alert, normal speech,and affect  SKIN: no ecchymoses, no rashes     I have reviewed the labs and personally reviewed the imaging below and made my comment in the assessment and plan.    Labs:  CBC RESULTS:   Lab Results   Component Value Date    WBC 4.9 08/11/2020    RBC 5.11 08/11/2020    HGB 15.9 08/11/2020    HCT 46.1 08/11/2020    MCV 90 08/11/2020    MCH 31.1 08/11/2020    MCHC 34.5 08/11/2020    RDW 13.7 08/11/2020     08/11/2020       BMP RESULTS:  Lab Results   Component Value Date     08/11/2020    POTASSIUM 4.4 08/11/2020    CHLORIDE 110 (H) 08/11/2020    CO2 21 08/11/2020    ANIONGAP 8 08/11/2020     (H) 08/11/2020    BUN 12 08/11/2020    CR 1.20 08/11/2020    GFRESTIMATED 58 (L) 08/11/2020    GFRESTBLACK 67 08/11/2020    MELIDA 8.5 08/11/2020        INR RESULTS:  Lab Results   Component Value Date    INR 2.20 (H) 08/28/2020    INR 1.80 (H) 08/21/2020    INR 1.50 (H) 08/14/2020    INR 3.30 (H) 08/07/2020       Echocardiogram 7/8/2020  The rhythm was atrial fibrillation.  Left ventricular systolic function is normal.  The visual ejection fraction is estimated at 60-65%.  The left ventricle is normal in size.  There is mild concentric left ventricular hypertrophy.  The right ventricle is normal in structure, function and size.  There is moderate biatrial enlargement.  Doppler interrogation does not demonstrate significant stenosis or  insufficiency involving cardiac valves.   No old studies for compariosn.    EKG 7/7/2020 atrial fibrillation with RVR        EKG reviewed in clinic 9/2/2020: Incomplete right bundle branch block, left axis deviation, atrial fibrillation heart rate 82 bpm    Assessment and Plan:   Mr. Jimi Moreno is a 75 year old  male with PMH significant for BPH but otherwise  unremarkable.    Persistent atrial fibrillation: Patient was found to be in atrial fibrillation with RVR when he presented to hospital in July of this year with pneumonia.  Patient is still in atrial fibrillation today.  He is currently asymptomatic with rate control.  No side effects from warfarin.  The onset of A. fib is unclear.  Prior EKG is from 2017 which shows sinus rhythm.  Since patient has no symptoms related to atrial fibrillation there is no indication for rhythm control strategy therefore I will continue rate control and anticoagulation.  Patient risk factors for atrial fibrillation are age and alcohol intake.  I recommended to decrease alcohol intake to 1 drink per day.  Recent echo showed normal biventricular function with no significant valve disease.  TSH is normal. No ZACH symptoms.     I recommended ZIO Patch for 3 days to assess rate control. I did not make any medication changes today. I will reassess him in 6 months.    A total of 30 minutes spent face-toface with greater than 50% of the time spent in counseling and coordinating cares of the issues above.     Please donot hesitate to contact me if you have any questions or concerns. Again, thank you for allowing me to participate in the care of your patient.    Siva GTZ MD  [facility] Division of Cardiology  Pager [phone]

## 2020-09-02 NOTE — LETTER
9/2/2020      RE: Jimi Moreno  [address] [facility] [address] [address]       Dear Colleague,    Thank you for the opportunity to participate in the care of your patient, Jimi Moreno, at the [facility] AT [facility] at [facility]. Please see a copy of my visit note below.    Referring provider: Francia Finnegan NP    HPI: Mr. Jimi Moreno is a 75 year old  male with PMH significant for BPH but otherwise unremarkable.    Patient was recently admitted to [facility] on 7/7/2020 with right lower lobe pneumonia.  He was tested negative for COVID-19.  He was treated for community-acquired pneumonia which he responded well.  He was also found to be in atrial fibrillation with RVR.  Patient was started on diltiazem and warfarin. No prior history of cardiac disease or afib.    Patient is overall feeling well from cardiac standpoint.  He denies chest discomfort, dyspnea, PND, orthopnea, pedal edema, palpitations, lightheadedness, or syncope.  No daytime sleepiness or snoring.  His is retired now and not physically very active.    Patient drinks 3 alcoholic beverages per day.  Lifetime non-smoker.  No history of hypertension, diabetes or hyperlipidemia.      Echocardiogram 7/8/2020 showed normal biventricular function with mild LVH.  He was in atrial fibrillation during the study.  No significant valve disease was noted.    I reviewed EKG in clinic today which showed atrial fibrillation heart rate 82 bpm.  Patient is currently on diltiazem 360 mg once daily, tamsulosin, finasteride and warfarin.    Medications, personal, family, and social history reviewed with patient and revised.    PAST MEDICAL HISTORY:  Past Medical History:   Diagnosis Date     Sinusitis acute      Unspecified essential hypertension      Varicose vein        CURRENT MEDICATIONS:  Current Outpatient Medications   Medication Sig Dispense Refill     diltiazem ER COATED BEADS  (CARDIZEM CD) 360 MG 24 hr capsule Take 1 capsule (360 mg) by mouth daily 30 capsule 2     finasteride (PROSCAR) 5 MG tablet Take 1 tablet (5 mg) by mouth daily 90 tablet 3     tamsulosin (FLOMAX) 0.4 MG capsule Take 2 capsules (0.8 mg) by mouth daily 180 capsule 3     warfarin ANTICOAGULANT (COUMADIN) 5 MG tablet Take 1 tablet (5 mg) by mouth daily Or as advised by provider 30 tablet 2       PAST SURGICAL HISTORY:  Past Surgical History:   Procedure Laterality Date     COLONOSCOPY N/A 11/2/2017    Procedure: COMBINED COLONOSCOPY, SINGLE OR MULTIPLE BIOPSY/POLYPECTOMY BY BIOPSY;;  Surgeon: Sanjay Chang MD;  Location: [facility] OR     COLONOSCOPY WITH CO2 INSUFFLATION N/A 11/2/2017    Procedure: COLONOSCOPY WITH CO2 INSUFFLATION;  COLON SCREEN/ ZOWNIROWYCZ;  Surgeon: Sanjay Chang MD;  Location: [facility] OR     NO HISTORY OF SURGERY         ALLERGIES:     Allergies   Allergen Reactions     Pollen Extract/Tree Extract Other (See Comments)     Oak pollen         FAMILY HISTORY:  Family History   Problem Relation Age of Onset     Cancer No family hx of         no skin cancer         SOCIAL HISTORY:  Social History     Tobacco Use     Smoking status: Never Smoker     Smokeless tobacco: Never Used   Substance Use Topics     Alcohol use: Yes     Comment: 2  drinks daily     Drug use: No       ROS:   Constitutional: No fever, chills, or sweats. Weight stable.   ENT: No visual disturbance, ear ache, epistaxis, sore throat.   Cardiovascular: As per HPI.   Respiratory: No cough, hemoptysis.    GI: No nausea, vomiting, hematemesis, melena, or hematochezia.   : No hematuria.   Integument: Negative.   Psychiatric: Negative.   Hematologic:  No easy bruising, no easy bleeding.  Neuro: Negative.   Endocrinology: No significant heat or cold intolerance   Musculoskeletal: No myalgia.    Exam:  BP (!) 153/83 (BP Location: Left arm, Patient Position: Sitting, Cuff Size: Adult Large)   Pulse 91   Wt 106.1 kg (234 lb)   SpO2  95%   BMI 30.45 kg/m    GENERAL APPEARANCE: alert and no distress  HEENT: no icterus, no central cyanosis  LYMPH/NECK: no adenopathy, no asymmetry, JVP not elevated, no carotid bruits.  RESPIRATORY: lungs clear to auscultation - no rales, rhonchi or wheezes, no use of accessory muscles, no retractions, respirations are unlabored, normal respiratory rate  CARDIOVASCULAR: irregular rhythm, normal S1, S2, no S3 or S4 and no murmur, click or rub, precordium quiet with normal PMI.  GI: soft, non tender  EXTREMITIES:no edema  NEURO: alert, normal speech,and affect  SKIN: no ecchymoses, no rashes     I have reviewed the labs and personally reviewed the imaging below and made my comment in the assessment and plan.    Labs:  CBC RESULTS:   Lab Results   Component Value Date    WBC 4.9 08/11/2020    RBC 5.11 08/11/2020    HGB 15.9 08/11/2020    HCT 46.1 08/11/2020    MCV 90 08/11/2020    MCH 31.1 08/11/2020    MCHC 34.5 08/11/2020    RDW 13.7 08/11/2020     08/11/2020       BMP RESULTS:  Lab Results   Component Value Date     08/11/2020    POTASSIUM 4.4 08/11/2020    CHLORIDE 110 (H) 08/11/2020    CO2 21 08/11/2020    ANIONGAP 8 08/11/2020     (H) 08/11/2020    BUN 12 08/11/2020    CR 1.20 08/11/2020    GFRESTIMATED 58 (L) 08/11/2020    GFRESTBLACK 67 08/11/2020    MELIDA 8.5 08/11/2020        INR RESULTS:  Lab Results   Component Value Date    INR 2.20 (H) 08/28/2020    INR 1.80 (H) 08/21/2020    INR 1.50 (H) 08/14/2020    INR 3.30 (H) 08/07/2020       Echocardiogram 7/8/2020  The rhythm was atrial fibrillation.  Left ventricular systolic function is normal.  The visual ejection fraction is estimated at 60-65%.  The left ventricle is normal in size.  There is mild concentric left ventricular hypertrophy.  The right ventricle is normal in structure, function and size.  There is moderate biatrial enlargement.  Doppler interrogation does not demonstrate significant stenosis or  insufficiency involving cardiac  valves.   No old studies for compariosn.    EKG 7/7/2020 atrial fibrillation with RVR        EKG reviewed in clinic 9/2/2020: Incomplete right bundle branch block, left axis deviation, atrial fibrillation heart rate 82 bpm    Assessment and Plan:   Mr. Jimi Moreno is a 75 year old  male with PMH significant for BPH but otherwise unremarkable.    Persistent atrial fibrillation: Patient was found to be in atrial fibrillation with RVR when he presented to hospital in July of this year with pneumonia.  Patient is still in atrial fibrillation today.  He is currently asymptomatic with rate control.  No side effects from warfarin.  The onset of A. fib is unclear.  Prior EKG is from 2017 which shows sinus rhythm.  Since patient has no symptoms related to atrial fibrillation there is no indication for rhythm control strategy therefore I will continue rate control and anticoagulation.  Patient risk factors for atrial fibrillation are age and alcohol intake.  I recommended to decrease alcohol intake to 1 drink per day.  Recent echo showed normal biventricular function with no significant valve disease.  TSH is normal. No ZACH symptoms.     I recommended ZIO Patch for 3 days to assess rate control. I did not make any medication changes today. I will reassess him in 6 months.    A total of 30 minutes spent face-toface with greater than 50% of the time spent in counseling and coordinating cares of the issues above.     Please donot hesitate to contact me if you have any questions or concerns. Again, thank you for allowing me to participate in the care of your patient.    Siva GTZ MD  [facility] Division of Cardiology  Pager [phone]    Please do not hesitate to contact me if you have any questions/concerns.     Sincerely,     Siva Gtz MD

## 2020-09-02 NOTE — PROGRESS NOTES
3 days days ZioXT applied to patient today. Instructions on use and removal given and mail back instructions discussed with patient. All questions answered. Zio Device registered with [employer] via internet.   Device number: [id number], dx. I48.19.    Claribel Dugan L.P.N.

## 2020-09-02 NOTE — PATIENT INSTRUCTIONS
Thank you for coming to the [facility] @ [facility]; please note the following instructions:    1.  We have applied a heart monitor (ZIO Patch) for you to wear for 3 days.  You may remove the heart monitor on 09/05/20 at 10:00.  Please see the instruction booklet for further information, as well as instructions for removal of the heart monitor and return mailing directions.  If you should have questions regarding your monitor, please call [employer] at [phone].  We will contact you with your results (please see result notification details at bottom of summary).    *PLEASE DO NOT SHOWER OR INDUCE EXCESSIVE SWEATING IN THE FIRST 24 HOURS OF WEARING*    2.Dr. Siva Devlin has requested you to follow up in 6 month; please see following pages for appointment detail information.        If you have any questions regarding your visit please contact your care team:     Cardiology  Telephone Number   Loulou SALVADOR., RN  Meghna NIETO,RN  Arelis DAWSON, PARKER SNYDER, MA  Claribel ACOSTA, LPN   [phone]   (select option 1)    *After hours: [phone]     For scheduling appts:     [phone] or    [phone] (select option 1)    *After hours: [phone]     For the Device Clinic (Pacemakers and ICD's)  RN's :  Elizabeth Davison   During business hours: [phone]    *After business hours:  [phone] (select option 4)      Normal test result notifications will be released via [employer] or mailed within 7 business days.  All other test results, will be communicated via telephone once reviewed by your cardiologist.    If you need a medication refill please contact your pharmacy.  Please allow 3 business days for your refill to be completed.    As always, thank you for trusting us with your health care needs!

## 2020-09-02 NOTE — NURSING NOTE
Ekg. Test procedure explained to patient .Ekg.test performed today per Provider order.Then Ekg. Result relayed  to provider for review.  Claribel Dugan L.P.N.

## 2020-09-02 NOTE — NURSING NOTE
"Chief Complaint   Patient presents with     Atrial Fib     Adm 7/7/20- SOB, tachycardia, exposed to + COVID grand daughter.Per patient sob.,occationally       Initial BP (!) 153/83 (BP Location: Left arm, Patient Position: Sitting, Cuff Size: Adult Large)   Pulse 91   Wt 106.1 kg (234 lb)   SpO2 95%   BMI 30.45 kg/m   Estimated body mass index is 30.45 kg/m  as calculated from the following:    Height as of 8/11/20: 1.867 m (6' 1.5\").    Weight as of this encounter: 106.1 kg (234 lb)..  BP completed using cuff size: large    Claribel Dugan L.P.N.    "

## 2020-09-11 ENCOUNTER — ANTICOAGULATION THERAPY VISIT (OUTPATIENT)
Dept: NURSING | Facility: CLINIC | Age: 75
End: 2020-09-11

## 2020-09-11 DIAGNOSIS — I48.91 ATRIAL FIBRILLATION WITH RVR (H): ICD-10-CM

## 2020-09-11 LAB
CAPILLARY BLOOD COLLECTION: NORMAL
INR PPP: 2.5 (ref 0.86–1.14)

## 2020-09-11 PROCEDURE — 85610 PROTHROMBIN TIME: CPT | Performed by: FAMILY MEDICINE

## 2020-09-11 PROCEDURE — 36416 COLLJ CAPILLARY BLOOD SPEC: CPT | Performed by: FAMILY MEDICINE

## 2020-09-11 NOTE — PROGRESS NOTES
ANTICOAGULATION FOLLOW-UP CLINIC VISIT    Patient Name:  Jimi Moreno  Date:  2020  Contact Type:  Telephone    SUBJECTIVE:         OBJECTIVE    Recent labs: (last 7 days)     20  0929   INR 2.50*       ASSESSMENT / PLAN  INR assessment THER    Recheck INR In: 2 WEEKS    INR Location Clinic      Anticoagulation Summary  As of 2020    INR goal:   2.0-3.0   TTR:   55.0 % (1.6 mo)   INR used for dosin.50 (2020)   Warfarin maintenance plan:   5 mg (5 mg x 1) every Sun, Tue, Thu; 7.5 mg (5 mg x 1.5) all other days   Full warfarin instructions:   5 mg every Sun, Tue, Thu; 7.5 mg all other days   Weekly warfarin total:   45 mg   No change documented:   Jyotsna Alcantara RN   Plan last modified:   Jyotsna Alcantara RN (2020)   Next INR check:   2020   Target end date:   Indefinite    Indications    Atrial fibrillation with RVR (H) [I48.91]             Anticoagulation Episode Summary     INR check location:       Preferred lab:       Send INR reminders to:   PAMELA GONSALEZ    Comments:         Anticoagulation Care Providers     Provider Role Specialty Phone number    Francia Finnegan, NP Referring Nurse Practitioner - Family [phone]            See the Encounter Report to view Anticoagulation Flowsheet and Dosing Calendar (Go to Encounters tab in chart review, and find the Anticoagulation Therapy Visit)        Jyotsna Alcantara RN

## 2020-09-25 ENCOUNTER — ANTICOAGULATION THERAPY VISIT (OUTPATIENT)
Dept: NURSING | Facility: CLINIC | Age: 75
End: 2020-09-25

## 2020-09-25 DIAGNOSIS — I48.91 ATRIAL FIBRILLATION WITH RVR (H): ICD-10-CM

## 2020-09-25 LAB
CAPILLARY BLOOD COLLECTION: NORMAL
INR PPP: 2.2 (ref 0.86–1.14)

## 2020-09-25 PROCEDURE — 85610 PROTHROMBIN TIME: CPT | Performed by: FAMILY MEDICINE

## 2020-09-25 PROCEDURE — 36416 COLLJ CAPILLARY BLOOD SPEC: CPT | Performed by: FAMILY MEDICINE

## 2020-09-25 RX ORDER — WARFARIN SODIUM 5 MG/1
5 TABLET ORAL DAILY
Qty: 120 TABLET | Refills: 0 | Status: SHIPPED | OUTPATIENT
Start: 2020-09-25 | End: 2020-12-11

## 2020-10-16 ENCOUNTER — ANTICOAGULATION THERAPY VISIT (OUTPATIENT)
Dept: NURSING | Facility: CLINIC | Age: 75
End: 2020-10-16

## 2020-10-16 DIAGNOSIS — I48.91 ATRIAL FIBRILLATION WITH RVR (H): ICD-10-CM

## 2020-10-16 LAB
CAPILLARY BLOOD COLLECTION: NORMAL
INR PPP: 2.3 (ref 0.86–1.14)

## 2020-10-16 PROCEDURE — 36416 COLLJ CAPILLARY BLOOD SPEC: CPT | Performed by: NURSE PRACTITIONER

## 2020-10-16 PROCEDURE — 85610 PROTHROMBIN TIME: CPT | Performed by: NURSE PRACTITIONER

## 2020-10-16 NOTE — PROGRESS NOTES
ANTICOAGULATION FOLLOW-UP CLINIC VISIT    Patient Name:  Jimi Moreno  Date:  10/16/2020  Contact Type:  Telephone    SUBJECTIVE:  Patient Findings     Comments:    Assessed for S/S bleeding, clotting, medication, diet, health, activity and alcohol changes          Clinical Outcomes     Negatives:  Major bleeding event, Thromboembolic event, Anticoagulation-related hospital admission, Anticoagulation-related ED visit, Anticoagulation-related fatality    Comments:    Assessed for S/S bleeding, clotting, medication, diet, health, activity and alcohol changes             OBJECTIVE    Recent labs: (last 7 days)     10/16/20  0829   INR 2.30*       ASSESSMENT / PLAN  INR assessment THER    Recheck INR In: 4 WEEKS    INR Location Clinic      Anticoagulation Summary  As of 10/16/2020    INR goal:  2.0-3.0   TTR:  73.6 % (2.8 mo)   INR used for dosin.30 (10/16/2020)   Warfarin maintenance plan:  5 mg (5 mg x 1) every Sun, Tue, Thu; 7.5 mg (5 mg x 1.5) all other days   Full warfarin instructions:  5 mg every Sun, Tue, Thu; 7.5 mg all other days   Weekly warfarin total:  45 mg   No change documented:  Jyotsna Alcantara RN   Plan last modified:  Jyotsna Alcantara RN (2020)   Next INR check:  2020   Priority:  Maintenance   Target end date:  Indefinite    Indications    Atrial fibrillation with RVR (H) [I48.91]             Anticoagulation Episode Summary     INR check location:      Preferred lab:      Send INR reminders to:  PAMELA GONSALEZ    Comments:        Anticoagulation Care Providers     Provider Role Specialty Phone number    Francia Finnegan, NP Referring Nurse Practitioner - Family [phone]            See the Encounter Report to view Anticoagulation Flowsheet and Dosing Calendar (Go to Encounters tab in chart review, and find the Anticoagulation Therapy Visit)    Dosage adjustment made based on physician directed care plan.    Jyotsna Alcantara RN

## 2020-10-21 ENCOUNTER — TELEPHONE (OUTPATIENT)
Dept: FAMILY MEDICINE | Facility: CLINIC | Age: 75
End: 2020-10-21

## 2020-10-21 DIAGNOSIS — I10 ESSENTIAL HYPERTENSION WITH GOAL BLOOD PRESSURE LESS THAN 140/90: Primary | ICD-10-CM

## 2020-10-21 RX ORDER — LISINOPRIL 40 MG/1
40 TABLET ORAL DAILY
Qty: 90 TABLET | Refills: 1 | Status: SHIPPED | OUTPATIENT
Start: 2020-10-21 | End: 2021-10-27

## 2020-10-21 NOTE — TELEPHONE ENCOUNTER
I sent in prescription for the lisinopril 40 mg daily    Please inform patient and confirm that he has been taking this med    Rodolfo Pedersen MD

## 2020-10-21 NOTE — TELEPHONE ENCOUNTER
Attempt # 1  Called patient at home [phone]  Was call answered?  No answer, left message to call nurse line at [phone]          Radha Uriarte RN  [facility]

## 2020-10-21 NOTE — TELEPHONE ENCOUNTER
Refill Request (lisinopril (ZESTRIL) 40 MG tablet (Discontinued))        Looks like this medication has been discontinued.    Damari RandhawaRN,BSN  [facility]

## 2020-10-22 NOTE — TELEPHONE ENCOUNTER
Attempt # 2  Called patient at home [phone]  Was call answered?  Yes, patient does not take lisinopril because is now on cardizem and warfarin from a hospital stay at [facility].  Called [facility] spoke to pharmacist Chary who removed the lisinopril from patient's profile.          Radha Uriarte RN  [facility]

## 2020-11-13 ENCOUNTER — ANTICOAGULATION THERAPY VISIT (OUTPATIENT)
Dept: NURSING | Facility: CLINIC | Age: 75
End: 2020-11-13

## 2020-11-13 DIAGNOSIS — I48.91 ATRIAL FIBRILLATION WITH RVR (H): ICD-10-CM

## 2020-11-13 LAB
CAPILLARY BLOOD COLLECTION: NORMAL
INR PPP: 2.6 (ref 0.86–1.14)

## 2020-11-13 PROCEDURE — 36416 COLLJ CAPILLARY BLOOD SPEC: CPT | Performed by: NURSE PRACTITIONER

## 2020-11-13 PROCEDURE — 85610 PROTHROMBIN TIME: CPT | Performed by: NURSE PRACTITIONER

## 2020-11-13 NOTE — PROGRESS NOTES
ANTICOAGULATION MANAGEMENT     Patient Name:  Jimi Moreno  Date:  2020    ASSESSMENT /SUBJECTIVE:    Today's INR result of 2.6 is therapeutic. Goal INR of 2.0-3.0      Warfarin dose taken: Warfarin taken as instructed    Diet: No new diet changes affecting INR    Medication changes/ interactions: No new medications/supplements affecting INR    Previous INR: Therapeutic     S/S of bleeding or thromboembolism: No    New injury or illness: No    Upcoming surgery, procedure or cardioversion: No    Additional findings: None      PLAN:    Telephone call with Jimi regarding INR result and instructed:     Warfarin Dosing Instructions: Continue your current warfarin dose 5 mg Sun/Tue/Thu and 7.5 mg ROW    Instructed patient to follow up no later than: 4 weeks  Lab visit scheduled    Education provided: Target INR goal and significance of current INR result      Jimi verbalizes understanding and agrees to warfarin dosing plan.    Instructed to call the Anticoagulation Clinic for any changes, questions or concerns. (#[phone])        Aby Jung RN      OBJECTIVE:  Recent labs: (last 7 days)     20  0823   INR 2.60*         No question data found.  Anticoagulation Summary  As of 2020    INR goal:  2.0-3.0   TTR:  80.2 % (3.7 mo)   INR used for dosin.60 (2020)   Warfarin maintenance plan:  5 mg (5 mg x 1) every Sun, Tue, Thu; 7.5 mg (5 mg x 1.5) all other days   Full warfarin instructions:  5 mg every Sun, Tue, Thu; 7.5 mg all other days   Weekly warfarin total:  45 mg   No change documented:  Aby Jung RN   Plan last modified:  Jyotsna Alcantara RN (2020)   Next INR check:  2020   Priority:  Maintenance   Target end date:  Indefinite    Indications    Atrial fibrillation with RVR (H) [I48.91]             Anticoagulation Episode Summary     INR check location:      Preferred lab:      Send INR reminders to:  PAMELA GONSALEZ    Comments:        Anticoagulation Care  Providers     Provider Role Specialty Phone number    Francia Finnegan, NP Referring Nurse Practitioner - Family [phone]

## 2020-12-11 ENCOUNTER — ANTICOAGULATION THERAPY VISIT (OUTPATIENT)
Dept: NURSING | Facility: CLINIC | Age: 75
End: 2020-12-11

## 2020-12-11 DIAGNOSIS — I48.91 ATRIAL FIBRILLATION WITH RVR (H): ICD-10-CM

## 2020-12-11 LAB
CAPILLARY BLOOD COLLECTION: NORMAL
INR PPP: 2.5 (ref 0.86–1.14)

## 2020-12-11 PROCEDURE — 36416 COLLJ CAPILLARY BLOOD SPEC: CPT | Performed by: NURSE PRACTITIONER

## 2020-12-11 PROCEDURE — 85610 PROTHROMBIN TIME: CPT | Performed by: NURSE PRACTITIONER

## 2020-12-11 RX ORDER — WARFARIN SODIUM 5 MG/1
5 TABLET ORAL DAILY
Qty: 120 TABLET | Refills: 1 | Status: SHIPPED | OUTPATIENT
Start: 2020-12-11 | End: 2021-04-02

## 2020-12-11 NOTE — PROGRESS NOTES
ANTICOAGULATION FOLLOW-UP CLINIC VISIT    Patient Name:  Jimi Moreno  Date:  2020  Contact Type:  Telephone    SUBJECTIVE:  Patient Findings     Comments:    Assessed for S/S bleeding, clotting, medication, diet, health, activity and alcohol changes          Clinical Outcomes     Negatives:  Major bleeding event, Thromboembolic event, Anticoagulation-related hospital admission, Anticoagulation-related ED visit, Anticoagulation-related fatality    Comments:    Assessed for S/S bleeding, clotting, medication, diet, health, activity and alcohol changes             OBJECTIVE    Recent labs: (last 7 days)     20  0842   INR 2.50*       ASSESSMENT / PLAN  INR assessment THER    Recheck INR In: 4 WEEKS    INR Location Clinic      Anticoagulation Summary  As of 2020    INR goal:  2.0-3.0   TTR:  84.2 % (4.7 mo)   INR used for dosin.50 (2020)   Warfarin maintenance plan:  5 mg (5 mg x 1) every Sun, Tue, Thu; 7.5 mg (5 mg x 1.5) all other days   Full warfarin instructions:  5 mg every Sun, Tue, Thu; 7.5 mg all other days   Weekly warfarin total:  45 mg   No change documented:  Jyotsna Alcantara RN   Plan last modified:  Jyotsna Alcantara RN (2020)   Next INR check:  2021   Priority:  Maintenance   Target end date:  Indefinite    Indications    Atrial fibrillation with RVR (H) [I48.91]             Anticoagulation Episode Summary     INR check location:      Preferred lab:      Send INR reminders to:  PAMELA GONSALEZ    Comments:        Anticoagulation Care Providers     Provider Role Specialty Phone number    Francia Finnegan, NP Referring Nurse Practitioner - Family [phone]            See the Encounter Report to view Anticoagulation Flowsheet and Dosing Calendar (Go to Encounters tab in chart review, and find the Anticoagulation Therapy Visit)        Jyotsna Alcantara RN

## 2021-01-08 ENCOUNTER — ANTICOAGULATION THERAPY VISIT (OUTPATIENT)
Dept: NURSING | Facility: CLINIC | Age: 76
End: 2021-01-08

## 2021-01-08 DIAGNOSIS — I48.91 ATRIAL FIBRILLATION WITH RVR (H): ICD-10-CM

## 2021-01-08 LAB
CAPILLARY BLOOD COLLECTION: NORMAL
INR PPP: 2 (ref 0.86–1.14)

## 2021-01-08 PROCEDURE — 36416 COLLJ CAPILLARY BLOOD SPEC: CPT | Performed by: NURSE PRACTITIONER

## 2021-01-08 PROCEDURE — 85610 PROTHROMBIN TIME: CPT | Performed by: NURSE PRACTITIONER

## 2021-01-08 NOTE — PROGRESS NOTES
ANTICOAGULATION MANAGEMENT     Patient Name:  Jimi Moreno  Date:  2021    ASSESSMENT /SUBJECTIVE:    Today's INR result of 2.0 is therapeutic. Goal INR of 2.0-3.0      Warfarin dose taken: Warfarin taken as instructed    Diet: Increased greens/vitamin K in diet; plans to resume previous intake    Medication changes/ interactions: No new medications/supplements affecting INR    Previous INR: Therapeutic     S/S of bleeding or thromboembolism: No    New injury or illness: No    Upcoming surgery, procedure or cardioversion: No    Additional findings: None      PLAN:    Telephone call with Jimi regarding INR result and instructed:     Warfarin Dosing Instructions: Continue your current warfarin dose 5 mg Sun/Tue/Thu and 7.5 mg ROW    Instructed patient to follow up no later than: 4 weeks  Lab visit scheduled    Education provided: Impact of vitamin K foods on INR and Target INR goal and significance of current INR result      Jimi verbalizes understanding and agrees to warfarin dosing plan.    Instructed to call the Anticoagulation Clinic for any changes, questions or concerns. (#[phone])        Aby Jung RN      OBJECTIVE:  Recent labs: (last 7 days)     21  0833   INR 2.00*         No question data found.  Anticoagulation Summary  As of 2021    INR goal:  2.0-3.0   TTR:  86.8 % (5.6 mo)   INR used for dosin.00 (2021)   Warfarin maintenance plan:  5 mg (5 mg x 1) every Sun, Tue, Thu; 7.5 mg (5 mg x 1.5) all other days   Full warfarin instructions:  5 mg every Sun, Tue, Thu; 7.5 mg all other days   Weekly warfarin total:  45 mg   No change documented:  Aby Jung RN   Plan last modified:  Jyotsna Alcantara RN (2020)   Next INR check:  2021   Priority:  Maintenance   Target end date:  Indefinite    Indications    Atrial fibrillation with RVR (H) [I48.91]             Anticoagulation Episode Summary     INR check location:      Preferred lab:      Send INR reminders to:   PAMELA GONSALEZ    Comments:        Anticoagulation Care Providers     Provider Role Specialty Phone number    Francia Finnegan, NP Referring Nurse Practitioner - Family [phone]

## 2021-02-05 ENCOUNTER — ANTICOAGULATION THERAPY VISIT (OUTPATIENT)
Dept: NURSING | Facility: CLINIC | Age: 76
End: 2021-02-05

## 2021-02-05 DIAGNOSIS — I48.91 ATRIAL FIBRILLATION WITH RVR (H): ICD-10-CM

## 2021-02-05 LAB
CAPILLARY BLOOD COLLECTION: NORMAL
INR PPP: 2.2 (ref 0.86–1.14)

## 2021-02-05 PROCEDURE — 85610 PROTHROMBIN TIME: CPT | Performed by: NURSE PRACTITIONER

## 2021-02-05 PROCEDURE — 36416 COLLJ CAPILLARY BLOOD SPEC: CPT | Performed by: NURSE PRACTITIONER

## 2021-02-05 NOTE — PROGRESS NOTES
ANTICOAGULATION MANAGEMENT     Patient Name:  Jimi Moreno  Date:  2021    ASSESSMENT /SUBJECTIVE:    Today's INR result of 2.2 is therapeutic. Goal INR of 2.0-3.0      Warfarin dose taken: Warfarin taken as instructed    Diet: No new diet changes affecting INR    Medication changes/ interactions: No new medications/supplements affecting INR    Previous INR: Therapeutic     S/S of bleeding or thromboembolism: No    New injury or illness: No    Upcoming surgery, procedure or cardioversion: No    Additional findings: None      PLAN:    Telephone call with Jimi regarding INR result and instructed:     Warfarin Dosing Instructions: Continue your current warfarin dose 5 mg Sun/Tue/Thu and 7.5 mg ROW    Instructed patient to follow up no later than: 4 weeks  Lab visit scheduled    Education provided: Target INR goal and significance of current INR result      Jimi verbalizes understanding and agrees to warfarin dosing plan.    Instructed to call the Anticoagulation Clinic for any changes, questions or concerns. (#[phone])        Aby Jung RN      OBJECTIVE:  Recent labs: (last 7 days)     21  0822   INR 2.20*         No question data found.  Anticoagulation Summary  As of 2021    INR goal:  2.0-3.0   TTR:  88.7 % (6.5 mo)   INR used for dosin.20 (2021)   Warfarin maintenance plan:  5 mg (5 mg x 1) every Sun, Tue, Thu; 7.5 mg (5 mg x 1.5) all other days   Full warfarin instructions:  5 mg every Sun, Tue, Thu; 7.5 mg all other days   Weekly warfarin total:  45 mg   No change documented:  Aby Jung RN   Plan last modified:  Jyotsna Alcantara RN (2020)   Next INR check:  3/5/2021   Priority:  Maintenance   Target end date:  Indefinite    Indications    Atrial fibrillation with RVR (H) [I48.91]             Anticoagulation Episode Summary     INR check location:      Preferred lab:      Send INR reminders to:  PAMELA GONSALEZ    Comments:        Anticoagulation Care  Providers     Provider Role Specialty Phone number    Francia Finnegan, NP Referring Nurse Practitioner - Family [phone]

## 2021-03-03 ENCOUNTER — OFFICE VISIT (OUTPATIENT)
Dept: CARDIOLOGY | Facility: CLINIC | Age: 76
End: 2021-03-03
Payer: COMMERCIAL

## 2021-03-03 VITALS
BODY MASS INDEX: 31.63 KG/M2 | SYSTOLIC BLOOD PRESSURE: 149 MMHG | HEART RATE: 73 BPM | DIASTOLIC BLOOD PRESSURE: 92 MMHG | WEIGHT: 243 LBS | OXYGEN SATURATION: 96 %

## 2021-03-03 DIAGNOSIS — I10 ESSENTIAL HYPERTENSION WITH GOAL BLOOD PRESSURE LESS THAN 140/90: ICD-10-CM

## 2021-03-03 DIAGNOSIS — I48.21 PERMANENT ATRIAL FIBRILLATION (H): Primary | ICD-10-CM

## 2021-03-03 DIAGNOSIS — I10 BENIGN ESSENTIAL HYPERTENSION: ICD-10-CM

## 2021-03-03 PROCEDURE — 99215 OFFICE O/P EST HI 40 MIN: CPT | Performed by: INTERNAL MEDICINE

## 2021-03-03 PROCEDURE — 93000 ELECTROCARDIOGRAM COMPLETE: CPT | Performed by: INTERNAL MEDICINE

## 2021-03-03 RX ORDER — METOPROLOL SUCCINATE 50 MG/1
50 TABLET, EXTENDED RELEASE ORAL DAILY
Qty: 90 TABLET | Refills: 3 | Status: SHIPPED | OUTPATIENT
Start: 2021-03-03 | End: 2021-10-27

## 2021-03-03 NOTE — PROGRESS NOTES
Referring provider: Francia Finnegan NP    HPI: Mr. Jimi Moreno is a 75 year old  male with PMH significant for BPH but otherwise unremarkable.    Patient was recently admitted to [facility] on 7/7/2020 with right lower lobe pneumonia.  He was tested negative for COVID-19.  He was treated for community-acquired pneumonia which he responded well.  He was also found to be in atrial fibrillation with RVR.  Patient was started on diltiazem and warfarin. No prior history of cardiac disease or afib.    Patient is overall feeling well from cardiac standpoint.  He denies chest discomfort, dyspnea, PND, orthopnea, pedal edema, palpitations, lightheadedness, or syncope.  No daytime sleepiness or snoring.  His is retired now and not physically very active.    Patient drinks 3 alcoholic beverages per day.  Lifetime non-smoker.  No history of hypertension, diabetes or hyperlipidemia.      Echocardiogram 7/8/2020 showed normal biventricular function with mild LVH.  He was in atrial fibrillation during the study.  No significant valve disease was noted.    I reviewed EKG in clinic today which showed atrial fibrillation heart rate 82 bpm.  Patient is currently on diltiazem 360 mg once daily, tamsulosin, finasteride and warfarin.    Medications, personal, family, and social history reviewed with patient and revised.    Interval history 3/3/2021:  Patient returns for follow-up.  As you know he was diagnosed with atrial fibrillation less than a year ago when he was admitted for pneumonia.  Since he was asymptomatic from A. fib rate control strategy was pursued.  I did a Zio patch on him a year ago which showed adequate rate control with diltiazem 360 mg.  When he was started on diltiazem his blood pressure medication (lisinopril) was discontinued.  He does not monitor his blood pressure at home very regularly.  He reports his blood pressure is usually 127/77 mmHg at home.  He continues to drink alcohol excessively (4 drinks per day).   He is not physically very active.  Denies chest pain, palpitations, dizziness, syncope.  Sometimes feels short of breath with exertion.  He has varicose veins on the right side with lower extremity edema on the right side and wears compression stockings.  No side effects with warfarin.    PAST MEDICAL HISTORY:  Past Medical History:   Diagnosis Date     Sinusitis acute      Unspecified essential hypertension      Varicose vein        CURRENT MEDICATIONS:  Current Outpatient Medications   Medication Sig Dispense Refill     diltiazem ER COATED BEADS (CARDIZEM CD) 360 MG 24 hr capsule Take 1 capsule (360 mg) by mouth daily 90 capsule 3     finasteride (PROSCAR) 5 MG tablet Take 1 tablet (5 mg) by mouth daily 90 tablet 3     lisinopril (ZESTRIL) 40 MG tablet Take 1 tablet (40 mg) by mouth daily 90 tablet 1     tamsulosin (FLOMAX) 0.4 MG capsule Take 2 capsules (0.8 mg) by mouth daily 180 capsule 3     warfarin ANTICOAGULANT (COUMADIN) 5 MG tablet Take 1 tablet (5 mg) by mouth daily Take 7.5 mg Mon wed fri and sat and 5 mg Sunday Tue and thur unless directed differently by anticoag 120 tablet 1       PAST SURGICAL HISTORY:  Past Surgical History:   Procedure Laterality Date     COLONOSCOPY N/A 11/2/2017    Procedure: COMBINED COLONOSCOPY, SINGLE OR MULTIPLE BIOPSY/POLYPECTOMY BY BIOPSY;;  Surgeon: Sanjay Chang MD;  Location: [facility] OR     COLONOSCOPY WITH CO2 INSUFFLATION N/A 11/2/2017    Procedure: COLONOSCOPY WITH CO2 INSUFFLATION;  COLON SCREEN/ ZOWNIROWYCZ;  Surgeon: Sanjay Chang MD;  Location: [facility] OR     NO HISTORY OF SURGERY         ALLERGIES:     Allergies   Allergen Reactions     Pollen Extract/Tree Extract Other (See Comments)     Oak pollen         FAMILY HISTORY:  Family History   Problem Relation Age of Onset     Cancer No family hx of         no skin cancer         SOCIAL HISTORY:  Social History     Tobacco Use     Smoking status: Never Smoker     Smokeless tobacco: Never Used   Substance  Use Topics     Alcohol use: Yes     Comment: 2  drinks daily     Drug use: No       ROS:   Constitutional: No fever, chills, or sweats. Weight stable.   ENT: No visual disturbance.  Cardiovascular: As per HPI.   Respiratory: No cough, hemoptysis.    GI: No nausea, vomiting, hematemesis, melena, or hematochezia.   : No hematuria.   Integument: Negative.   Hematologic:  No easy bruising, no easy bleeding.  Neuro: Negative.   Musculoskeletal: No myalgia.    Exam:  BP (!) 177/105 (BP Location: Right arm, Patient Position: Chair, Cuff Size: Adult Regular)   Pulse 67   Wt 110.2 kg (243 lb)   SpO2 96%   BMI 31.63 kg/m    GENERAL APPEARANCE: alert and no distress  HEENT: no icterus, no central cyanosis  LYMPH/NECK: no adenopathy, no asymmetry, JVP not elevated, no carotid bruits.  RESPIRATORY: lungs clear to auscultation - no rales, rhonchi or wheezes, no use of accessory muscles, no retractions, respirations are unlabored, normal respiratory rate  CARDIOVASCULAR: irregular rhythm, normal S1, S2, no S3 or S4 and no murmur, click or rub, precordium quiet with normal PMI.  GI: soft, non tender  EXTREMITIES:no edema  NEURO: alert, normal speech,and affect  SKIN: Brownish skin discoloration below the right knee, varicose veins on the right side.     I have reviewed the labs and personally reviewed the EKG in clinic today.    Labs:  CBC RESULTS:   Lab Results   Component Value Date    WBC 4.9 08/11/2020    RBC 5.11 08/11/2020    HGB 15.9 08/11/2020    HCT 46.1 08/11/2020    MCV 90 08/11/2020    MCH 31.1 08/11/2020    MCHC 34.5 08/11/2020    RDW 13.7 08/11/2020     08/11/2020       BMP RESULTS:  Lab Results   Component Value Date     08/11/2020    POTASSIUM 4.4 08/11/2020    CHLORIDE 110 (H) 08/11/2020    CO2 21 08/11/2020    ANIONGAP 8 08/11/2020     (H) 08/11/2020    BUN 12 08/11/2020    CR 1.20 08/11/2020    GFRESTIMATED 58 (L) 08/11/2020    GFRESTBLACK 67 08/11/2020    MELIDA 8.5 08/11/2020        INR  RESULTS:  Lab Results   Component Value Date    INR 2.20 (H) 02/05/2021    INR 2.00 (H) 01/08/2021    INR 2.50 (H) 12/11/2020    INR 2.60 (H) 11/13/2020       Echocardiogram 7/8/2020  The rhythm was atrial fibrillation.  Left ventricular systolic function is normal.  The visual ejection fraction is estimated at 60-65%.  The left ventricle is normal in size.  There is mild concentric left ventricular hypertrophy.  The right ventricle is normal in structure, function and size.  There is moderate biatrial enlargement.  Doppler interrogation does not demonstrate significant stenosis or  insufficiency involving cardiac valves.   No old studies for compariosn.    EKG 7/7/2020 atrial fibrillation with RVR      EKG reviewed in clinic 9/2/2020: Incomplete right bundle branch block, left axis deviation, atrial fibrillation heart rate 82 bpm    Assessment and Plan:   Mr. Jimi Moreno is a 76 year old  male with PMH significant for hypertension and permanent atrial fibrillation on rate control and warfarin is being seen today for follow-up.      He reports overall doing well.  No concerning cardiac symptoms.      #Hypertension   -Reports well-controlled blood pressure at home   -Blood pressure high in clinic   -Continue diltiazem 360 mg for hypertension and rate control of A. fib   -Monitor blood pressure at home and report to us in a week     #Permanent atrial fibrillation   -EKG reviewed in clinic today shows atrial fibrillation heart rate 110 bpm (patient denies palpitations)  -Start metoprolol 50 mg daily for rate control  -Continue warfarin    #Excessive alcohol use  -Drinks 4 alcoholic beverages a day.  -Counseled against alcohol    Medication change today:  Start metoprolol 50 mg/day to improve rate control of A. fib  Continue all other medications.    Return to clinic 1 year.    Total time spent 40 minutes including face-to-face clinic visit, review of labs/imaging and medical documentation.     Please donot hesitate  to contact me if you have any questions or concerns. Again, thank you for allowing me to participate in the care of your patient.    Siva GTZ MD  [facility] Division of Cardiology  Pager [phone]

## 2021-03-03 NOTE — NURSING NOTE
"Chief Complaint   Patient presents with     RECHECK     6 month follow up. Pt reports some CHANEL.        Initial BP (!) 177/105 (BP Location: Right arm, Patient Position: Chair, Cuff Size: Adult Regular)   Pulse 67   Wt 110.2 kg (243 lb)   SpO2 96%   BMI 31.63 kg/m   Estimated body mass index is 31.63 kg/m  as calculated from the following:    Height as of 8/11/20: 1.867 m (6' 1.5\").    Weight as of this encounter: 110.2 kg (243 lb)..  BP completed using cuff size: regular    Carole Castellanos MA  "

## 2021-03-03 NOTE — LETTER
3/3/2021      RE: Jimi Moreno  [address] [id number]       Dear Colleague,    Thank you for the opportunity to participate in the care of your patient, Jimi Moreno, at the [facility]. Please see a copy of my visit note below.    Referring provider: Francia Finnegan NP    HPI: Mr. Jimi Moreno is a 75 year old  male with PMH significant for BPH but otherwise unremarkable.    Patient was recently admitted to [facility] on 7/7/2020 with right lower lobe pneumonia.  He was tested negative for COVID-19.  He was treated for community-acquired pneumonia which he responded well.  He was also found to be in atrial fibrillation with RVR.  Patient was started on diltiazem and warfarin. No prior history of cardiac disease or afib.    Patient is overall feeling well from cardiac standpoint.  He denies chest discomfort, dyspnea, PND, orthopnea, pedal edema, palpitations, lightheadedness, or syncope.  No daytime sleepiness or snoring.  His is retired now and not physically very active.    Patient drinks 3 alcoholic beverages per day.  Lifetime non-smoker.  No history of hypertension, diabetes or hyperlipidemia.      Echocardiogram 7/8/2020 showed normal biventricular function with mild LVH.  He was in atrial fibrillation during the study.  No significant valve disease was noted.    I reviewed EKG in clinic today which showed atrial fibrillation heart rate 82 bpm.  Patient is currently on diltiazem 360 mg once daily, tamsulosin, finasteride and warfarin.    Medications, personal, family, and social history reviewed with patient and revised.    Interval history 3/3/2021:  Patient returns for follow-up.  As you know he was diagnosed with atrial fibrillation less than a year ago when he was admitted for pneumonia.  Since he was asymptomatic from A. fib rate control strategy was pursued.  I did a Zio patch on him a year  ago which showed adequate rate control with diltiazem 360 mg.  When he was started on diltiazem his blood pressure medication (lisinopril) was discontinued.  He does not monitor his blood pressure at home very regularly.  He reports his blood pressure is usually 127/77 mmHg at home.  He continues to drink alcohol excessively (4 drinks per day).  He is not physically very active.  Denies chest pain, palpitations, dizziness, syncope.  Sometimes feels short of breath with exertion.  He has varicose veins on the right side with lower extremity edema on the right side and wears compression stockings.  No side effects with warfarin.    PAST MEDICAL HISTORY:  Past Medical History:   Diagnosis Date     Sinusitis acute      Unspecified essential hypertension      Varicose vein        CURRENT MEDICATIONS:  Current Outpatient Medications   Medication Sig Dispense Refill     diltiazem ER COATED BEADS (CARDIZEM CD) 360 MG 24 hr capsule Take 1 capsule (360 mg) by mouth daily 90 capsule 3     finasteride (PROSCAR) 5 MG tablet Take 1 tablet (5 mg) by mouth daily 90 tablet 3     lisinopril (ZESTRIL) 40 MG tablet Take 1 tablet (40 mg) by mouth daily 90 tablet 1     tamsulosin (FLOMAX) 0.4 MG capsule Take 2 capsules (0.8 mg) by mouth daily 180 capsule 3     warfarin ANTICOAGULANT (COUMADIN) 5 MG tablet Take 1 tablet (5 mg) by mouth daily Take 7.5 mg Mon wed fri and sat and 5 mg Sunday Tue and thur unless directed differently by anticoag 120 tablet 1       PAST SURGICAL HISTORY:  Past Surgical History:   Procedure Laterality Date     COLONOSCOPY N/A 11/2/2017    Procedure: COMBINED COLONOSCOPY, SINGLE OR MULTIPLE BIOPSY/POLYPECTOMY BY BIOPSY;;  Surgeon: Sanjay Chang MD;  Location: [facility] OR     COLONOSCOPY WITH CO2 INSUFFLATION N/A 11/2/2017    Procedure: COLONOSCOPY WITH CO2 INSUFFLATION;  COLON SCREEN/ ZOWNIROWYCZ;  Surgeon: Sanjay Chang MD;  Location: [facility] OR     NO HISTORY OF SURGERY         ALLERGIES:     Allergies    Allergen Reactions     Pollen Extract/Tree Extract Other (See Comments)     Oak pollen         FAMILY HISTORY:  Family History   Problem Relation Age of Onset     Cancer No family hx of         no skin cancer         SOCIAL HISTORY:  Social History     Tobacco Use     Smoking status: Never Smoker     Smokeless tobacco: Never Used   Substance Use Topics     Alcohol use: Yes     Comment: 2  drinks daily     Drug use: No       ROS:   Constitutional: No fever, chills, or sweats. Weight stable.   ENT: No visual disturbance.  Cardiovascular: As per HPI.   Respiratory: No cough, hemoptysis.    GI: No nausea, vomiting, hematemesis, melena, or hematochezia.   : No hematuria.   Integument: Negative.   Hematologic:  No easy bruising, no easy bleeding.  Neuro: Negative.   Musculoskeletal: No myalgia.    Exam:  BP (!) 177/105 (BP Location: Right arm, Patient Position: Chair, Cuff Size: Adult Regular)   Pulse 67   Wt 110.2 kg (243 lb)   SpO2 96%   BMI 31.63 kg/m    GENERAL APPEARANCE: alert and no distress  HEENT: no icterus, no central cyanosis  LYMPH/NECK: no adenopathy, no asymmetry, JVP not elevated, no carotid bruits.  RESPIRATORY: lungs clear to auscultation - no rales, rhonchi or wheezes, no use of accessory muscles, no retractions, respirations are unlabored, normal respiratory rate  CARDIOVASCULAR: irregular rhythm, normal S1, S2, no S3 or S4 and no murmur, click or rub, precordium quiet with normal PMI.  GI: soft, non tender  EXTREMITIES:no edema  NEURO: alert, normal speech,and affect  SKIN: Brownish skin discoloration below the right knee, varicose veins on the right side.     I have reviewed the labs and personally reviewed the EKG in clinic today.    Labs:  CBC RESULTS:   Lab Results   Component Value Date    WBC 4.9 08/11/2020    RBC 5.11 08/11/2020    HGB 15.9 08/11/2020    HCT 46.1 08/11/2020    MCV 90 08/11/2020    MCH 31.1 08/11/2020    MCHC 34.5 08/11/2020    RDW 13.7 08/11/2020     08/11/2020        BMP RESULTS:  Lab Results   Component Value Date     08/11/2020    POTASSIUM 4.4 08/11/2020    CHLORIDE 110 (H) 08/11/2020    CO2 21 08/11/2020    ANIONGAP 8 08/11/2020     (H) 08/11/2020    BUN 12 08/11/2020    CR 1.20 08/11/2020    GFRESTIMATED 58 (L) 08/11/2020    GFRESTBLACK 67 08/11/2020    MELIDA 8.5 08/11/2020        INR RESULTS:  Lab Results   Component Value Date    INR 2.20 (H) 02/05/2021    INR 2.00 (H) 01/08/2021    INR 2.50 (H) 12/11/2020    INR 2.60 (H) 11/13/2020       Echocardiogram 7/8/2020  The rhythm was atrial fibrillation.  Left ventricular systolic function is normal.  The visual ejection fraction is estimated at 60-65%.  The left ventricle is normal in size.  There is mild concentric left ventricular hypertrophy.  The right ventricle is normal in structure, function and size.  There is moderate biatrial enlargement.  Doppler interrogation does not demonstrate significant stenosis or  insufficiency involving cardiac valves.   No old studies for compariosn.    EKG 7/7/2020 atrial fibrillation with RVR      EKG reviewed in clinic 9/2/2020: Incomplete right bundle branch block, left axis deviation, atrial fibrillation heart rate 82 bpm    Assessment and Plan:   Mr. Jimi Moreno is a 76 year old  male with PMH significant for hypertension and permanent atrial fibrillation on rate control and warfarin is being seen today for follow-up.      He reports overall doing well.  No concerning cardiac symptoms.      #Hypertension   -Reports well-controlled blood pressure at home   -Blood pressure high in clinic   -Continue diltiazem 360 mg for hypertension and rate control of A. fib   -Monitor blood pressure at home and report to us in a week     #Permanent atrial fibrillation   -EKG reviewed in clinic today shows atrial fibrillation heart rate 110 bpm (patient denies palpitations)  -Start metoprolol 50 mg daily for rate control  -Continue warfarin    #Excessive alcohol use  -Drinks 4  alcoholic beverages a day.  -Counseled against alcohol    Medication change today:  Start metoprolol 50 mg/day to improve rate control of A. fib  Continue all other medications.    Return to clinic 1 year.    Total time spent 40 minutes including face-to-face clinic visit, review of labs/imaging and medical documentation.     Please donot hesitate to contact me if you have any questions or concerns. Again, thank you for allowing me to participate in the care of your patient.    Siva GTZ MD  [facility] Division of Cardiology  Pager [phone]

## 2021-03-03 NOTE — PATIENT INSTRUCTIONS
Thank you for coming to the [facility] @ [facility]; please note the following instructions:    1. START: Metoprolol succinate ER 50mg daily    1. Monitor blood pressure for one week twice a day and send us the results.     2. Decrease alcohol consumption    3. Dr. Paniagua Can would like you to return for a cardiac follow up in 1 year  (March).  We will contact you regarding your appointment when the time draws closer or you may call [phone] option #1 to arrange an appointment.  Mean while, if you should have any questions or concerns regarding your heart health, please contact us.  Thank you for choosing [facility] for your care.        If you have any questions regarding your visit please contact your care team:     Cardiology  Telephone Number   Louluo ACKERMAN, RN  Meghna NIETO, RN   Arelis DAWSON, PARKER SNYDER, PARKER ACOSTA, LPN   [phone] (option 1)   For scheduling appts:     [phone] (select option 1)       For the Device Clinic (Pacemakers and ICD's)  RN's :  Elizabeth Davison   During business hours: [phone]    *After business hours:  [phone] (select option 4)      Normal test result notifications will be released via [employer] or mailed within 7 business days.  All other test results, will be communicated via telephone once reviewed by your cardiologist.    If you need a medication refill please contact your pharmacy.  Please allow 3 business days for your refill to be completed.    As always, thank you for trusting us with your health care needs!    Patient Education     Metoprolol extended-release tablets  Brand Names: toprol, Toprol XL  What is this medicine?  METOPROLOL (me TOE proe lole) is a beta-blocker. Beta-blockers reduce the workload on the heart and help it to beat more regularly. This medicine is used to treat high blood pressure and to prevent chest pain. It is also used to after a heart attack and to prevent an additional heart attack from  occurring.  How should I use this medicine?  Take this medicine by mouth with a glass of water. Follow the directions on the prescription label. Do not crush or chew. Take this medicine with or immediately after meals. Take your doses at regular intervals. Do not take more medicine than directed. Do not stop taking this medicine suddenly. This could lead to serious heart-related effects.  Talk to your pediatrician regarding the use of this medicine in children. While this drug may be prescribed for children as young as 6 years for selected conditions, precautions do apply.  What side effects may I notice from receiving this medicine?  Side effects that you should report to your doctor or health care professional as soon as possible:    allergic reactions like skin rash, itching or hives    cold or numb hands or feet    depression    difficulty breathing    faint    fever with sore throat    irregular heartbeat, chest pain    rapid weight gain    swollen legs or ankles  Side effects that usually do not require medical attention (report to your doctor or health care professional if they continue or are bothersome):    anxiety or nervousness    change in sex drive or performance    dry skin    headache    nightmares or trouble sleeping    short term memory loss    stomach upset or diarrhea    unusually tired  What may interact with this medicine?  This medicine may interact with the following medications:    certain medicines for blood pressure, heart disease, irregular heart beat    certain medicines for depression, like monoamine oxidase (MAO) inhibitors, fluoxetine, or paroxetine    clonidine    dobutamine    epinephrine    isoproterenol    reserpine  What if I miss a dose?  If you miss a dose, take it as soon as you can. If it is almost time for your next dose, take only that dose. Do not take double or extra doses.  Where should I keep my medicine?  Keep out of the reach of children.  Store at room temperature  between 15 and 30 degrees C (59 and 86 degrees F). Throw away any unused medicine after the expiration date.  What should I tell my health care provider before I take this medicine?  They need to know if you have any of these conditions:    diabetes    heart or vessel disease like slow heart rate, worsening heart failure, heart block, sick sinus syndrome or Raynaud's disease    kidney disease    liver disease    lung or breathing disease, like asthma or emphysema    pheochromocytoma    thyroid disease    an unusual or allergic reaction to metoprolol, other beta-blockers, medicines, foods, dyes, or preservatives    pregnant or trying to get pregnant    breast-feeding  What should I watch for while using this medicine?  Visit your doctor or health care professional for regular check ups. Contact your doctor right away if your symptoms worsen. Check your blood pressure and pulse rate regularly. Ask your health care professional what your blood pressure and pulse rate should be, and when you should contact them.  You may get drowsy or dizzy. Do not drive, use machinery, or do anything that needs mental alertness until you know how this medicine affects you. Do not sit or stand up quickly, especially if you are an older patient. This reduces the risk of dizzy or fainting spells. Contact your doctor if these symptoms continue. Alcohol may interfere with the effect of this medicine. Avoid alcoholic drinks.  NOTE:This sheet is a summary. It may not cover all possible information. If you have questions about this medicine, talk to your doctor, pharmacist, or health care provider. Copyright  2020 [employer]

## 2021-03-05 ENCOUNTER — ANTICOAGULATION THERAPY VISIT (OUTPATIENT)
Dept: LAB | Facility: CLINIC | Age: 76
End: 2021-03-05

## 2021-03-05 DIAGNOSIS — I48.91 ATRIAL FIBRILLATION WITH RVR (H): ICD-10-CM

## 2021-03-05 LAB
CAPILLARY BLOOD COLLECTION: NORMAL
INR PPP: 2.7 (ref 0.86–1.14)

## 2021-03-05 PROCEDURE — 85610 PROTHROMBIN TIME: CPT | Performed by: NURSE PRACTITIONER

## 2021-03-05 PROCEDURE — 36416 COLLJ CAPILLARY BLOOD SPEC: CPT | Performed by: NURSE PRACTITIONER

## 2021-03-05 NOTE — PROGRESS NOTES
ANTICOAGULATION MANAGEMENT     Patient Name:  Jimi Moreno  Date:  3/5/2021    ASSESSMENT /SUBJECTIVE:    Today's INR result of 2.7 is therapeutic. Goal INR of 2.0-3.0      Warfarin dose taken: Warfarin taken as instructed    Diet: No new diet changes affecting INR    Medication changes/ interactions: No interaction expected between Metoprolol and warfarin    Previous INR: Therapeutic     S/S of bleeding or thromboembolism: No    New injury or illness: No    Upcoming surgery, procedure or cardioversion: No    Additional findings: None      PLAN:    Telephone call with Jimi regarding INR result and instructed:     Warfarin Dosing Instructions: Continue your current warfarin dose 5 mg Sun/Tue/Thu and 7.5 mg ROW    Instructed patient to follow up no later than: 4 weeks  Lab visit scheduled    Education provided: Target INR goal and significance of current INR result      Jimi verbalizes understanding and agrees to warfarin dosing plan.    Instructed to call the Anticoagulation Clinic for any changes, questions or concerns. (#[phone])        Aby Jung RN      OBJECTIVE:  Recent labs: (last 7 days)     21  0811   INR 2.70*         No question data found.  Anticoagulation Summary  As of 3/5/2021    INR goal:  2.0-3.0   TTR:  90.1 % (7.5 mo)   INR used for dosin.70 (3/5/2021)   Warfarin maintenance plan:  5 mg (5 mg x 1) every Sun, Tue, Thu; 7.5 mg (5 mg x 1.5) all other days   Full warfarin instructions:  5 mg every Sun, Tue, Thu; 7.5 mg all other days   Weekly warfarin total:  45 mg   No change documented:  Aby Jung RN   Plan last modified:  Jyotsna Alcantara RN (2020)   Next INR check:  2021   Priority:  Maintenance   Target end date:  Indefinite    Indications    Atrial fibrillation with RVR (H) [I48.91]             Anticoagulation Episode Summary     INR check location:      Preferred lab:      Send INR reminders to:  PAMELA GONSALEZ    Comments:        Anticoagulation  Care Providers     Provider Role Specialty Phone number    Francia Finnegan, NP Referring Nurse Practitioner - Family [phone]

## 2021-03-11 ENCOUNTER — MEDICAL CORRESPONDENCE (OUTPATIENT)
Dept: HEALTH INFORMATION MANAGEMENT | Facility: CLINIC | Age: 76
End: 2021-03-11

## 2021-03-15 ENCOUNTER — TELEPHONE (OUTPATIENT)
Dept: CARDIOLOGY | Facility: CLINIC | Age: 76
End: 2021-03-15

## 2021-03-15 NOTE — CONFIDENTIAL NOTE
Reason for Call:  Other call back    Detailed comments: Patent dropped off a Pull Lizandro parrish    Phone Number Patient can be reached at: Cell number on file:    Telephone Information:   Mobile [phone]       Best Time: any    Can we leave a detailed message on this number? YES    Call taken on 3/15/2021 at 2:12 PM by Zaida Christy

## 2021-03-16 NOTE — TELEPHONE ENCOUNTER
----- Message from Siva Devlin MD sent at 3/16/2021  3:36 PM CDT -----  I reviewed patient's blood pressure readings from home.  They are all well controlled.  I do not recommend any changes at this time.  Thank you.

## 2021-03-16 NOTE — TELEPHONE ENCOUNTER
Please communicate MD's response to patient.    Loulou Donis, RN  Cardiology Care Coordinator  [facility]  [phone] option 1

## 2021-03-17 ENCOUNTER — TELEPHONE (OUTPATIENT)
Dept: CARDIOLOGY | Facility: CLINIC | Age: 76
End: 2021-03-17

## 2021-03-17 NOTE — TELEPHONE ENCOUNTER
Patient notified , and requested normal range blood pressure form be mailed to residence .Claribel Dugan L.P.N.,[place].

## 2021-03-22 NOTE — TELEPHONE ENCOUNTER
Spoke to patient and relayed MD's response.  Patient verbalized understanding.    Loulou Donis, RN  Cardiology Care Coordinator  [facility]  [phone] option 1

## 2021-04-02 ENCOUNTER — ANTICOAGULATION THERAPY VISIT (OUTPATIENT)
Dept: NURSING | Facility: CLINIC | Age: 76
End: 2021-04-02

## 2021-04-02 DIAGNOSIS — I48.91 ATRIAL FIBRILLATION WITH RVR (H): ICD-10-CM

## 2021-04-02 LAB
CAPILLARY BLOOD COLLECTION: NORMAL
INR PPP: 2.7 (ref 0.86–1.14)

## 2021-04-02 PROCEDURE — 85610 PROTHROMBIN TIME: CPT | Performed by: NURSE PRACTITIONER

## 2021-04-02 PROCEDURE — 36416 COLLJ CAPILLARY BLOOD SPEC: CPT | Performed by: NURSE PRACTITIONER

## 2021-04-02 RX ORDER — WARFARIN SODIUM 5 MG/1
TABLET ORAL
Qty: 120 TABLET | Refills: 1 | COMMUNITY
Start: 2021-04-02 | End: 2021-06-08

## 2021-04-02 NOTE — PROGRESS NOTES
ANTICOAGULATION MANAGEMENT     Patient Name:  Jimi Moreno  Date:  2021    ASSESSMENT /SUBJECTIVE:    Today's INR result of 2.70 is therapeutic. Goal INR of 2.0-3.0      Warfarin dose taken: Warfarin taken as instructed    Diet: No new diet changes affecting INR    Medication changes/ interactions: No new medications/supplements affecting INR    Previous INR: Therapeutic 2.70    S/S of bleeding or thromboembolism: No    New injury or illness: No    Upcoming surgery, procedure or cardioversion: No    Additional findings: None      PLAN:    Telephone call with Jimi regarding INR result and instructed:     Warfarin Dosing Instructions: Continue your current warfarin dose 5mg Sun,Tues,Thurs; 7.5mg all other days    Instructed patient to follow up no later than: 4 weeks  Lab visit scheduled    Education provided: Importance of notifying clinic for changes in medications or health; a sooner lab recheck maybe needed       Jimi verbalizes understanding and agrees to warfarin dosing plan.    Instructed to call the Anticoagulation Clinic for any changes, questions or concerns. (#[phone])        Lashaun Hwang RN CACP       OBJECTIVE:  Recent labs: (last 7 days)     21  0809   INR 2.70*         INR assessment THER    Recheck INR In: 4 WEEKS    INR Location Clinic      Anticoagulation Summary  As of 2021    INR goal:  2.0-3.0   TTR:  91.2 % (8.4 mo)   INR used for dosin.70 (2021)   Warfarin maintenance plan:  5 mg (5 mg x 1) every Sun, Tue, Thu; 7.5 mg (5 mg x 1.5) all other days   Full warfarin instructions:  5 mg every Sun, Tue, Thu; 7.5 mg all other days   Weekly warfarin total:  45 mg   No change documented:  Lashaun Hwang RN   Plan last modified:  Jyotsna Alcantara RN (2020)   Next INR check:  2021   Priority:  Maintenance   Target end date:  Indefinite    Indications    Atrial fibrillation with RVR (H) [I48.91]             Anticoagulation Episode Summary     INR  check location:      Preferred lab:      Send INR reminders to:  PAMELA GONSALEZ    Comments:        Anticoagulation Care Providers     Provider Role Specialty Phone number    Francia Finnegan, NP Referring Nurse Practitioner - Family [phone]

## 2021-04-30 ENCOUNTER — ANTICOAGULATION THERAPY VISIT (OUTPATIENT)
Dept: NURSING | Facility: CLINIC | Age: 76
End: 2021-04-30

## 2021-04-30 DIAGNOSIS — I48.91 ATRIAL FIBRILLATION WITH RVR (H): ICD-10-CM

## 2021-04-30 LAB
CAPILLARY BLOOD COLLECTION: NORMAL
INR PPP: 3.3 (ref 0.86–1.14)

## 2021-04-30 PROCEDURE — 36416 COLLJ CAPILLARY BLOOD SPEC: CPT | Performed by: NURSE PRACTITIONER

## 2021-04-30 PROCEDURE — 85610 PROTHROMBIN TIME: CPT | Performed by: NURSE PRACTITIONER

## 2021-04-30 NOTE — PROGRESS NOTES
ANTICOAGULATION FOLLOW-UP CLINIC VISIT    Patient Name:  Jimi Moreno  Date:  4/30/2021  Contact Type:  Telephone  No change in anything reportable maybe less greens, dose kept the same and will focus on increased greens and recheck in 2 weeks  SUBJECTIVE:  Patient Findings         Clinical Outcomes     Negatives:  Major bleeding event, Thromboembolic event, Anticoagulation-related hospital admission, Anticoagulation-related ED visit, Anticoagulation-related fatality           OBJECTIVE    Recent labs: (last 7 days)     04/30/21  0816   INR 3.30*       ASSESSMENT / PLAN  INR assessment SUPRA    Recheck INR In: 2 WEEKS    INR Location Clinic      Anticoagulation Summary  As of 4/30/2021    INR goal:  2.0-3.0   TTR:  87.1 % (9.3 mo)   INR used for dosing:  3.30 (4/30/2021)   Warfarin maintenance plan:  5 mg (5 mg x 1) every Sun, Tue, Thu; 7.5 mg (5 mg x 1.5) all other days   Full warfarin instructions:  5 mg every Sun, Tue, Thu; 7.5 mg all other days   Weekly warfarin total:  45 mg   Plan last modified:  Jyotsna Alcantara RN (8/21/2020)   Next INR check:  5/14/2021   Priority:  Maintenance   Target end date:  Indefinite    Indications    Atrial fibrillation with RVR (H) [I48.91]             Anticoagulation Episode Summary     INR check location:      Preferred lab:      Send INR reminders to:  PAMELA GONSALEZ    Comments:        Anticoagulation Care Providers     Provider Role Specialty Phone number    Kain Francia LOUIE, NP Referring Nurse Practitioner - Family [phone]            See the Encounter Report to view Anticoagulation Flowsheet and Dosing Calendar (Go to Encounters tab in chart review, and find the Anticoagulation Therapy Visit)        Jyotsna Alcantara RN

## 2021-05-06 DIAGNOSIS — I48.21 PERMANENT ATRIAL FIBRILLATION (H): ICD-10-CM

## 2021-05-14 ENCOUNTER — ANTICOAGULATION THERAPY VISIT (OUTPATIENT)
Dept: FAMILY MEDICINE | Facility: CLINIC | Age: 76
End: 2021-05-14

## 2021-05-14 DIAGNOSIS — I48.91 ATRIAL FIBRILLATION WITH RVR (H): ICD-10-CM

## 2021-05-14 LAB
CAPILLARY BLOOD COLLECTION: NORMAL
INR PPP: 3.3 (ref 0.86–1.14)

## 2021-05-14 PROCEDURE — 85610 PROTHROMBIN TIME: CPT | Performed by: NURSE PRACTITIONER

## 2021-05-14 PROCEDURE — 36416 COLLJ CAPILLARY BLOOD SPEC: CPT | Performed by: NURSE PRACTITIONER

## 2021-05-14 NOTE — PROGRESS NOTES
ANTICOAGULATION MANAGEMENT     Patient Name:  Jimi Moreno  Date:  5/14/2021    ASSESSMENT /SUBJECTIVE:    Today's INR result of 3.30 is supratherapeutic. Goal INR of 2.0-3.0      Warfarin dose taken: Warfarin taken as instructed    Diet: No new diet changes affecting INR    Medication changes/ interactions: No new medications/supplements affecting INR    Previous INR: Supratherapeutic     S/S of bleeding or thromboembolism: No    New injury or illness: No    Upcoming surgery, procedure or cardioversion: No    Additional findings: None      PLAN:    Telephone call with Jimi regarding INR result and instructed:     Warfarin Dosing Instructions: Change your warfarin dose to 7.5mg every Mon, Wed, & Sat; 5mg all other days of the week.   . (5.6 % change)    Instructed patient to follow up no later than: 2 weeks  Lab visit scheduled    Education provided: Target INR goal and significance of current INR result, Importance of therapeutic range, Monitoring for bleeding signs and symptoms and When to seek medical attention/emergency care      Jimi verbalizes understanding and agrees to warfarin dosing plan.    Instructed to call the Anticoagulation Clinic for any changes, questions or concerns. (#[phone])        Zacarias Feliciano RN      OBJECTIVE:  Recent labs: (last 7 days)     05/14/21  0950   INR 3.30*         No question data found.  Anticoagulation Summary  As of 5/14/2021    INR goal:  2.0-3.0   TTR:  82.9 % (9.8 mo)   INR used for dosing:     Warfarin maintenance plan:  7.5 mg (5 mg x 1.5) every Mon, Wed, Sat; 5 mg (5 mg x 1) all other days   Full warfarin instructions:  7.5 mg every Mon, Wed, Sat; 5 mg all other days   Weekly warfarin total:  42.5 mg   Plan last modified:  Zacarias Feliciano, RN (5/14/2021)   Next INR check:     Target end date:  Indefinite    Indications    Atrial fibrillation with RVR (H) [I48.91]             Anticoagulation Episode Summary     INR check location:      Preferred lab:      Send INR  reminders to:  PAMELA GONSALEZ    Comments:        Anticoagulation Care Providers     Provider Role Specialty Phone number    Francia Finnegan, NP Referring Nurse Practitioner - Family [phone]

## 2021-05-28 ENCOUNTER — ANTICOAGULATION THERAPY VISIT (OUTPATIENT)
Dept: FAMILY MEDICINE | Facility: CLINIC | Age: 76
End: 2021-05-28

## 2021-05-28 DIAGNOSIS — I48.91 ATRIAL FIBRILLATION WITH RVR (H): ICD-10-CM

## 2021-05-28 LAB
CAPILLARY BLOOD COLLECTION: NORMAL
INR PPP: 2.2 (ref 0.86–1.14)

## 2021-05-28 PROCEDURE — 36416 COLLJ CAPILLARY BLOOD SPEC: CPT | Performed by: NURSE PRACTITIONER

## 2021-05-28 PROCEDURE — 85610 PROTHROMBIN TIME: CPT | Performed by: NURSE PRACTITIONER

## 2021-05-28 NOTE — PROGRESS NOTES
Anticoagulation Management    Unable to reach Jimi today.    Today's INR result of 2.2 is therapeutic (goal INR of 2.0-3.0).  Result received from: Clinic Lab    Follow up required to confirm warfarin dose taken and assess for changes    Left message to call [phone]     Tentative plan: continue maintenance dose and recheck INR in 3 weeks    Anticoagulation clinic to follow up    Aby Jung RN

## 2021-05-28 NOTE — PROGRESS NOTES
ANTICOAGULATION MANAGEMENT     Patient Name:  Jimi Moreno  Date:  2021    ASSESSMENT /SUBJECTIVE:    Today's INR result of 2.2 is therapeutic. Goal INR of 2.0-3.0      Warfarin dose taken: Warfarin taken as instructed    Diet: No new diet changes affecting INR    Medication changes/ interactions: No new medications/supplements affecting INR    Previous INR: Therapeutic     S/S of bleeding or thromboembolism: No    New injury or illness: No    Upcoming surgery, procedure or cardioversion: No    Additional findings: None      PLAN:    Telephone call with Jimi whom verbalizes understanding and agrees to plan regarding INR result and instructed:     Warfarin Dosing Instructions: Continue your current warfarin dose 7.5 mg Mon/Wed/Sat and 5 mg ROW    Instructed patient to follow up no later than: 3 weeks  Lab visit scheduled    Education provided: Target INR goal and significance of current INR result and Contact [facility] Anticoagulation: [phone]  with any changes, questions or concerns.       Jimi verbalizes understanding and agrees to warfarin dosing plan.    Instructed to call the Anticoagulation Clinic for any changes, questions or concerns. (#[phone])        Aby Jung RN      OBJECTIVE:  Recent labs: (last 7 days)     21  0857   INR 2.20*         INR assessment THER    Recheck INR In: 3 WEEKS    INR Location Clinic      Anticoagulation Summary  As of 2021    INR goal:  2.0-3.0   TTR:  82.5 % (10.3 mo)   INR used for dosin.20 (2021)   Warfarin maintenance plan:  7.5 mg (5 mg x 1.5) every Mon, Wed, Sat; 5 mg (5 mg x 1) all other days   Full warfarin instructions:  7.5 mg every Mon, Wed, Sat; 5 mg all other days   Weekly warfarin total:  42.5 mg   No change documented:  Aby Jung RN   Plan last modified:  Zacarias Feliciano RN (2021)   Next INR check:  2021   Priority:  Maintenance   Target end date:  Indefinite    Indications    Atrial fibrillation  with RVR (H) [I48.91]             Anticoagulation Episode Summary     INR check location:      Preferred lab:      Send INR reminders to:  PAMELA GONSALEZ    Comments:        Anticoagulation Care Providers     Provider Role Specialty Phone number    Francia Finnegan, NP Referring Nurse Practitioner - Family [phone]

## 2021-05-28 NOTE — PROGRESS NOTES
Pt left a VM returning call.    Please return call to pt to follow up.  Thank you.  Krystle Marin RN on 5/28/2021 at 11:15 AM

## 2021-06-07 DIAGNOSIS — I48.91 ATRIAL FIBRILLATION WITH RVR (H): ICD-10-CM

## 2021-06-08 RX ORDER — WARFARIN SODIUM 5 MG/1
TABLET ORAL
Qty: 120 TABLET | Refills: 0 | Status: SHIPPED | OUTPATIENT
Start: 2021-06-08 | End: 2021-07-16

## 2021-06-08 NOTE — TELEPHONE ENCOUNTER
Routing refill request to provider for review/approval because:  Labs out of range:  INR    INR   Date Value Ref Range Status   05/28/2021 2.20 (H) 0.86 - 1.14 Final     Comment:     This test is intended for monitoring Coumadin therapy.  Results are not   accurate in patients with prolonged INR due to factor deficiency.                Pending Prescriptions:                       Disp   Refills    warfarin ANTICOAGULANT (COUMADIN) 5 MG tab*120 ta*1        Sig: Take 5 mg (5 mg x 1) every Sun, Tue, Thu; 7.5 mg (5           mg x 1.5) all other days or as directed by the           Anticoagulation Clinic        Kermit Cooley RN

## 2021-06-18 ENCOUNTER — ANTICOAGULATION THERAPY VISIT (OUTPATIENT)
Dept: FAMILY MEDICINE | Facility: CLINIC | Age: 76
End: 2021-06-18

## 2021-06-18 DIAGNOSIS — I48.91 ATRIAL FIBRILLATION WITH RVR (H): ICD-10-CM

## 2021-06-18 LAB
CAPILLARY BLOOD COLLECTION: NORMAL
INR PPP: 2.5 (ref 0.86–1.14)

## 2021-06-18 PROCEDURE — 85610 PROTHROMBIN TIME: CPT | Performed by: NURSE PRACTITIONER

## 2021-06-18 PROCEDURE — 36416 COLLJ CAPILLARY BLOOD SPEC: CPT | Performed by: NURSE PRACTITIONER

## 2021-06-18 NOTE — PROGRESS NOTES
ANTICOAGULATION MANAGEMENT     Patient Name:  Jimi Moreno  Date:  2021    ASSESSMENT /SUBJECTIVE:    Today's INR result of 2.5 is therapeutic. Goal INR of 2.0-3.0      Warfarin dose taken: Missed dose may be affecting INR    Diet: No new diet changes identified    Medication changes/ interactions: No new medications/supplements affecting INR    Previous INR: Therapeutic     S/S of bleeding or thromboembolism: No    New injury or illness: No    Upcoming surgery, procedure or cardioversion: No    Additional findings: None      PLAN:    Warfarin Dosing Instructions: Continue your current warfarin dose 7.5 mg Mon/Wed/Sat and 5 mg ROW    Instructed patient to follow up no later than: 4 weeks  Lab visit scheduled    Education provided: Target INR goal and significance of current INR result    Telephone call with Jimi whom verbalizes understanding and agrees to plan    Instructed to call the Anticoagulation Clinic for any changes, questions or concerns. (#[phone])        Aby Jung RN      OBJECTIVE:  Recent labs: (last 7 days)     21  0814   INR 2.50*         No question data found.  Anticoagulation Summary  As of 2021    INR goal:  2.0-3.0   TTR:  83.6 % (11 mo)   INR used for dosin.50 (2021)   Warfarin maintenance plan:  7.5 mg (5 mg x 1.5) every Mon, Wed, Sat; 5 mg (5 mg x 1) all other days   Full warfarin instructions:  7.5 mg every Mon, Wed, Sat; 5 mg all other days   Weekly warfarin total:  42.5 mg   No change documented:  Aby Jung RN   Plan last modified:  Zacarias Feliciano RN (2021)   Next INR check:  2021   Priority:  Maintenance   Target end date:  Indefinite    Indications    Atrial fibrillation with RVR (H) [I48.91]             Anticoagulation Episode Summary     INR check location:      Preferred lab:      Send INR reminders to:  PAMELA GONSALEZ    Comments:        Anticoagulation Care Providers     Provider Role Specialty Phone number    Francia Finnegan  LOUIE NP Referring Nurse Practitioner - Family [phone]

## 2021-07-14 DIAGNOSIS — I48.91 ATRIAL FIBRILLATION WITH RVR (H): Primary | ICD-10-CM

## 2021-07-16 ENCOUNTER — LAB (OUTPATIENT)
Dept: LAB | Facility: CLINIC | Age: 76
End: 2021-07-16
Payer: COMMERCIAL

## 2021-07-16 ENCOUNTER — ANTICOAGULATION THERAPY VISIT (OUTPATIENT)
Dept: FAMILY MEDICINE | Facility: CLINIC | Age: 76
End: 2021-07-16

## 2021-07-16 DIAGNOSIS — I48.91 ATRIAL FIBRILLATION WITH RVR (H): ICD-10-CM

## 2021-07-16 DIAGNOSIS — I48.91 ATRIAL FIBRILLATION WITH RVR (H): Primary | ICD-10-CM

## 2021-07-16 LAB — INR BLD: 3 (ref 0.9–1.1)

## 2021-07-16 PROCEDURE — 36416 COLLJ CAPILLARY BLOOD SPEC: CPT

## 2021-07-16 PROCEDURE — 85610 PROTHROMBIN TIME: CPT

## 2021-07-16 RX ORDER — WARFARIN SODIUM 5 MG/1
TABLET ORAL
Qty: 120 TABLET | Refills: 1 | Status: SHIPPED | OUTPATIENT
Start: 2021-07-16 | End: 2021-12-17

## 2021-07-16 NOTE — PROGRESS NOTES
ANTICOAGULATION MANAGEMENT     Jimi Moreno 76 year old male is on warfarin with therapeutic INR result. (Goal INR 2.0-3.0)    Recent labs: (last 7 days)     07/16/21  0812   INR 3.0*       ASSESSMENT     Source(s): Chart Review and Patient/Caregiver Call       Warfarin doses taken: Warfarin taken as instructed    Diet: No new diet changes identified    New illness, injury, or hospitalization: No    Medication/supplement changes: None noted    Signs or symptoms of bleeding or clotting: No    Previous INR: Therapeutic last 2(+) visits    Additional findings: None     PLAN     Recommended plan for no diet, medication or health factor changes affecting INR     Dosing Instructions: Continue your current warfarin dose with next INR in 4 weeks       Summary  As of 7/16/2021    Full warfarin instructions:  7.5 mg every Mon, Wed, Sat; 5 mg all other days   Next INR check:  8/13/2021             Telephone call with Jimi who verbalizes understanding and agrees to plan    Lab visit scheduled    Education provided: Target INR goal and significance of current INR result    Plan made per [facility] anticoagulation protocol    Aby Jung RN  Anticoagulation Clinic  7/16/2021    _______________________________________________________________________     Anticoagulation Episode Summary     Current INR goal:  2.0-3.0   TTR:  84.9 % (11.9 mo)   Target end date:  Indefinite   Send INR reminders to:  [facility]    Indications    Atrial fibrillation with RVR (H) [I48.91]           Comments:           Anticoagulation Care Providers     Provider Role Specialty Phone number    Francia Finnegan NP Referring Nurse Practitioner - Family [phone]

## 2021-08-13 ENCOUNTER — LAB (OUTPATIENT)
Dept: LAB | Facility: CLINIC | Age: 76
End: 2021-08-13
Payer: COMMERCIAL

## 2021-08-13 ENCOUNTER — TELEPHONE (OUTPATIENT)
Dept: FAMILY MEDICINE | Facility: CLINIC | Age: 76
End: 2021-08-13

## 2021-08-13 ENCOUNTER — ANTICOAGULATION THERAPY VISIT (OUTPATIENT)
Dept: ANTICOAGULATION | Facility: CLINIC | Age: 76
End: 2021-08-13

## 2021-08-13 DIAGNOSIS — I48.91 ATRIAL FIBRILLATION WITH RVR (H): Primary | ICD-10-CM

## 2021-08-13 DIAGNOSIS — I48.91 ATRIAL FIBRILLATION WITH RVR (H): ICD-10-CM

## 2021-08-13 LAB — INR BLD: 3.2 (ref 0.9–1.1)

## 2021-08-13 PROCEDURE — 85610 PROTHROMBIN TIME: CPT

## 2021-08-13 PROCEDURE — 36416 COLLJ CAPILLARY BLOOD SPEC: CPT

## 2021-08-13 NOTE — PROGRESS NOTES
ANTICOAGULATION MANAGEMENT     Jimi Moreno 76 year old male is on warfarin with supratherapeutic INR result. (Goal INR 2.0-3.0)    Recent labs: (last 7 days)     08/13/21  0817   INR 3.2*       ASSESSMENT     Source(s): Chart Review and Patient/Caregiver Call       Warfarin doses taken: Warfarin taken as instructed    Diet: No new diet changes identified    New illness, injury, or hospitalization: No    Medication/supplement changes: None noted    Signs or symptoms of bleeding or clotting: No    Previous INR: Therapeutic last 2(+) visits    Additional findings: None     PLAN     Recommended plan for no diet, medication or health factor changes affecting INR     Dosing Instructions: Continue your current warfarin dose with next INR in 2 weeks       Summary  As of 8/13/2021    Full warfarin instructions:  7.5 mg every Mon, Wed, Sat; 5 mg all other days   Next INR check:  8/27/2021             Telephone call with Jimi who verbalizes understanding and agrees to plan    Lab visit scheduled    Education provided: Target INR goal and significance of current INR result    Plan made per [facility] anticoagulation protocol    Aby Jung RN  Anticoagulation Clinic  8/13/2021    _______________________________________________________________________     Anticoagulation Episode Summary     Current INR goal:  2.0-3.0   TTR:  80.3 % (1 y)   Target end date:  Indefinite   Send INR reminders to:  [facility]    Indications    Atrial fibrillation with RVR (H) [I48.91]           Comments:           Anticoagulation Care Providers     Provider Role Specialty Phone number    Francia Finnegan NP Referring Nurse Practitioner - Family [phone]

## 2021-08-13 NOTE — TELEPHONE ENCOUNTER
ANTICOAGULATION MANAGEMENT      Jimi Moreno due for annual renewal of referral to anticoagulation monitoring. Order pended for your review and signature.      ANTICOAGULATION SUMMARY      Warfarin indication(s)     Atrial fibrillation    Heart valve present?  NO       Current goal range   INR: 2.0-3.0     Goal appropriate for indication? Yes, INR 2-3 appropriate for hx of DVT, PE, hypercoagulable state, Afib, LVAD, or bileaflet AVR without risk factors     Current duration of therapy Indefinite/long term therapy   Time in Therapeutic Range (TTR)  (Goal > 60%) 80.3%       Office visit with referring provider's group within last year no on 7/20/20       Aby Jung RN

## 2021-08-27 ENCOUNTER — LAB (OUTPATIENT)
Dept: LAB | Facility: CLINIC | Age: 76
End: 2021-08-27
Payer: COMMERCIAL

## 2021-08-27 ENCOUNTER — ANTICOAGULATION THERAPY VISIT (OUTPATIENT)
Dept: ANTICOAGULATION | Facility: CLINIC | Age: 76
End: 2021-08-27

## 2021-08-27 DIAGNOSIS — I48.91 ATRIAL FIBRILLATION WITH RVR (H): ICD-10-CM

## 2021-08-27 DIAGNOSIS — I48.91 ATRIAL FIBRILLATION WITH RVR (H): Primary | ICD-10-CM

## 2021-08-27 LAB — INR BLD: 4.2 (ref 0.9–1.1)

## 2021-08-27 PROCEDURE — 85610 PROTHROMBIN TIME: CPT

## 2021-08-27 PROCEDURE — 36416 COLLJ CAPILLARY BLOOD SPEC: CPT

## 2021-08-27 NOTE — PROGRESS NOTES
ANTICOAGULATION MANAGEMENT     Jimi Moreno 76 year old male is on warfarin with supratherapeutic INR result. (Goal INR 2.0-3.0)    Recent labs: (last 7 days)     08/27/21  0938   INR 4.2*       ASSESSMENT     Source(s): Chart Review and Patient/Caregiver Call       Warfarin doses taken: Warfarin taken as instructed    Diet: No new diet changes identified    New illness, injury, or hospitalization: Yes: cut thumb on mandolin.    Medication/supplement changes: None noted    Signs or symptoms of bleeding or clotting: No    Previous INR: Supratherapeutic    Additional findings: None     PLAN     Recommended plan for no diet, medication or health factor changes affecting INR     Dosing Instructions: Hold dose then Decrease your warfarin dose (11.8% change) with next INR in 2 weeks       Summary  As of 8/27/2021    Full warfarin instructions:  8/27: Hold; Otherwise 7.5 mg every Mon; 5 mg all other days   Next INR check:  9/10/2021             Telephone call with Jimi who verbalizes understanding and agrees to plan    Lab visit scheduled    Education provided: Importance of consistent vitamin K intake, Goal range and significance of current result and Monitoring for bleeding signs and symptoms    Plan made per [facility] anticoagulation protocol    Aby Jung RN  Anticoagulation Clinic  8/27/2021    _______________________________________________________________________     Anticoagulation Episode Summary     Current INR goal:  2.0-3.0   TTR:  79.8 % (1 y)   Target end date:  Indefinite   Send INR reminders to:  [facility]    Indications    Atrial fibrillation with RVR (H) [I48.91]           Comments:           Anticoagulation Care Providers     Provider Role Specialty Phone number    Francia Finnegan NP Referring Nurse Practitioner - Family [phone]    Nikki Alarcon APRN CNP Referring Nurse Practitioner - Adult Health [phone]

## 2021-09-07 DIAGNOSIS — N40.1 BENIGN PROSTATIC HYPERPLASIA WITH LOWER URINARY TRACT SYMPTOMS, SYMPTOM DETAILS UNSPECIFIED: ICD-10-CM

## 2021-09-07 DIAGNOSIS — N40.0 ENLARGED PROSTATE: ICD-10-CM

## 2021-09-07 NOTE — LETTER
[facility]  [address] [address]  [phone]          September 14, 2021    Jimi Moreno                                                                                                                     [address] [address]            Dear Jimi,    Your provider has sent a  fantasma refill of your medications. You are due for an appointment for further refills.  Please contact the clinic to schedule an appointment for further refills.               Sincerely,       Team Faisal Pederesn MD

## 2021-09-08 RX ORDER — FINASTERIDE 5 MG/1
TABLET, FILM COATED ORAL
Qty: 90 TABLET | Refills: 0 | Status: SHIPPED | OUTPATIENT
Start: 2021-09-08 | End: 2021-10-27

## 2021-09-08 RX ORDER — TAMSULOSIN HYDROCHLORIDE 0.4 MG/1
CAPSULE ORAL
Qty: 180 CAPSULE | Refills: 0 | Status: SHIPPED | OUTPATIENT
Start: 2021-09-08 | End: 2021-10-27

## 2021-09-08 NOTE — TELEPHONE ENCOUNTER
Routing refill request to provider for review/approval because:  Patient needs to be seen because it has been more than 1 year since last office visit.  BP Readings from Last 3 Encounters:   03/03/21 (!) 149/92   09/02/20 (!) 153/88   08/11/20 125/81

## 2021-09-09 NOTE — TELEPHONE ENCOUNTER
Okay refill but needs to be seen soon in clinic at [place]    Please inform patient    Rodolfo Pedersen MD

## 2021-09-10 ENCOUNTER — LAB (OUTPATIENT)
Dept: LAB | Facility: CLINIC | Age: 76
End: 2021-09-10
Payer: COMMERCIAL

## 2021-09-10 ENCOUNTER — ANTICOAGULATION THERAPY VISIT (OUTPATIENT)
Dept: ANTICOAGULATION | Facility: CLINIC | Age: 76
End: 2021-09-10

## 2021-09-10 DIAGNOSIS — I48.91 ATRIAL FIBRILLATION WITH RVR (H): Primary | ICD-10-CM

## 2021-09-10 DIAGNOSIS — I48.91 ATRIAL FIBRILLATION WITH RVR (H): ICD-10-CM

## 2021-09-10 LAB — INR BLD: 1.9 (ref 0.9–1.1)

## 2021-09-10 PROCEDURE — 85610 PROTHROMBIN TIME: CPT

## 2021-09-10 PROCEDURE — 36416 COLLJ CAPILLARY BLOOD SPEC: CPT

## 2021-09-10 NOTE — PROGRESS NOTES
ANTICOAGULATION MANAGEMENT     Jimi Moreno 76 year old male is on warfarin with subtherapeutic INR result. (Goal INR 2.0-3.0)    Recent labs: (last 7 days)     09/10/21  0814   INR 1.9*       ASSESSMENT     Source(s): Chart Review and Patient/Caregiver Call       Warfarin doses taken: Warfarin taken as instructed    Diet: No new diet changes identified    New illness, injury, or hospitalization: No    Medication/supplement changes: None noted    Signs or symptoms of bleeding or clotting: No    Previous INR: Supratherapeutic    Additional findings: None     PLAN     Recommended plan for no diet, medication or health factor changes affecting INR     Dosing Instructions:  Increase your warfarin dose (6.7% change) with next INR in 2 weeks       Summary  As of 9/10/2021    Full warfarin instructions:  7.5 mg every Mon, Fri; 5 mg all other days   Next INR check:               Telephone call with Jimi who verbalizes understanding and agrees to plan    Lab visit scheduled    Education provided: Goal range and significance of current result    Plan made per [facility] anticoagulation protocol    Aby Jung RN  Anticoagulation Clinic  9/10/2021    _______________________________________________________________________     Anticoagulation Episode Summary     Current INR goal:  2.0-3.0   TTR:  77.6 % (1 y)   Target end date:  Indefinite   Send INR reminders to:  [facility]    Indications    Atrial fibrillation with RVR (H) [I48.91]           Comments:           Anticoagulation Care Providers     Provider Role Specialty Phone number    Francia Finnegan NP Referring Nurse Practitioner - Family [phone]    Nikki Alarcon APRN CNP Referring Nurse Practitioner - Adult Health [phone]

## 2021-09-24 ENCOUNTER — ANTICOAGULATION THERAPY VISIT (OUTPATIENT)
Dept: ANTICOAGULATION | Facility: CLINIC | Age: 76
End: 2021-09-24

## 2021-09-24 ENCOUNTER — LAB (OUTPATIENT)
Dept: LAB | Facility: CLINIC | Age: 76
End: 2021-09-24
Payer: COMMERCIAL

## 2021-09-24 DIAGNOSIS — I48.91 ATRIAL FIBRILLATION WITH RVR (H): Primary | ICD-10-CM

## 2021-09-24 DIAGNOSIS — I48.91 ATRIAL FIBRILLATION WITH RVR (H): ICD-10-CM

## 2021-09-24 LAB — INR BLD: 2.1 (ref 0.9–1.1)

## 2021-09-24 PROCEDURE — 36416 COLLJ CAPILLARY BLOOD SPEC: CPT

## 2021-09-24 PROCEDURE — 85610 PROTHROMBIN TIME: CPT

## 2021-09-24 NOTE — PROGRESS NOTES
ANTICOAGULATION MANAGEMENT     Jimi Moreno 76 year old male is on warfarin with therapeutic INR result. (Goal INR 2.0-3.0)    Recent labs: (last 7 days)     09/24/21  0811   INR 2.1*       ASSESSMENT     Source(s): Chart Review and Patient/Caregiver Call       Warfarin doses taken: Warfarin taken as instructed    Diet: Increased greens/vitamin K in diet; ongoing change    New illness, injury, or hospitalization: No    Medication/supplement changes: None noted    Signs or symptoms of bleeding or clotting: No    Previous INR: Therapeutic last visit; previously outside of goal range    Additional findings: None     PLAN     Recommended plan for no diet, medication or health factor changes affecting INR     Dosing Instructions: Continue your current warfarin dose with next INR in 4 weeks       Summary  As of 9/24/2021    Full warfarin instructions:  7.5 mg every Mon, Fri; 5 mg all other days   Next INR check:  10/22/2021             Telephone call with Jimi who verbalizes understanding and agrees to plan    Lab visit scheduled    Education provided: None required    Plan made per [facility] anticoagulation protocol    Jyotsna Alcantara RN  Anticoagulation Clinic  9/24/2021    _______________________________________________________________________     Anticoagulation Episode Summary     Current INR goal:  2.0-3.0   TTR:  75.7 % (1 y)   Target end date:  Indefinite   Send INR reminders to:  [facility]    Indications    Atrial fibrillation with RVR (H) [I48.91]           Comments:           Anticoagulation Care Providers     Provider Role Specialty Phone number    Francia Finnegan NP Referring Nurse Practitioner - Family [phone]    Nikki Alarcon APRN CNP Referring Nurse Practitioner - Adult Health [phone]

## 2021-10-22 ENCOUNTER — LAB (OUTPATIENT)
Dept: LAB | Facility: CLINIC | Age: 76
End: 2021-10-22
Payer: COMMERCIAL

## 2021-10-22 ENCOUNTER — ANTICOAGULATION THERAPY VISIT (OUTPATIENT)
Dept: ANTICOAGULATION | Facility: CLINIC | Age: 76
End: 2021-10-22

## 2021-10-22 DIAGNOSIS — I48.91 ATRIAL FIBRILLATION WITH RVR (H): Primary | ICD-10-CM

## 2021-10-22 DIAGNOSIS — I48.91 ATRIAL FIBRILLATION WITH RVR (H): ICD-10-CM

## 2021-10-22 LAB — INR BLD: 1.5 (ref 0.9–1.1)

## 2021-10-22 PROCEDURE — 36416 COLLJ CAPILLARY BLOOD SPEC: CPT

## 2021-10-22 PROCEDURE — 85610 PROTHROMBIN TIME: CPT

## 2021-10-22 NOTE — PROGRESS NOTES
ANTICOAGULATION MANAGEMENT     Jimi Moreno 76 year old male is on warfarin with subtherapeutic INR result. (Goal INR 2.0-3.0)    Recent labs: (last 7 days)     10/22/21  0820   INR 1.5*       ASSESSMENT     Source(s): Chart Review and Patient/Caregiver Call       Warfarin doses taken: Warfarin taken as instructed    Diet: No new diet changes identified    New illness, injury, or hospitalization: No    Medication/supplement changes: None noted    Signs or symptoms of bleeding or clotting: No    Previous INR: Therapeutic last visit; previously outside of goal range    Additional findings: None     PLAN     Recommended plan for no diet, medication or health factor changes affecting INR     Dosing Instructions: Continue your current warfarin dose with next INR in 2 weeks       Summary  As of 10/22/2021    Full warfarin instructions:  7.5 mg every Mon, Fri; 5 mg all other days   Next INR check:               Telephone call with Jimi who verbalizes understanding and agrees to plan    Lab visit scheduled    Education provided: Please call back if any changes to your diet, medications or how you've been taking warfarin    Plan made per [facility] anticoagulation protocol    Jyotsna Alcantara RN  Anticoagulation Clinic  10/22/2021    _______________________________________________________________________     Anticoagulation Episode Summary     Current INR goal:  2.0-3.0   TTR:  69.3 % (1 y)   Target end date:  Indefinite   Send INR reminders to:  [facility]    Indications    Atrial fibrillation with RVR (H) [I48.91]           Comments:           Anticoagulation Care Providers     Provider Role Specialty Phone number    Francia Finnegan NP Referring Nurse Practitioner - Family [phone]    Nikki Alarcon APRN CNP Referring Nurse Practitioner - Adult Health [phone]

## 2021-10-27 ENCOUNTER — OFFICE VISIT (OUTPATIENT)
Dept: FAMILY MEDICINE | Facility: CLINIC | Age: 76
End: 2021-10-27
Payer: COMMERCIAL

## 2021-10-27 VITALS — HEIGHT: 74 IN | WEIGHT: 233.38 LBS | BODY MASS INDEX: 29.95 KG/M2

## 2021-10-27 DIAGNOSIS — R53.83 FATIGUE, UNSPECIFIED TYPE: ICD-10-CM

## 2021-10-27 DIAGNOSIS — I10 ESSENTIAL HYPERTENSION WITH GOAL BLOOD PRESSURE LESS THAN 140/90: ICD-10-CM

## 2021-10-27 DIAGNOSIS — R73.01 IMPAIRED FASTING GLUCOSE: ICD-10-CM

## 2021-10-27 DIAGNOSIS — N40.0 ENLARGED PROSTATE: ICD-10-CM

## 2021-10-27 DIAGNOSIS — Z13.6 CARDIOVASCULAR SCREENING; LDL GOAL LESS THAN 100: ICD-10-CM

## 2021-10-27 DIAGNOSIS — I48.20 CHRONIC ATRIAL FIBRILLATION (H): Primary | ICD-10-CM

## 2021-10-27 DIAGNOSIS — N40.1 BENIGN PROSTATIC HYPERPLASIA WITH LOWER URINARY TRACT SYMPTOMS, SYMPTOM DETAILS UNSPECIFIED: ICD-10-CM

## 2021-10-27 LAB
ALBUMIN SERPL-MCNC: 3.9 G/DL (ref 3.4–5)
ALP SERPL-CCNC: 79 U/L (ref 40–150)
ALT SERPL W P-5'-P-CCNC: 31 U/L (ref 0–70)
ANION GAP SERPL CALCULATED.3IONS-SCNC: 3 MMOL/L (ref 3–14)
AST SERPL W P-5'-P-CCNC: 14 U/L (ref 0–45)
BASOPHILS # BLD AUTO: 0 10E3/UL (ref 0–0.2)
BASOPHILS NFR BLD AUTO: 0 %
BILIRUB SERPL-MCNC: 0.7 MG/DL (ref 0.2–1.3)
BUN SERPL-MCNC: 17 MG/DL (ref 7–30)
CALCIUM SERPL-MCNC: 8.9 MG/DL (ref 8.5–10.1)
CHLORIDE BLD-SCNC: 112 MMOL/L (ref 94–109)
CHOLEST SERPL-MCNC: 163 MG/DL
CO2 SERPL-SCNC: 25 MMOL/L (ref 20–32)
CREAT SERPL-MCNC: 1.26 MG/DL (ref 0.66–1.25)
EOSINOPHIL # BLD AUTO: 0.1 10E3/UL (ref 0–0.7)
EOSINOPHIL NFR BLD AUTO: 1 %
ERYTHROCYTE [DISTWIDTH] IN BLOOD BY AUTOMATED COUNT: 13.3 % (ref 10–15)
FASTING STATUS PATIENT QL REPORTED: YES
GFR SERPL CREATININE-BSD FRML MDRD: 54 ML/MIN/1.73M2
GLUCOSE BLD-MCNC: 121 MG/DL (ref 70–99)
HBA1C MFR BLD: 5.5 % (ref 0–5.6)
HCT VFR BLD AUTO: 47.4 % (ref 40–53)
HDLC SERPL-MCNC: 48 MG/DL
HGB BLD-MCNC: 15.9 G/DL (ref 13.3–17.7)
LDLC SERPL CALC-MCNC: 96 MG/DL
LYMPHOCYTES # BLD AUTO: 1.2 10E3/UL (ref 0.8–5.3)
LYMPHOCYTES NFR BLD AUTO: 19 %
MCH RBC QN AUTO: 31 PG (ref 26.5–33)
MCHC RBC AUTO-ENTMCNC: 33.5 G/DL (ref 31.5–36.5)
MCV RBC AUTO: 92 FL (ref 78–100)
MONOCYTES # BLD AUTO: 0.7 10E3/UL (ref 0–1.3)
MONOCYTES NFR BLD AUTO: 11 %
NEUTROPHILS # BLD AUTO: 4.5 10E3/UL (ref 1.6–8.3)
NEUTROPHILS NFR BLD AUTO: 69 %
NONHDLC SERPL-MCNC: 115 MG/DL
PLATELET # BLD AUTO: 178 10E3/UL (ref 150–450)
POTASSIUM BLD-SCNC: 4.5 MMOL/L (ref 3.4–5.3)
PROT SERPL-MCNC: 7.1 G/DL (ref 6.8–8.8)
RBC # BLD AUTO: 5.13 10E6/UL (ref 4.4–5.9)
SODIUM SERPL-SCNC: 140 MMOL/L (ref 133–144)
TRIGL SERPL-MCNC: 97 MG/DL
TSH SERPL DL<=0.005 MIU/L-ACNC: 2.03 MU/L (ref 0.4–4)
WBC # BLD AUTO: 6.5 10E3/UL (ref 4–11)

## 2021-10-27 PROCEDURE — 80061 LIPID PANEL: CPT | Performed by: FAMILY MEDICINE

## 2021-10-27 PROCEDURE — 84443 ASSAY THYROID STIM HORMONE: CPT | Performed by: FAMILY MEDICINE

## 2021-10-27 PROCEDURE — 80053 COMPREHEN METABOLIC PANEL: CPT | Performed by: FAMILY MEDICINE

## 2021-10-27 PROCEDURE — 36415 COLL VENOUS BLD VENIPUNCTURE: CPT | Performed by: FAMILY MEDICINE

## 2021-10-27 PROCEDURE — 83036 HEMOGLOBIN GLYCOSYLATED A1C: CPT | Performed by: FAMILY MEDICINE

## 2021-10-27 PROCEDURE — 99214 OFFICE O/P EST MOD 30 MIN: CPT | Performed by: FAMILY MEDICINE

## 2021-10-27 PROCEDURE — 85025 COMPLETE CBC W/AUTO DIFF WBC: CPT | Performed by: FAMILY MEDICINE

## 2021-10-27 RX ORDER — FINASTERIDE 5 MG/1
1 TABLET, FILM COATED ORAL DAILY
Qty: 90 TABLET | Refills: 3 | Status: SHIPPED | OUTPATIENT
Start: 2021-10-27 | End: 2022-10-13

## 2021-10-27 RX ORDER — METOPROLOL SUCCINATE 50 MG/1
50 TABLET, EXTENDED RELEASE ORAL DAILY
Qty: 90 TABLET | Refills: 3 | Status: SHIPPED | OUTPATIENT
Start: 2021-10-27 | End: 2022-10-13

## 2021-10-27 RX ORDER — DILTIAZEM HYDROCHLORIDE 360 MG/1
360 CAPSULE, EXTENDED RELEASE ORAL DAILY
Qty: 90 CAPSULE | Refills: 3 | Status: SHIPPED | OUTPATIENT
Start: 2021-10-27 | End: 2022-10-13

## 2021-10-27 RX ORDER — TAMSULOSIN HYDROCHLORIDE 0.4 MG/1
CAPSULE ORAL
Qty: 180 CAPSULE | Refills: 3 | Status: SHIPPED | OUTPATIENT
Start: 2021-10-27 | End: 2022-10-13

## 2021-10-27 ASSESSMENT — MIFFLIN-ST. JEOR: SCORE: 1840.39

## 2021-10-27 NOTE — PROGRESS NOTES
Renan Krause is a 76 year old who presents for the following health issues     HPI     Hypertension Follow-up      Do you check your blood pressure regularly outside of the clinic? No     Are you following a low salt diet? Yes    Are your blood pressures ever more than 140 on the top number (systolic) OR more   than 90 on the bottom number (diastolic), for example 140/90?       How many servings of fruits and vegetables do you eat daily?  0-1    On average, how many sweetened beverages do you drink each day (Examples: soda, juice, sweet tea, etc.  Do NOT count diet or artificially sweetened beverages)?   0    How many days per week do you exercise enough to make your heart beat faster?     How many minutes a day do you exercise enough to make your heart beat faster?     How many days per week do you miss taking your medication? 0         Review of Systems   Always has active sinus    Coughs some    No chest pain    No change in breathing    Not much walking for exercise    Hard to leave wife for more than 1/2     Still live in own house    For now they want to stay in house    Been there 50 year; wife wants to move       Objective    There were no vitals taken for this visit.  There is no height or weight on file to calculate BMI.  Physical Exam  Constitutional:       Appearance: He is well-developed.   HENT:      Head: Normocephalic and atraumatic.   Eyes:      Conjunctiva/sclera: Conjunctivae normal.   Neck:      Vascular: No carotid bruit.   Cardiovascular:      Rate and Rhythm: Normal rate.      Heart sounds: Normal heart sounds.      Comments: Irregularly irregular  Pulmonary:      Effort: Pulmonary effort is normal. No respiratory distress.      Breath sounds: Normal breath sounds.   Neurological:      Mental Status: He is alert and oriented to person, place, and time.      Cranial Nerves: No cranial nerve deficit.   Psychiatric:         Speech: Speech normal.         Behavior: Behavior normal.       mild pitting edema pretibial, bilat/ symmetric             ASSESSMENT / PLAN:  (I48.20) Chronic atrial fibrillation (H)  (primary encounter diagnosis)  Comment: refill meds; rate stable and he is on warfarin, gets inr checked regularly.   Plan: diltiazem ER COATED BEADS (CARDIZEM CD) 360 MG         24 hr capsule, metoprolol succinate ER         (TOPROL-XL) 50 MG 24 hr tablet             (I10) Essential hypertension with goal blood pressure less than 140/90  Comment: blood pressure at goal   Plan: no change     (Z13.6) CARDIOVASCULAR SCREENING; LDL GOAL LESS THAN 100  Comment: check labs fasting   Plan: Lipid panel reflex to direct LDL Fasting             (R73.01) Impaired fasting glucose  Comment: check , barely into prediabetes range when last checked  Plan: Hemoglobin A1c             (R53.83) Fatigue, unspecified type  Comment: check   Plan: TSH with free T4 reflex, CBC with Platelets &         Differential, Comprehensive metabolic panel             (N40.0) Enlarged prostate  Comment: refill ; urination okay   Plan: finasteride (PROSCAR) 5 MG tablet             (N40.1) Benign prostatic hyperplasia with lower urinary tract symptoms, symptom details unspecified  Comment: refill this also   Plan: tamsulosin (FLOMAX) 0.4 MG capsule        Combo of this and finasteride working well      I reviewed the patient's medications, allergies, medical history, family history, and social history.    Rodolfo Pedersen MD

## 2021-10-27 NOTE — PATIENT INSTRUCTIONS
Keep working on healthy diet/exercise     We will send you lab results    Stay on same medications

## 2021-10-28 NOTE — RESULT ENCOUNTER NOTE
Kidney test ( creatinine ) is up just a bit.  Stay well hydrated.    The hemoglobin a1c is back into normal range ( a year ago was barely into prediabetes range )     Cholesterol is excellent.    Other labs are fine.    Rodolfo Pedersen MD

## 2021-11-05 ENCOUNTER — ANTICOAGULATION THERAPY VISIT (OUTPATIENT)
Dept: ANTICOAGULATION | Facility: CLINIC | Age: 76
End: 2021-11-05

## 2021-11-05 ENCOUNTER — LAB (OUTPATIENT)
Dept: LAB | Facility: CLINIC | Age: 76
End: 2021-11-05
Payer: COMMERCIAL

## 2021-11-05 DIAGNOSIS — I48.91 ATRIAL FIBRILLATION WITH RVR (H): ICD-10-CM

## 2021-11-05 DIAGNOSIS — I48.91 ATRIAL FIBRILLATION WITH RVR (H): Primary | ICD-10-CM

## 2021-11-05 LAB — INR BLD: 2 (ref 0.9–1.1)

## 2021-11-05 PROCEDURE — 85610 PROTHROMBIN TIME: CPT

## 2021-11-05 PROCEDURE — 36416 COLLJ CAPILLARY BLOOD SPEC: CPT

## 2021-11-05 NOTE — PROGRESS NOTES
ANTICOAGULATION MANAGEMENT     Jimi Moreno 76 year old male is on warfarin with therapeutic INR result. (Goal INR 2.0-3.0)    Recent labs: (last 7 days)     11/05/21  0802   INR 2.0*       ASSESSMENT     Source(s): Chart Review and Patient/Caregiver Call       Warfarin doses taken: Warfarin taken as instructed    Diet: No new diet changes identified    New illness, injury, or hospitalization: No    Medication/supplement changes: None noted    Signs or symptoms of bleeding or clotting: No    Previous INR: Subtherapeutic    Additional findings: None     PLAN     Recommended plan for no diet, medication or health factor changes affecting INR     Dosing Instructions:  Increase your warfarin dose (6% change) with next INR in 2 weeks     Has been on low range or low so did small increase to get more in the middle of range and will recheck inr in 2 weeks  Summary  As of 11/5/2021    Full warfarin instructions:  7.5 mg every Mon, Fri; 5 mg all other days   Next INR check:               Telephone call with Jimi who verbalizes understanding and agrees to plan    Lab visit scheduled    Education provided: Please call back if any changes to your diet, medications or how you've been taking warfarin    Plan made per [facility] anticoagulation protocol    Jyotsna Alcantara, RN  Anticoagulation Clinic  11/5/2021    _______________________________________________________________________     Anticoagulation Episode Summary     Current INR goal:  2.0-3.0   TTR:  65.5 % (1 y)   Target end date:  Indefinite   Send INR reminders to:  [facility]    Indications    Atrial fibrillation with RVR (H) [I48.91]           Comments:           Anticoagulation Care Providers     Provider Role Specialty Phone number    Francia Finnegan NP Referring Nurse Practitioner - Family [phone]    Nikki Alarcon APRN CNP Referring Nurse Practitioner - Adult Health [phone]

## 2021-11-18 ENCOUNTER — TELEPHONE (OUTPATIENT)
Dept: FAMILY MEDICINE | Facility: CLINIC | Age: 76
End: 2021-11-18
Payer: COMMERCIAL

## 2021-11-18 DIAGNOSIS — I48.91 ATRIAL FIBRILLATION WITH RVR (H): Primary | ICD-10-CM

## 2021-11-18 NOTE — TELEPHONE ENCOUNTER
Patient called to cancel appointment today due to exposure to Covid.  Patient rescheduled 12/03 however, would like a call back regarding dosing.    Please advise    Huong Viera

## 2021-11-18 NOTE — TELEPHONE ENCOUNTER
Returned call to patient, advised to continue with warfarin maintenance plan of 7.5mg every MWF; 5mg all other days of the week until recheck on 12/3/21.     Zacarias Feliciano RN

## 2021-12-03 ENCOUNTER — LAB (OUTPATIENT)
Dept: LAB | Facility: CLINIC | Age: 76
End: 2021-12-03
Payer: COMMERCIAL

## 2021-12-03 ENCOUNTER — ANTICOAGULATION THERAPY VISIT (OUTPATIENT)
Dept: ANTICOAGULATION | Facility: CLINIC | Age: 76
End: 2021-12-03

## 2021-12-03 DIAGNOSIS — I48.91 ATRIAL FIBRILLATION WITH RVR (H): Primary | ICD-10-CM

## 2021-12-03 DIAGNOSIS — I48.91 ATRIAL FIBRILLATION WITH RVR (H): ICD-10-CM

## 2021-12-03 LAB — INR BLD: 3.1 (ref 0.9–1.1)

## 2021-12-03 PROCEDURE — 85610 PROTHROMBIN TIME: CPT

## 2021-12-03 PROCEDURE — 36416 COLLJ CAPILLARY BLOOD SPEC: CPT

## 2021-12-03 NOTE — PROGRESS NOTES
ANTICOAGULATION MANAGEMENT     Jimi Moreno 76 year old male is on warfarin with supratherapeutic INR result. (Goal INR 2.0-3.0)    Recent labs: (last 7 days)     12/03/21  0744   INR 3.1*       ASSESSMENT     Source(s): Chart Review and Patient/Caregiver Call       Warfarin doses taken: Warfarin taken as instructed    Diet: No new diet changes identified    New illness, injury, or hospitalization: No    Medication/supplement changes: None noted    Signs or symptoms of bleeding or clotting: No    Previous INR: Therapeutic last visit; previously outside of goal range    Additional findings: had history of coming in low did small dose increase then he had covid symptoms so didnt come in for recheck and now is high likely from covid symptoms.     PLAN     Recommended plan for no diet, medication or health factor changes affecting INR     Dosing Instructions: Continue your current warfarin dose with next INR in 2 weeks   Will increase some greens    Summary  As of 12/3/2021    Full warfarin instructions:  7.5 mg every Mon, Wed, Fri; 5 mg all other days   Next INR check:               Telephone call with Jimi who verbalizes understanding and agrees to plan    Lab visit scheduled    Education provided: Please call back if any changes to your diet, medications or how you've been taking warfarin report any signs of bleeding.    Plan made per [facility]agulation protocol    Jyotsna Alcantara RN  Anticoagulation Clinic  12/3/2021    _______________________________________________________________________     Anticoagulation Episode Summary     Current INR goal:  2.0-3.0   TTR:  64.8 % (1 y)   Target end date:  Indefinite   Send INR reminders to:  [facility]    Indications    Atrial fibrillation with RVR (H) [I48.91]           Comments:           Anticoagulation Care Providers     Provider Role Specialty Phone number    Francia Finnegan, NP Referring Nurse Practitioner - Family [phone]    Nikki Alarcon  PANCHO Dietz CNP Referring Nurse Practitioner - Adult Health [phone]

## 2021-12-17 ENCOUNTER — ANTICOAGULATION THERAPY VISIT (OUTPATIENT)
Dept: ANTICOAGULATION | Facility: CLINIC | Age: 76
End: 2021-12-17

## 2021-12-17 ENCOUNTER — LAB (OUTPATIENT)
Dept: LAB | Facility: CLINIC | Age: 76
End: 2021-12-17
Payer: COMMERCIAL

## 2021-12-17 DIAGNOSIS — I48.91 ATRIAL FIBRILLATION WITH RVR (H): ICD-10-CM

## 2021-12-17 DIAGNOSIS — I48.91 ATRIAL FIBRILLATION WITH RVR (H): Primary | ICD-10-CM

## 2021-12-17 LAB — INR BLD: 2.7 (ref 0.9–1.1)

## 2021-12-17 PROCEDURE — 85610 PROTHROMBIN TIME: CPT

## 2021-12-17 PROCEDURE — 36416 COLLJ CAPILLARY BLOOD SPEC: CPT

## 2021-12-17 RX ORDER — WARFARIN SODIUM 5 MG/1
TABLET ORAL
Qty: 120 TABLET | Refills: 1 | Status: SHIPPED | OUTPATIENT
Start: 2021-12-17 | End: 2022-06-13

## 2021-12-17 NOTE — PROGRESS NOTES
ANTICOAGULATION MANAGEMENT     Jimi Moreno 76 year old male is on warfarin with therapeutic INR result. (Goal INR 2.0-3.0)    Recent labs: (last 7 days)     12/17/21  0744   INR 2.7*       ASSESSMENT     Source(s): Chart Review and Patient/Caregiver Call       Warfarin doses taken: Warfarin taken as instructed    Diet: No new diet changes identified    New illness, injury, or hospitalization: No    Medication/supplement changes: None noted    Signs or symptoms of bleeding or clotting: No    Previous INR: Supratherapeutic    Additional findings: Refill needed today     PLAN     Recommended plan for no diet, medication or health factor changes affecting INR     Dosing Instructions: Continue your current warfarin dose with next INR in 4 weeks       Summary  As of 12/17/2021    Full warfarin instructions:  7.5 mg every Mon, Wed, Fri; 5 mg all other days   Next INR check:  1/14/2022             Telephone call with Jimi who verbalizes understanding and agrees to plan    Lab visit scheduled    Education provided: None required    Plan made per [facility] anticoagulation protocol    Angelina Duncan RN  Anticoagulation Clinic  12/17/2021    _______________________________________________________________________     Anticoagulation Episode Summary     Current INR goal:  2.0-3.0   TTR:  63.8 % (1 y)   Target end date:  Indefinite   Send INR reminders to:  [facility] [place]    Indications    Atrial fibrillation with RVR (H) [I48.91]           Comments:           Anticoagulation Care Providers     Provider Role Specialty Phone number    Francia Finnegan NP Referring Nurse Practitioner - Family [phone]    Nikki Alarcon APRN CNP Referring Nurse Practitioner - Adult Health [phone]

## 2022-01-14 ENCOUNTER — LAB (OUTPATIENT)
Dept: LAB | Facility: CLINIC | Age: 77
End: 2022-01-14
Payer: COMMERCIAL

## 2022-01-14 ENCOUNTER — ANTICOAGULATION THERAPY VISIT (OUTPATIENT)
Dept: ANTICOAGULATION | Facility: CLINIC | Age: 77
End: 2022-01-14

## 2022-01-14 DIAGNOSIS — I48.91 ATRIAL FIBRILLATION WITH RVR (H): ICD-10-CM

## 2022-01-14 DIAGNOSIS — I48.91 ATRIAL FIBRILLATION WITH RVR (H): Primary | ICD-10-CM

## 2022-01-14 LAB — INR BLD: 2.9 (ref 0.9–1.1)

## 2022-01-14 PROCEDURE — 85610 PROTHROMBIN TIME: CPT

## 2022-01-14 PROCEDURE — 36416 COLLJ CAPILLARY BLOOD SPEC: CPT

## 2022-01-14 NOTE — PROGRESS NOTES
ANTICOAGULATION MANAGEMENT     Jimi Moreno 77 year old male is on warfarin with therapeutic INR result. (Goal INR 2.0-3.0)    Recent labs: (last 7 days)     01/14/22  0737   INR 2.9*       ASSESSMENT     Source(s): Chart Review and Patient/Caregiver Call       Warfarin doses taken: Warfarin taken as instructed    Diet: No new diet changes identified    New illness, injury, or hospitalization: No    Medication/supplement changes: None noted    Signs or symptoms of bleeding or clotting: No    Previous INR: Therapeutic last 2(+) visits    Additional findings: None     PLAN     Recommended plan for no diet, medication or health factor changes affecting INR     Dosing Instructions: Continue your current warfarin dose with next INR in 4 weeks       Summary  As of 1/14/2022    Full warfarin instructions:  7.5 mg every Mon, Wed, Fri; 5 mg all other days   Next INR check:  2/11/2022             Telephone call with Jimi who verbalizes understanding and agrees to plan    Lab visit scheduled    Education provided: Goal range and significance of current result    Plan made per [facility] anticoagulation protocol    Aby Jung RN  Anticoagulation Clinic  1/14/2022    _______________________________________________________________________     Anticoagulation Episode Summary     Current INR goal:  2.0-3.0   TTR:  63.8 % (1 y)   Target end date:  Indefinite   Send INR reminders to:  [facility] [place]    Indications    Atrial fibrillation with RVR (H) [I48.91]           Comments:           Anticoagulation Care Providers     Provider Role Specialty Phone number    Francia Finnegan NP Referring Nurse Practitioner - Family [phone]    Nikki Alarcon APRN CNP Referring Nurse Practitioner - Adult Health [phone]

## 2022-01-14 NOTE — PROGRESS NOTES
Anticoagulation Management    Unable to reach Jimi today.    Today's INR result of 2.9 is therapeutic (goal INR of 2.0-3.0).  Result received from: Clinic Lab    Follow up required to confirm warfarin dose taken and assess for changes    No instructions provided. Unable to leave voicemail.      Anticoagulation clinic to follow up    Aby Jung RN

## 2022-02-11 ENCOUNTER — LAB (OUTPATIENT)
Dept: LAB | Facility: CLINIC | Age: 77
End: 2022-02-11
Payer: COMMERCIAL

## 2022-02-11 ENCOUNTER — ANTICOAGULATION THERAPY VISIT (OUTPATIENT)
Dept: ANTICOAGULATION | Facility: CLINIC | Age: 77
End: 2022-02-11

## 2022-02-11 DIAGNOSIS — I48.91 ATRIAL FIBRILLATION WITH RVR (H): Primary | ICD-10-CM

## 2022-02-11 DIAGNOSIS — Z11.59 NEED FOR HEPATITIS C SCREENING TEST: Primary | ICD-10-CM

## 2022-02-11 DIAGNOSIS — I48.91 ATRIAL FIBRILLATION WITH RVR (H): ICD-10-CM

## 2022-02-11 LAB — INR BLD: 2.6 (ref 0.9–1.1)

## 2022-02-11 PROCEDURE — 85610 PROTHROMBIN TIME: CPT

## 2022-02-11 PROCEDURE — 36416 COLLJ CAPILLARY BLOOD SPEC: CPT

## 2022-02-11 NOTE — PROGRESS NOTES
ANTICOAGULATION MANAGEMENT     Jimi Moreno 77 year old male is on warfarin with therapeutic INR result. (Goal INR 2.0-3.0)    Recent labs: (last 7 days)     02/11/22  0739   INR 2.6*       ASSESSMENT     Source(s): Chart Review and Patient/Caregiver Call       Warfarin doses taken: Warfarin taken as instructed    Diet: No new diet changes identified    New illness, injury, or hospitalization: No    Medication/supplement changes: None noted    Signs or symptoms of bleeding or clotting: No    Previous INR: Therapeutic last 2(+) visits    Additional findings: None     PLAN     Recommended plan for no diet, medication or health factor changes affecting INR     Dosing Instructions: Continue your current warfarin dose with next INR in 5 weeks       Summary  As of 2/11/2022    Full warfarin instructions:  7.5 mg every Mon, Wed, Fri; 5 mg all other days   Next INR check:  3/18/2022             Telephone call with Jimi who verbalizes understanding and agrees to plan    Lab visit scheduled    Education provided: Goal range and significance of current result    Plan made per [facility] anticoagulation protocol    Aby Jung RN  Anticoagulation Clinic  2/11/2022    _______________________________________________________________________     Anticoagulation Episode Summary     Current INR goal:  2.0-3.0   TTR:  63.8 % (1 y)   Target end date:  Indefinite   Send INR reminders to:  [facility] [place]    Indications    Atrial fibrillation with RVR (H) [I48.91]           Comments:           Anticoagulation Care Providers     Provider Role Specialty Phone number    Francia Finnegan NP Referring Nurse Practitioner - Family [phone]    Nikki Alarcon APRN CNP Referring Nurse Practitioner - Adult Health [phone]

## 2022-02-11 NOTE — PROGRESS NOTES
Anticoagulation Management    Unable to reach patient today.    Today's INR result of 2.6 is therapeutic (goal INR of 2.0-3.0).  Result received from: Clinic Lab    Follow up required to confirm warfarin dose taken and assess for changes    No instructions provided. Unable to leave voicemail.      Anticoagulation clinic to follow up    Aby Jung RN

## 2022-03-18 ENCOUNTER — LAB (OUTPATIENT)
Dept: LAB | Facility: CLINIC | Age: 77
End: 2022-03-18
Payer: COMMERCIAL

## 2022-03-18 ENCOUNTER — ANTICOAGULATION THERAPY VISIT (OUTPATIENT)
Dept: ANTICOAGULATION | Facility: CLINIC | Age: 77
End: 2022-03-18

## 2022-03-18 DIAGNOSIS — I48.91 ATRIAL FIBRILLATION WITH RVR (H): ICD-10-CM

## 2022-03-18 DIAGNOSIS — I48.91 ATRIAL FIBRILLATION WITH RVR (H): Primary | ICD-10-CM

## 2022-03-18 LAB — INR BLD: 2.2 (ref 0.9–1.1)

## 2022-03-18 PROCEDURE — 85610 PROTHROMBIN TIME: CPT

## 2022-03-18 PROCEDURE — 36416 COLLJ CAPILLARY BLOOD SPEC: CPT

## 2022-03-18 NOTE — PROGRESS NOTES
ANTICOAGULATION MANAGEMENT     Jimi Moreno 77 year old male is on warfarin with therapeutic INR result. (Goal INR 2.0-3.0)    Recent labs: (last 7 days)     03/18/22  0745   INR 2.2*       ASSESSMENT       Source(s): Chart Review       Warfarin doses taken: Warfarin taken as instructed    Diet: No new diet changes identified    New illness, injury, or hospitalization: No    Medication/supplement changes: None noted    Signs or symptoms of bleeding or clotting: No    Previous INR: Therapeutic last 2(+) visits    Additional findings: None       PLAN     Recommended plan for no diet, medication or health factor changes affecting INR     Dosing Instructions: Continue your current warfarin dose with next INR in 6 weeks       Summary  As of 3/18/2022    Full warfarin instructions:  7.5 mg every Mon, Wed, Fri; 5 mg all other days   Next INR check:               Telephone call with Jimi who verbalizes understanding and agrees to plan    Lab visit scheduled    Education provided: Please call back if any changes to your diet, medications or how you've been taking warfarin    Plan made per [facility] anticoagulation protocol    Jyotsna Alcantara, RN  Anticoagulation Clinic  3/18/2022    _______________________________________________________________________     Anticoagulation Episode Summary     Current INR goal:  2.0-3.0   TTR:  63.8 % (1 y)   Target end date:  Indefinite   Send INR reminders to:  [facility]    Indications    Atrial fibrillation with RVR (H) [I48.91]           Comments:           Anticoagulation Care Providers     Provider Role Specialty Phone number    Francia Finnegan NP Referring Nurse Practitioner - Family [phone]    Nikki Alarcon APRN CNP Referring Nurse Practitioner - Adult Health [phone]

## 2022-03-29 ENCOUNTER — OFFICE VISIT (OUTPATIENT)
Dept: OTHER | Facility: CLINIC | Age: 77
End: 2022-03-29
Payer: COMMERCIAL

## 2022-03-29 VITALS
OXYGEN SATURATION: 96 % | HEIGHT: 74 IN | DIASTOLIC BLOOD PRESSURE: 87 MMHG | TEMPERATURE: 97.6 F | SYSTOLIC BLOOD PRESSURE: 130 MMHG | WEIGHT: 235 LBS | BODY MASS INDEX: 30.16 KG/M2 | HEART RATE: 70 BPM | RESPIRATION RATE: 16 BRPM

## 2022-03-29 DIAGNOSIS — Z00.00 ENCOUNTER FOR MEDICARE ANNUAL WELLNESS EXAM: Primary | ICD-10-CM

## 2022-03-29 PROCEDURE — G0439 PPPS, SUBSEQ VISIT: HCPCS | Performed by: FAMILY MEDICINE

## 2022-03-29 ASSESSMENT — ACTIVITIES OF DAILY LIVING (ADL): CURRENT_FUNCTION: NO ASSISTANCE NEEDED

## 2022-03-29 ASSESSMENT — PAIN SCALES - GENERAL: PAINLEVEL: NO PAIN (0)

## 2022-03-29 NOTE — PROGRESS NOTES
"Assessment & Plan     ICD-10-CM    1. Encounter for Medicare annual wellness exam  Z00.00        Follow Up/Next Steps    Return in about 53 weeks (around 4/4/2023) for Annual Wellness Visit.  Recommended that patient follow up with PCP to address the following : Overdue for zoster immunization and covid-19 vaccine. recommend to follow up with PCP to manage chronic conditions.    Counseling and Education  Reviewed preventive health counseling, as reflected in patient instructions      BMI:   Estimated body mass index is 30.58 kg/m  as calculated from the following:    Height as of this encounter: 1.867 m (6' 1.5\").    Weight as of this encounter: 106.6 kg (235 lb).   Weight management plan: Discussed healthy diet and exercise guidelines      Appropriate preventive services were discussed with this patient, including applicable screening as appropriate for cardiovascular disease, diabetes, osteopenia/osteoporosis, and glaucoma.  As appropriate for age/gender, discussed screening for colorectal cancer, prostate cancer, breast cancer, and cervical cancer. Checklist reviewing preventive services available has been given to the patient.    Reviewed patients plan of care and provided an AVS. The Basic Care Plan (routine screening as documented in Health Maintenance) for Jimi meets the Care Plan requirement. This Care Plan has been established and reviewed with the Patient.    Visit Provider: Jose Maravilla RN  Supervising Provider: Sravanthi Ybarra MD, MD  [facility]   Jimi is a 77 year old who is being seen for an Annual Wellness Visit  accompanied by his spouse.    Healthy Habits:     In general, how would you rate your overall health?  Good    Frequency of exercise:  None    Do you usually eat at least 4 servings of fruit and vegetables a day, include whole grains    & fiber and avoid regularly eating high fat or \"junk\" foods?  No    Taking medications " regularly:  Yes    Medication side effects:  None    Ability to successfully perform activities of daily living:  No assistance needed    Home Safety:  Lack of grab bars in the bathroom and lack of handrails on stairs    Hearing Impairment:  Difficulty following a conversation in a noisy restaurant or crowded room, feel that people are mumbling or not speaking clearly, difficulty following dialogue in the theater, need to ask people to speak up or repeat themselves and difficulty understanding soft or whispered speech    In the past 6 months, have you been bothered by leaking of urine?  No    In general, how would you rate your overall mental or emotional health?  Good      PHQ-2 Total Score: 0    Additional concerns today:  No    Do you feel safe in your environment? Yes    Have you ever done Advance Care Planning? (For example, a Health Directive, POLST, or a discussion with a medical provider or your loved ones about your wishes): No, advance care planning information given to patient to review.  Advanced care planning was discussed at today's visit.    Fall risk  Fallen 2 or more times in the past year?: No  Any fall with injury in the past year?: No  Cognitive Screening   1) Repeat 3 items (Leader, Season, Table)    2) Clock draw: NORMAL  3) 3 item recall: Recalls 3 objects  Results: 3 items recalled: COGNITIVE IMPAIRMENT LESS LIKELY    Mini-CogTM Copyright [employer]. Licensed by the author for use in [facility]; reprinted with permission (mark@.[facility]). All rights reserved.      Do you have sleep apnea, excessive snoring or daytime drowsiness?: no    Reviewed and updated as needed this visit by clinical staff    Allergies  Meds  Problems             Social History     Tobacco Use     Smoking status: Never Smoker     Smokeless tobacco: Never Used   Substance Use Topics     Alcohol use: Yes     Comment: 2  drinks daily       Current providers sharing in care for this patient include:   Patient  "Care Team:  No Ref-Primary, Physician as PCP - Siva Trejo MD as Assigned Heart and Vascular Provider  Rodolfo Pedersen MD as Assigned PCP    The following health maintenance items are reviewed in Epic and correct as of today:  Health Maintenance Due   Topic Date Due     HF ACTION PLAN  Never done     HEPATITIS C SCREENING  Never done     ZOSTER IMMUNIZATION (1 of 2) Never done     FALL RISK ASSESSMENT  08/11/2021     COVID-19 Vaccine (2 - Booster for Nicole series) 09/23/2021     BMP  04/27/2022               Objective    /87 (BP Location: Left arm, Patient Position: Sitting, Cuff Size: Adult Regular)   Pulse 70   Temp 97.6  F (36.4  C)   Resp 16   Ht 1.867 m (6' 1.5\")   Wt 106.6 kg (235 lb)   SpO2 96%   BMI 30.58 kg/m   Estimated body mass index is 30.58 kg/m  as calculated from the following:    Height as of this encounter: 1.867 m (6' 1.5\").    Weight as of this encounter: 106.6 kg (235 lb).  Physical Exam  Patient appears comfortable and in no acute distress.        Identified Health Risks:  "

## 2022-03-29 NOTE — PATIENT INSTRUCTIONS
Patient Education   Personalized Prevention Plan  You are due for the preventive services outlined below.  Your care team is available to assist you in scheduling these services.  If you have already completed any of these items, please share that information with your care team to update in your medical record.  Health Maintenance Due   Topic Date Due     Heart Failure Action Plan  Never done     Hepatitis C Screening  Never done     Zoster (Shingles) Vaccine (1 of 2) Never done     FALL RISK ASSESSMENT  08/11/2021     COVID-19 Vaccine (2 - Booster for Nicloe series) 09/23/2021     Basic Metabolic Panel  04/27/2022        Patient Education   Personalized Prevention Plan  You are due for the preventive services outlined below.  Your care team is available to assist you in scheduling these services.  If you have already completed any of these items, please share that information with your care team to update in your medical record.  Health Maintenance Due   Topic Date Due     Heart Failure Action Plan  Never done     Hepatitis C Screening  Never done     Zoster (Shingles) Vaccine (1 of 2) Never done     FALL RISK ASSESSMENT  08/11/2021     COVID-19 Vaccine (2 - Booster for Nicole series) 09/23/2021     Basic Metabolic Panel  04/27/2022

## 2022-03-30 ASSESSMENT — ACTIVITIES OF DAILY LIVING (ADL): CURRENT_FUNCTION: NO ASSISTANCE NEEDED

## 2022-04-21 ENCOUNTER — OFFICE VISIT (OUTPATIENT)
Dept: FAMILY MEDICINE | Facility: CLINIC | Age: 77
End: 2022-04-21
Payer: COMMERCIAL

## 2022-04-21 VITALS
WEIGHT: 234.38 LBS | TEMPERATURE: 97.9 F | SYSTOLIC BLOOD PRESSURE: 135 MMHG | BODY MASS INDEX: 30.5 KG/M2 | DIASTOLIC BLOOD PRESSURE: 82 MMHG | HEART RATE: 94 BPM

## 2022-04-21 DIAGNOSIS — I48.20 CHRONIC ATRIAL FIBRILLATION (H): Primary | ICD-10-CM

## 2022-04-21 DIAGNOSIS — R60.0 BILATERAL LOWER EXTREMITY EDEMA: ICD-10-CM

## 2022-04-21 DIAGNOSIS — N40.1 BENIGN PROSTATIC HYPERPLASIA WITH INCOMPLETE BLADDER EMPTYING: ICD-10-CM

## 2022-04-21 DIAGNOSIS — R39.14 BENIGN PROSTATIC HYPERPLASIA WITH INCOMPLETE BLADDER EMPTYING: ICD-10-CM

## 2022-04-21 PROCEDURE — 99213 OFFICE O/P EST LOW 20 MIN: CPT | Performed by: FAMILY MEDICINE

## 2022-04-21 ASSESSMENT — PAIN SCALES - GENERAL: PAINLEVEL: NO PAIN (0)

## 2022-04-21 NOTE — PROGRESS NOTES
Renan Krause is a 77 year old who presents for the following health issues     HPI     Follow up after home wellness visit       Review of Systems   Feeling good overall  Mainly concerned about wife who we are also seeing    Gets blood checked inr    Has afib    Has a bad tooth    Urinating okay with the meds          Objective    /82 (BP Location: Left arm, Patient Position: Chair, Cuff Size: Adult Regular)   Pulse 94   Temp 97.9  F (36.6  C) (Temporal)   Wt 106.3 kg (234 lb 6 oz)   BMI 30.50 kg/m    Body mass index is 30.5 kg/m .  Physical Exam  Constitutional:       Appearance: He is well-developed.   HENT:      Head: Normocephalic and atraumatic.   Eyes:      Conjunctiva/sclera: Conjunctivae normal.   Neck:      Vascular: No carotid bruit.   Cardiovascular:      Rate and Rhythm: Normal rate and regular rhythm.      Heart sounds: Normal heart sounds.   Pulmonary:      Effort: Pulmonary effort is normal. No respiratory distress.      Breath sounds: Normal breath sounds.   Neurological:      Mental Status: He is alert and oriented to person, place, and time.      Cranial Nerves: No cranial nerve deficit.   Psychiatric:         Speech: Speech normal.         Behavior: Behavior normal.         mild edema bilat         ASSESSMENT / PLAN:  (I48.20) Chronic atrial fibrillation (H)  (primary encounter diagnosis)  Comment: patient stable on current meds.    Plan: no change     (N40.1,  R39.14) Benign prostatic hyperplasia with incomplete bladder emptying  Comment: doing well on combination of finasteride and tamsulosin  Plan: continue    (R60.0) Bilateral lower extremity edema  Comment: not bothersome to patient.  Hold off on diuretics for now. Patient will work on increasing walking and low sodium diet.  Plan: as above       I reviewed the patient's medications, allergies, medical history, family history, and social history.    Rodolfo Pedersen MD

## 2022-04-21 NOTE — PATIENT INSTRUCTIONS
Keep working on healthy diet/exercise     Stay on same medications    Hold the warfarin for 5 days prior to tooth extraction

## 2022-04-29 ENCOUNTER — LAB (OUTPATIENT)
Dept: LAB | Facility: CLINIC | Age: 77
End: 2022-04-29
Payer: COMMERCIAL

## 2022-04-29 ENCOUNTER — ANTICOAGULATION THERAPY VISIT (OUTPATIENT)
Dept: ANTICOAGULATION | Facility: CLINIC | Age: 77
End: 2022-04-29

## 2022-04-29 DIAGNOSIS — I48.91 ATRIAL FIBRILLATION WITH RVR (H): ICD-10-CM

## 2022-04-29 DIAGNOSIS — I48.91 ATRIAL FIBRILLATION WITH RVR (H): Primary | ICD-10-CM

## 2022-04-29 LAB — INR BLD: 2 (ref 0.9–1.1)

## 2022-04-29 PROCEDURE — 36416 COLLJ CAPILLARY BLOOD SPEC: CPT

## 2022-04-29 PROCEDURE — 85610 PROTHROMBIN TIME: CPT

## 2022-04-29 NOTE — PROGRESS NOTES
ANTICOAGULATION MANAGEMENT     Jimi Moreno 77 year old male is on warfarin with therapeutic INR result. (Goal INR 2.0-3.0)    Recent labs: (last 7 days)     04/29/22  1012   INR 2.0*       ASSESSMENT       Source(s): Chart Review and Patient/Caregiver Call       Warfarin doses taken: Less warfarin taken than planned which may be affecting INR, lost pill splitter last week. Has one now            Diet: No new diet changes identified    New illness, injury, or hospitalization: No    Medication/supplement changes: None noted    Signs or symptoms of bleeding or clotting: No    Previous INR: Therapeutic last 2(+) visits    Additional findings: None       PLAN     Recommended plan for no diet, medication or health factor changes affecting INR     Dosing Instructions: continue your current warfarin dose with next INR in 6 weeks       Summary  As of 4/29/2022    Full warfarin instructions:  7.5 mg every Mon, Wed, Fri; 5 mg all other days   Next INR check:  6/10/2022             Telephone call with Jimi who verbalizes understanding and agrees to plan and who agrees to plan and repeated back plan correctly    Lab visit scheduled    Education provided: Goal range and significance of current result, Importance of taking warfarin as instructed and Contact [phone]  with any changes, questions or concerns.     Plan made per [facility] anticoagulation protocol    Kathrine Carrasco RN  Anticoagulation Clinic  4/29/2022    _______________________________________________________________________     Anticoagulation Episode Summary     Current INR goal:  2.0-3.0   TTR:  67.3 % (1 y)   Target end date:  Indefinite   Send INR reminders to:  [facility]    Indications    Atrial fibrillation with RVR (H) [I48.91]           Comments:           Anticoagulation Care Providers     Provider Role Specialty Phone number    Francia Finnegan NP Referring Nurse Practitioner - Family [phone]    Nikki Alarcon APRN CNP  Referring Nurse Practitioner - Adult Health [phone]

## 2022-06-10 ENCOUNTER — ANTICOAGULATION THERAPY VISIT (OUTPATIENT)
Dept: ANTICOAGULATION | Facility: CLINIC | Age: 77
End: 2022-06-10

## 2022-06-10 ENCOUNTER — LAB (OUTPATIENT)
Dept: LAB | Facility: CLINIC | Age: 77
End: 2022-06-10
Payer: COMMERCIAL

## 2022-06-10 DIAGNOSIS — I48.91 ATRIAL FIBRILLATION WITH RVR (H): ICD-10-CM

## 2022-06-10 DIAGNOSIS — I48.91 ATRIAL FIBRILLATION WITH RVR (H): Primary | ICD-10-CM

## 2022-06-10 LAB — INR BLD: 3 (ref 0.9–1.1)

## 2022-06-10 PROCEDURE — 85610 PROTHROMBIN TIME: CPT

## 2022-06-10 PROCEDURE — 36416 COLLJ CAPILLARY BLOOD SPEC: CPT

## 2022-06-10 NOTE — PROGRESS NOTES
ANTICOAGULATION MANAGEMENT     Jimi Moreno 77 year old male is on warfarin with therapeutic INR result. (Goal INR 2.0-3.0)    Recent labs: (last 7 days)     06/10/22  0820   INR 3.0*       ASSESSMENT       Source(s): Chart Review and Patient/Caregiver Call       Warfarin doses taken: Warfarin taken as instructed    Diet: No new diet changes identified    New illness, injury, or hospitalization: No    Medication/supplement changes: None noted    Signs or symptoms of bleeding or clotting: No    Previous INR: Therapeutic last 2(+) visits    Additional findings: None       PLAN     Recommended plan for no diet, medication or health factor changes affecting INR     Dosing Instructions: continue your current warfarin dose with next INR in 6 weeks       Summary  As of 6/10/2022    Full warfarin instructions:  7.5 mg every Mon, Wed, Fri; 5 mg all other days   Next INR check:  7/22/2022             Telephone call with Jimi who verbalizes understanding and agrees to plan and who agrees to plan and repeated back plan correctly    Lab visit scheduled    Education provided: Importance of therapeutic range, Importance of following up at instructed interval, Monitoring for bleeding signs and symptoms, Monitoring for clotting signs and symptoms, Importance of notifying clinic for changes in medications; a sooner lab recheck maybe needed. and Contact [phone]  with any changes, questions or concerns.     Plan made per [facility] anticoagulation protocol    Kathrine Carrasco RN  Anticoagulation Clinic  6/10/2022    _______________________________________________________________________     Anticoagulation Episode Summary     Current INR goal:  2.0-3.0   TTR:  72.5 % (1 y)   Target end date:  Indefinite   Send INR reminders to:  [facility]    Indications    Atrial fibrillation with RVR (H) [I48.91]           Comments:           Anticoagulation Care Providers     Provider Role Specialty Phone number    Francia Finnegan, NP  Referring Nurse Practitioner - Family [phone]    Nikki Alarcon APRN CNP Referring Nurse Practitioner - Adult Health [phone]

## 2022-06-11 DIAGNOSIS — I48.91 ATRIAL FIBRILLATION WITH RVR (H): Primary | ICD-10-CM

## 2022-06-13 RX ORDER — WARFARIN SODIUM 5 MG/1
TABLET ORAL
Qty: 120 TABLET | Refills: 1 | Status: SHIPPED | OUTPATIENT
Start: 2022-06-13 | End: 2023-02-20

## 2022-06-13 NOTE — TELEPHONE ENCOUNTER
ANTICOAGULATION MANAGEMENT:  Medication Refill    Anticoagulation Summary  As of 6/10/2022    Warfarin maintenance plan:  7.5 mg (5 mg x 1.5) every Mon, Wed, Fri; 5 mg (5 mg x 1) all other days   Next INR check:  7/22/2022   Target end date:  Indefinite    Indications    Atrial fibrillation with RVR (H) [I48.91]             Anticoagulation Care Providers     Provider Role Specialty Phone number    Francia Finnegan NP Referring Nurse Practitioner - Family [phone]    Nikki Alarcon APRN CNP Referring Nurse Practitioner - Adult [facility] [phone]          Visit with referring provider/group within last year: Yes    [facility] referral signed within last year: Yes    Jimi meets all criteria for refill (current [facility] referral, office visit with referring provider/group in last year, lab monitoring up to date or not exceeding 2 weeks overdue). Rx instructions and quantity supplied updated to match patient's current dosing plan. Warfarin 90 day supply with 1 refill granted per [facility] protocol     Kathrine Carrasco RN  Anticoagulation Clinic

## 2022-07-20 DIAGNOSIS — I48.91 ATRIAL FIBRILLATION WITH RVR (H): Primary | ICD-10-CM

## 2022-07-22 ENCOUNTER — ANTICOAGULATION THERAPY VISIT (OUTPATIENT)
Dept: ANTICOAGULATION | Facility: CLINIC | Age: 77
End: 2022-07-22

## 2022-07-22 ENCOUNTER — LAB (OUTPATIENT)
Dept: LAB | Facility: CLINIC | Age: 77
End: 2022-07-22
Payer: COMMERCIAL

## 2022-07-22 DIAGNOSIS — I48.91 ATRIAL FIBRILLATION WITH RVR (H): ICD-10-CM

## 2022-07-22 DIAGNOSIS — I48.91 ATRIAL FIBRILLATION WITH RVR (H): Primary | ICD-10-CM

## 2022-07-22 LAB — INR BLD: 2.8 (ref 0.9–1.1)

## 2022-07-22 PROCEDURE — 36415 COLL VENOUS BLD VENIPUNCTURE: CPT

## 2022-07-22 PROCEDURE — 85610 PROTHROMBIN TIME: CPT

## 2022-07-22 NOTE — PROGRESS NOTES
ANTICOAGULATION MANAGEMENT     Jimi Moreno 77 year old male is on warfarin with therapeutic INR result. (Goal INR 2.0-3.0)    Recent labs: (last 7 days)     07/22/22  0805   INR 2.8*       ASSESSMENT       Source(s): Chart Review and Patient/Caregiver Call       Warfarin doses taken: Warfarin taken as instructed    Diet: No new diet changes identified    New illness, injury, or hospitalization: No    Medication/supplement changes: None noted    Signs or symptoms of bleeding or clotting: No    Previous INR: Therapeutic last 2(+) visits    Additional findings: None       PLAN     Recommended plan for no diet, medication or health factor changes affecting INR     Dosing Instructions: continue your current warfarin dose with next INR in 6 weeks       Summary  As of 7/22/2022    Full warfarin instructions:  7.5 mg every Mon, Wed, Fri; 5 mg all other days   Next INR check:  9/2/2022             Telephone call with Jimi who verbalizes understanding and agrees to plan    Lab visit scheduled    Education provided: None required    Plan made per [facility] anticoagulation protocol    Josselin Youssef RN  Anticoagulation Clinic  7/22/2022    _______________________________________________________________________     Anticoagulation Episode Summary     Current INR goal:  2.0-3.0   TTR:  74.1 % (1 y)   Target end date:  Indefinite   Send INR reminders to:  [facility]    Indications    Atrial fibrillation with RVR (H) [I48.91]           Comments:           Anticoagulation Care Providers     Provider Role Specialty Phone number    Francia Finnegan NP Referring Nurse Practitioner - Family [phone]    Nikki Alarcon APRN CNP Referring Nurse Practitioner - Adult Health [phone]

## 2022-09-02 ENCOUNTER — LAB (OUTPATIENT)
Dept: LAB | Facility: CLINIC | Age: 77
End: 2022-09-02
Payer: COMMERCIAL

## 2022-09-02 ENCOUNTER — ANTICOAGULATION THERAPY VISIT (OUTPATIENT)
Dept: ANTICOAGULATION | Facility: CLINIC | Age: 77
End: 2022-09-02

## 2022-09-02 DIAGNOSIS — I48.91 ATRIAL FIBRILLATION WITH RVR (H): ICD-10-CM

## 2022-09-02 DIAGNOSIS — I48.91 ATRIAL FIBRILLATION WITH RVR (H): Primary | ICD-10-CM

## 2022-09-02 LAB — INR BLD: 2.8 (ref 0.9–1.1)

## 2022-09-02 PROCEDURE — 85610 PROTHROMBIN TIME: CPT

## 2022-09-02 PROCEDURE — 36416 COLLJ CAPILLARY BLOOD SPEC: CPT

## 2022-09-02 NOTE — PROGRESS NOTES
ANTICOAGULATION MANAGEMENT     Jimi Moreno 77 year old male is on warfarin with therapeutic INR result. (Goal INR 2.0-3.0)    Recent labs: (last 7 days)     09/02/22  0807   INR 2.8*       ASSESSMENT       Source(s): Chart Review and Patient/Caregiver Call       Warfarin doses taken: Warfarin taken as instructed    Diet: No new diet changes identified    New illness, injury, or hospitalization: No    Medication/supplement changes: None noted    Signs or symptoms of bleeding or clotting: No    Previous INR: Therapeutic last 2(+) visits    Additional findings: None       PLAN     Recommended plan for no diet, medication or health factor changes affecting INR     Dosing Instructions: Continue your current warfarin dose with next INR in 6-7 weeks       Summary  As of 9/2/2022    Full warfarin instructions:  7.5 mg every Mon, Wed, Fri; 5 mg all other days   Next INR check:  10/21/2022             Telephone call with  Jimi ([phone]) who verbalizes understanding and agrees to plan    Lab visit scheduled - INR on 10/21/22     Education provided: Importance of consistent vitamin K intake and Goal range and significance of current result    Plan made per [facility] anticoagulation protocol    Vanessa Siddiqi, RN  Anticoagulation Clinic  9/2/2022    _______________________________________________________________________     Anticoagulation Episode Summary     Current INR goal:  2.0-3.0   TTR:  85.6 % (1 y)   Target end date:  Indefinite   Send INR reminders to:  [facility]    Indications    Atrial fibrillation with RVR (H) [I48.91]           Comments:           Anticoagulation Care Providers     Provider Role Specialty Phone number    Francia Finnegan NP Referring Nurse Practitioner - Family [phone]    Nikki Alarcon APRN CNP Referring Nurse Practitioner - Adult Health [phone]

## 2022-09-13 ENCOUNTER — DOCUMENTATION ONLY (OUTPATIENT)
Dept: ANTICOAGULATION | Facility: CLINIC | Age: 77
End: 2022-09-13

## 2022-09-13 DIAGNOSIS — I48.91 ATRIAL FIBRILLATION WITH RVR (H): Primary | ICD-10-CM

## 2022-09-13 NOTE — PROGRESS NOTES
ANTICOAGULATION CLINIC REFERRAL RENEWAL REQUEST       An annual renewal order is required for all patients referred to [facility].?  Please review and sign the pended referral order for Jimi Moreno.       ANTICOAGULATION SUMMARY      Warfarin indication(s)   Atrial Fibrillation    Mechanical heart valve present?  NO       Current goal range   INR: 2.0-3.0     Goal appropriate for indication? Goal INR 2-3, standard for indication(s) above     Time in Therapeutic Range (TTR)  (Goal > 60%) 85%       Office visit with referring provider's group within last year yes on 4/21/22       Loulou Reardon RN  [facility]     General

## 2022-10-21 ENCOUNTER — ANTICOAGULATION THERAPY VISIT (OUTPATIENT)
Dept: ANTICOAGULATION | Facility: CLINIC | Age: 77
End: 2022-10-21

## 2022-10-21 ENCOUNTER — LAB (OUTPATIENT)
Dept: LAB | Facility: CLINIC | Age: 77
End: 2022-10-21
Payer: COMMERCIAL

## 2022-10-21 DIAGNOSIS — I10 ESSENTIAL HYPERTENSION WITH GOAL BLOOD PRESSURE LESS THAN 140/90: ICD-10-CM

## 2022-10-21 DIAGNOSIS — I48.91 ATRIAL FIBRILLATION WITH RVR (H): Primary | ICD-10-CM

## 2022-10-21 DIAGNOSIS — Z13.220 SCREENING FOR HYPERLIPIDEMIA: ICD-10-CM

## 2022-10-21 DIAGNOSIS — I48.91 ATRIAL FIBRILLATION WITH RVR (H): ICD-10-CM

## 2022-10-21 LAB — INR BLD: 2.7 (ref 0.9–1.1)

## 2022-10-21 PROCEDURE — 85610 PROTHROMBIN TIME: CPT

## 2022-10-21 PROCEDURE — 36416 COLLJ CAPILLARY BLOOD SPEC: CPT

## 2022-10-21 NOTE — PROGRESS NOTES
ANTICOAGULATION MANAGEMENT     Jimi Moreno 77 year old male is on warfarin with therapeutic INR result. (Goal INR 2.0-3.0)    Recent labs: (last 7 days)     10/21/22  0814   INR 2.7*       ASSESSMENT       Source(s): Chart Review and Patient/Caregiver Call       Warfarin doses taken: Warfarin taken as instructed    Diet: No new diet changes identified    New illness, injury, or hospitalization: No    Medication/supplement changes: None noted    Signs or symptoms of bleeding or clotting: No    Previous INR: Therapeutic last 2(+) visits    Additional findings: None       PLAN     Recommended plan for no diet, medication or health factor changes affecting INR     Dosing Instructions: Continue your current warfarin dose with next INR in 6 weeks       Summary  As of 10/21/2022    Full warfarin instructions:  7.5 mg every Mon, Wed, Fri; 5 mg all other days; Starting 10/21/2022   Next INR check:  12/2/2022             Telephone call with Jimi who verbalizes understanding and agrees to plan    Lab visit scheduled    Education provided:     Goal range and lab monitoring: goal range and significance of current result    Plan made per [facility] anticoagulation protocol    Aby Jung RN  Anticoagulation Clinic  10/21/2022    _______________________________________________________________________     Anticoagulation Episode Summary     Current INR goal:  2.0-3.0   TTR:  94.1 % (1 y)   Target end date:  Indefinite   Send INR reminders to:  [facility]    Indications    Atrial fibrillation with RVR (H) [I48.91]           Comments:           Anticoagulation Care Providers     Provider Role Specialty Phone number    Francia Finnegan NP Referring Nurse Practitioner - Family [phone]    Nikki Alarcon APRN CNP Referring Nurse Practitioner - [facility] [phone]    Hannah Knight MD Referring Family Medicine [phone]

## 2022-12-02 ENCOUNTER — LAB (OUTPATIENT)
Dept: LAB | Facility: CLINIC | Age: 77
End: 2022-12-02
Payer: COMMERCIAL

## 2022-12-02 ENCOUNTER — ANTICOAGULATION THERAPY VISIT (OUTPATIENT)
Dept: ANTICOAGULATION | Facility: CLINIC | Age: 77
End: 2022-12-02

## 2022-12-02 DIAGNOSIS — I48.91 ATRIAL FIBRILLATION WITH RVR (H): Primary | ICD-10-CM

## 2022-12-02 DIAGNOSIS — I48.91 ATRIAL FIBRILLATION WITH RVR (H): ICD-10-CM

## 2022-12-02 LAB — INR BLD: 2.9 (ref 0.9–1.1)

## 2022-12-02 PROCEDURE — 36416 COLLJ CAPILLARY BLOOD SPEC: CPT

## 2022-12-02 PROCEDURE — 85610 PROTHROMBIN TIME: CPT

## 2022-12-02 NOTE — PROGRESS NOTES
ANTICOAGULATION MANAGEMENT     Jimi Moreno 77 year old male is on warfarin with therapeutic INR result. (Goal INR 2.0-3.0)    Recent labs: (last 7 days)     12/02/22  0818   INR 2.9*       ASSESSMENT       Source(s): Chart Review and Patient/Caregiver Call       Warfarin doses taken: Warfarin taken as instructed    Diet: No new diet changes identified    New illness, injury, or hospitalization: No    Medication/supplement changes: None noted    Signs or symptoms of bleeding or clotting: No    Previous INR: Therapeutic last 2(+) visits    Additional findings: None       PLAN     Recommended plan for no diet, medication or health factor changes affecting INR     Dosing Instructions: Continue your current warfarin dose with next INR in 6 weeks       Summary  As of 12/2/2022    Full warfarin instructions:  7.5 mg every Mon, Wed, Fri; 5 mg all other days; Starting 12/2/2022   Next INR check:  1/13/2023             Telephone call with Jimi who verbalizes understanding and agrees to plan    Lab visit scheduled    Education provided:     Goal range and lab monitoring: goal range and significance of current result    Plan made per [facility] anticoagulation protocol    Aby Jung RN  Anticoagulation Clinic  12/2/2022    _______________________________________________________________________     Anticoagulation Episode Summary     Current INR goal:  2.0-3.0   TTR:  98.7 % (1 y)   Target end date:  Indefinite   Send INR reminders to:  [facility]    Indications    Atrial fibrillation with RVR (H) [I48.91]           Comments:           Anticoagulation Care Providers     Provider Role Specialty Phone number    Francia Finnegan NP Referring Nurse Practitioner - Family [phone]    Nikki Alarcon APRN CNP Referring Nurse Practitioner - [facility] [phone]    Hannah Knight MD Referring Family Medicine [phone]

## 2022-12-19 ENCOUNTER — NURSE TRIAGE (OUTPATIENT)
Dept: NURSING | Facility: CLINIC | Age: 77
End: 2022-12-19

## 2022-12-19 ENCOUNTER — OFFICE VISIT (OUTPATIENT)
Dept: FAMILY MEDICINE | Facility: CLINIC | Age: 77
End: 2022-12-19
Payer: COMMERCIAL

## 2022-12-19 VITALS
DIASTOLIC BLOOD PRESSURE: 89 MMHG | HEART RATE: 95 BPM | SYSTOLIC BLOOD PRESSURE: 168 MMHG | OXYGEN SATURATION: 96 % | TEMPERATURE: 97.8 F

## 2022-12-19 DIAGNOSIS — K40.90 INGUINAL HERNIA WITHOUT OBSTRUCTION OR GANGRENE, RECURRENCE NOT SPECIFIED, UNSPECIFIED LATERALITY: Primary | ICD-10-CM

## 2022-12-19 DIAGNOSIS — K40.90 LEFT INGUINAL HERNIA: Primary | ICD-10-CM

## 2022-12-19 PROCEDURE — 99213 OFFICE O/P EST LOW 20 MIN: CPT | Performed by: FAMILY MEDICINE

## 2022-12-19 NOTE — TELEPHONE ENCOUNTER
Nurse Triage SBAR    Is this a 2nd Level Triage? YES, LICENSED PRACTITIONER REVIEW IS REQUIRED    Situation: hernia suspected    Background: Patient has concerns with his left lower abdomin. It is a hard bulge that first appeared 2 weeks ago that lasted for a minute and then went away. Last night when he got into bed it started to bulge and was getting bigger and he could hear some gurgling and felt like he needed to pass gas. At the time of call there is no pain but it feels hard. From left to right 2 in and up and down 3-4 inches and oval shaped. It is not tender to the touch. Has not been diagnosed with a hernia but is suspected that this is a hernia.   Denies fever, pain, vomiting, pulsating.       Assessment: ADS, UC, into office    Protocol Recommended Disposition:   See in Office Today    Recommendation: disposition please.      Routed to provider    Does the patient meet one of the following criteria for ADS visit consideration? 16+ years old, with an [facility] PCP     TIP  Providers, please consider if this condition is appropriate for management at one of our Acute and Diagnostic Services sites.     If patient is a good candidate, please use dotphrase <dot>triageresponse and select Refer to ADS to document.      Patient has concerns with his left lower abdomin. It is a hard bulge that first appeared 2 weeks ago that lasted for a minute and then went away. Last night when he got into bed it started to bulge and was getting bigger and he could hear some gurgling and felt like he needed to pass gas. At the time of call there is no pain but it feels hard. From left to right 2 in and up and down 3-4 inches and oval shaped. It is not tender to the touch. Has not been diagnosed with a hernia but is suspected that this is a hernia.   Denies fever, pain, vomiting, pulsating.   Protocol recommends see in office today.   Care advice given. Informed that PCP may recommend UC. UC locations and hours given. Patient to call  back with any worsening symptoms.   Call back to patient at [phone]    Rama Lockhart RN   12/19/22 10:40 AM  [facility] Nurse Advisor    Reason for Disposition    Can't reduce the hernia (NO pain, local tenderness, or vomiting)    Additional Information    Negative: Swelling of scrotum and has not previously been diagnosed with a hernia    Negative: SEVERE abdominal pain    Negative: Hernia is painful or tender to touch    Negative: Vomiting and can't reduce the hernia    Negative: Vomiting and abdomen looks much more swollen than usual    Negative: Vomiting and hernia is more painful or swollen than usual    Negative: Swollen lump in groin and pulsating (like heartbeat)    Negative: Patient sounds very sick or weak to the triager    Negative: Constant abdominal pain and present > 2 hours (NO pain or tenderness of hernia)    Protocols used: HERNIA-A-OH

## 2022-12-19 NOTE — TELEPHONE ENCOUNTER
Sounds classic for groin/inguinal hernia, so okay to just refer patient to general surgeon for their opinion    I did referral    Please inform patient and schedule consult    If symptoms get really bad, to emergency room    Rodolfo Pedersen MD

## 2022-12-19 NOTE — TELEPHONE ENCOUNTER
Spoke with patient. Relayed provider's message as written. Patient verbalized understanding and has no further questions at this time.    Patient currently at [place] urgent care. Patient would like to be further examined and will determine whether he would like to go forward with surgery referral. Informed patient of information to schedule appointment.     Janel Gomes RN  [facility]

## 2022-12-19 NOTE — TELEPHONE ENCOUNTER
Called patient, voice mail box has not been set up, so unable to LVM. Will have to retry.    Lashaun Quesada RN   [facility]

## 2022-12-19 NOTE — TELEPHONE ENCOUNTER
REFERRAL INFORMATION:    Referring Provider:  Dr. Rodolfo Pedersen     Referring Clinic:      Reason for Visit/Diagnosis: Left inguinal hernia        FUTURE VISIT INFORMATION:    Appointment Date: 12/22/20222    Appointment Time: 12 PM      NOTES RECORD STATUS  DETAILS   OFFICE NOTE from Referring Provider N/A    OFFICE NOTE from Other Specialists Internal 12/19/2022 Office visit with Dr. Mio Gutierrez ([facility])     [facility] DISCHARGE SUMMARY/ ED VISITS  N/A    OPERATIVE REPORT N/A    ENDOSCOPY (EGD)  N/A    PERTINENT LABS Internal    PATHOLOGY REPORTS (RELATED) N/A    IMAGING (CT, MRI, US, XR)  Internal CT Abdomen: 7/8/2020

## 2022-12-19 NOTE — PATIENT INSTRUCTIONS
Push the hernia gently back in.    If you can't get it in and it is really painful, go to the ER.    You can put some ice on it.  
hypotension

## 2022-12-22 ENCOUNTER — OFFICE VISIT (OUTPATIENT)
Dept: SURGERY | Facility: CLINIC | Age: 77
End: 2022-12-22
Attending: FAMILY MEDICINE
Payer: COMMERCIAL

## 2022-12-22 ENCOUNTER — PRE VISIT (OUTPATIENT)
Dept: SURGERY | Facility: CLINIC | Age: 77
End: 2022-12-22

## 2022-12-22 VITALS
OXYGEN SATURATION: 98 % | WEIGHT: 225.7 LBS | HEART RATE: 91 BPM | HEIGHT: 74 IN | DIASTOLIC BLOOD PRESSURE: 105 MMHG | SYSTOLIC BLOOD PRESSURE: 157 MMHG | BODY MASS INDEX: 28.97 KG/M2

## 2022-12-22 DIAGNOSIS — K40.90 LEFT INGUINAL HERNIA: Primary | ICD-10-CM

## 2022-12-22 PROCEDURE — 99203 OFFICE O/P NEW LOW 30 MIN: CPT | Performed by: SURGERY

## 2022-12-22 RX ORDER — CEFAZOLIN SODIUM 2 G/50ML
2 SOLUTION INTRAVENOUS SEE ADMIN INSTRUCTIONS
Status: CANCELLED | OUTPATIENT
Start: 2022-12-22

## 2022-12-22 RX ORDER — CEFAZOLIN SODIUM 2 G/50ML
2 SOLUTION INTRAVENOUS
Status: CANCELLED | OUTPATIENT
Start: 2022-12-22

## 2022-12-22 ASSESSMENT — PAIN SCALES - GENERAL: PAINLEVEL: NO PAIN (0)

## 2022-12-22 NOTE — PATIENT INSTRUCTIONS
You met with Dr. Ayo Mittal.      Today's visit instructions:    Reminder:  Surgery Requirements  Your surgery will be at [facility] [place]- 5th Floor  You will need to arrive 1.5 - 2 hours early based on the location of your surgery ([facility] 1.5 hours, [facility]  2 hours).  You will need someone to drive you home (over 18 years old) and stay with you for 24 hours after the procedure.  You will need a preop physical with your regular doctor (or PAC if requested by your surgeon) within 30 days of surgery- closer is always better.  Stop any blood thinners, vitamins, minerals, or herbal supplements 5 days before surgery.  If you are taking a prescribed blood thinner please let us know for specific instructions.  Fasting- a nurse from [facility] will call you 1-2 days before surgery to confirm your procedure and tell you when to stop eating and drinking.   Wash with the soap (Antibacterial, Dial Complete Foam, Hibiclense, or soap given/mailed from the clinic) the night before surgery and morning of surgery. See instructions in the Surgery Packet.  If you would like a procedure estimate please call Cost of Care at [phone].    If you have questions please contact Carole RN or Lorraine RN during regular clinic hours, Monday through Friday 7:30 AM - 4:00 PM, or you can contact us via [employer] at anytime.       If you have urgent needs after-hours, weekends, or holidays please call the hospital at [phone] and ask to speak with our on-call General Surgery Team.    Appointment [phone]  Nurse Advice (Carole or Lorraine): [phone]   Surgery Scheduler (Tangela): [phone]  Fax: [phone]    After Your Open Inguinal Hernia Repair         Incision care   You may take a shower the day after surgery. Carefully wash your incision with soap and water. Do not submerge yourself in water (bath, whirlpool, hot tub, pool, lake) for 14 days after surgery.   Remove the bandage  the day after surgery, but leave the medical tape (Steri-Strips) or glue in place. These will loosen and fall off on their own 1-2 weeks after surgery.    Always wash your hands before touching your incisions or removing bandages.   It is not unusual to form a collection of fluid or blood under your incision that may feel firm or squishy- it can take several weeks to months for your body to reabsorb it.  At times, it may even drain.  If that should happen keep the area clean with soap, water,  and cover with a clean gauze dressing. You can change this daily or as needed.     Other medicines   Wait to start aspirin or blood thinners until the day after surgery. You can take any other regular medicines at your normal time the day after surgery.   Your pain medicine may cause constipation (hard, dry stools). To help with this, take the stool softener your doctor gave you or an over-the-counter stool softener or laxative. You can stop taking this when you are no longer taking pain medicine and your bowel movements are back to normal.      For pain or discomfort   Take the narcotic pain medicine your doctor gave you as needed and as instructed on the bottle. If you prefer to use over-the-counter medication, use acetaminophen (Tylenol) or ibuprofen (Advil, Motrin) as instructed on the box. Do not take Tylenol if it is in your narcotic pain medication.    Use an ice pack on your groin for 20 minutes at a time as needed for the first 24 hours. Be sure to protect your skin by putting a cloth between the ice pack and your skin.   After 24 hours you can switch to heat for 20 minutes as needed. Be sure to protect your skin by putting a cloth between the heat pack and your skin.    It is normal to feel a lump in your groin after surgery. This lump may take up to 8 weeks to go away.      Activities   No driving until you feel it s safe to do so. Don t drive while taking narcotic pain medicine.   Don t lift anything heavier than  20 pounds for 3 weeks after surgery.      Special equipment   Male Patients:  We recommend that you wear scrotal support for the first 3 days after surgery; however, you can wear it longer if you wish.  We suggest using an athletic supporter (Jock Strap), snug briefs, or Spandex biker shorts.     Diet   You can eat your regular meals after surgery.      When to call the doctor   Call your doctor if you have:   A fever above 101 F (38.3 C) (taken under the tongue), or a fever or chills lasting more than a day.   Redness at the incision site.   Any fluid or blood draining from the incision, especially if it smells bad.    Severe pain that doesn t improve with pain medicine.      We will call you 2 to 4 days after surgery to review this handout, answer questions and help arrange after-surgery care. If you have questions or concerns, please call [phone] during regular office hours. If you need to call after business hours, call [phone] and ask to page the surgeon on-call.

## 2022-12-22 NOTE — NURSING NOTE
"Chief Complaint   Patient presents with     New Patient     Left inguinal hernia       Vitals:    12/22/22 1155   BP: (!) 157/105   BP Location: Left arm   Patient Position: Sitting   Cuff Size: Adult Large   Pulse: 91   SpO2: 98%   Weight: 102.4 kg (225 lb 11.2 oz)   Height: 1.867 m (6' 1.5\")       Body mass index is 29.37 kg/m .                          Braulio Davis, EMT    "

## 2022-12-22 NOTE — NURSING NOTE
Pre and Post op Patient Education/Teaching Flowsheet  Relevant Diagnosis:  Inguinal Hernia (K40.90)  Teaching Topic:  Pre and post op teaching  Person(s) Involved in teaching:  Patient     Motivation Level:  Asks Questions:  Yes  Eager to Learn:  Yes  Cooperative:  Yes  Receptive (willing/able to accept information):  Yes  Any cultural factors/Ep[employer] beliefs that may influence understanding or compliance?  No    Patient/caregiver/family demonstrates understanding of the following:  Reason for the appointment, diagnosis, and treatment plan:  Yes  Patient demonstrates understanding of the following:  Pre-op bowel prep:  No  Post-op pain management recommendations (medications, ice compress, binder/athletic supporter (if applicable), etc.:  Yes  Inguinal hernia patients:  Post-op urinary retention- discussed signs/symptoms and visit to ER for Ash catheter placement and to stay in place for at least 48 hours:  NA  Restrictions:  Yes  Medications to take the day of surgery:  Per PCP  Blood thinner medications discussed and when to stop (if applicable):  Yes  Wound care:  Yes  Diabetes medication management (if applicable):  Per PCP  Which situations necessitate calling provider and whom to contact:  Discussed how to contact the hospital, nurse, and clinic scheduling staff if necessary      Date and time of surgery:  TBD  Location of surgery: [facility] [place]- 5th Floor  History and Physical and any other testing necessary prior to surgery:  Yes  Required time line for completion of History and Physical and any pre-op testing:  Yes  Discuss need for someone to drive patient home and stay with them for 24 hours:  Yes  Pre-op showering/scrub information with Surgical Scrub:  Yes  NPO Guidelines:  NPO per Anesthesia Guidelines    Infection Prevention: Patient demonstrates understanding of the following:  Patient instructed on hand hygiene:  Yes  Surgical procedure site care will be taught  and will be reviewed at the time of discharge  Signs and symptoms of infection taught:  Yes  Wound care reviewed and will be taught at the time of discharge  Central venous catheter care will be taught at the time of discharge (if applicable)    Post-op follow-up:  Instructional materials used/given/mailed:  Surgical logistics, post op teaching sheet, and surgical scrub

## 2022-12-22 NOTE — PROGRESS NOTES
Jimi Moreno is a 77 year old male with a 1 year history of a left inguinal mass with the following symptoms of lump and pain.    Onset did not occur with lifting.  Obstructive symptoms:  no  Urinary difficulties:  occasionally  Chronic cough: no  Constipation:  no  Current level of activity:  Medium, cares for wife.    Past medical and surgical history, medications, allergies, family history, and social history were reviewed with the patient. Afib on coumadin    ROS: 10 point review of systems negative except noted in HPI  PHYSICAL EXAM  General appearance- healthy, alert, and in no distress.  Skin- Skin color and turgor normal.  No obvious rashes.  Neck- Neck is supple without obvious adenopathy.  Lungs- Respiratory effort unlabored.  Gait- Normal.  Abdomen - soft non distended, non tender without obvious masses.  [facility]  Impression:  Inguinal hernia, left  Recommendation:  Left Inguinal Hernioplasty open mesh repair    A full discussion regarding the alternatives, risks, goals, and potential complications for this surgery was completed today.  The patient understood I would use non recalled mesh and  that the potential problems included but are not limited to:  Infection, bleeding, hematoma, seroma, recurrence, injury to nerve/muscle or involved testicle(if male), ischemic orchitis(if male), and chronic pain.    The patient verbally expressed understanding, was given the opportunity for questions, and gives full informed consent for the procedure.      Today the patient was instructed on the need for a preoperative H&P, NPO status prior to surgery, and the need to stop anticoagulants prior to surgery.  Additional educational material, soap, and instructions will be mailed out to the patients home.    The total time spent with this patient was 30 minutes.  The total time was spent on the date of encounter doing chart review, history and physical, dressing changes, documentation, patient education, and any further  activity as noted above.    PAC for eval.

## 2022-12-22 NOTE — LETTER
12/22/2022       RE: Jimi Moreno  [address] [facility] [address]     Dear Colleague,    Thank you for referring your patient, Jimi Moreno, to the [facility] [place] at [facility]. Please see a copy of my visit note below.    Jimi Moreno is a 77 year old male with a 1 year history of a left inguinal mass with the following symptoms of lump and pain.    Onset did not occur with lifting.  Obstructive symptoms:  no  Urinary difficulties:  occasionally  Chronic cough: no  Constipation:  no  Current level of activity:  Medium, cares for wife.    Past medical and surgical history, medications, allergies, family history, and social history were reviewed with the patient. Afib on coumadin    ROS: 10 point review of systems negative except noted in HPI  PHYSICAL EXAM  General appearance- healthy, alert, and in no distress.  Skin- Skin color and turgor normal.  No obvious rashes.  Neck- Neck is supple without obvious adenopathy.  Lungs- Respiratory effort unlabored.  Gait- Normal.  Abdomen - soft non distended, non tender without obvious masses.  [facility]  Impression:  Inguinal hernia, left  Recommendation:  Left Inguinal Hernioplasty open mesh repair    A full discussion regarding the alternatives, risks, goals, and potential complications for this surgery was completed today.  The patient understood I would use non recalled mesh and  that the potential problems included but are not limited to:  Infection, bleeding, hematoma, seroma, recurrence, injury to nerve/muscle or involved testicle(if male), ischemic orchitis(if male), and chronic pain.    The patient verbally expressed understanding, was given the opportunity for questions, and gives full informed consent for the procedure.      Today the patient was instructed on the need for a preoperative H&P, NPO status prior to surgery, and the need to stop anticoagulants prior to  surgery.  Additional educational material, soap, and instructions will be mailed out to the patients home.    The total time spent with this patient was 30 minutes.  The total time was spent on the date of encounter doing chart review, history and physical, dressing changes, documentation, patient education, and any further activity as noted above.    PAC for eval.        Sincerely,    Ayo Mittal MD

## 2022-12-23 ENCOUNTER — TELEPHONE (OUTPATIENT)
Dept: SURGERY | Facility: CLINIC | Age: 77
End: 2022-12-23

## 2022-12-23 PROBLEM — K40.90 LEFT INGUINAL HERNIA: Status: ACTIVE | Noted: 2022-12-23

## 2022-12-23 NOTE — TELEPHONE ENCOUNTER
Patient is scheduled for surgery with Dr. Mittal    Spoke with: Jimi    Date of Surgery: 1/16/2023    Location: ASC    Informed patient they will need an adult  Yes    Pre op with Provider n/a    H&P: Scheduled with PAC on 1/4/2023 at 9:15am    Pre-procedure COVID-19 Test: n/a - Patient scheduled after 12/6    Additional imaging/appointments: n/a    Surgery packet: To be sent in the mail     Additional comments: n/a

## 2022-12-23 NOTE — TELEPHONE ENCOUNTER
FUTURE VISIT INFORMATION      SURGERY INFORMATION:    Date: 23    Location: uc or    Surgeon:  Ayo Mittal MD    Anesthesia Type:  MAC with Local    Procedure: HERNIORRHAPHY, LEFT INGUINAL, OPEN    Consult: ov     RECORDS REQUESTED FROM:       [facility] Care [facility]    Pertinent Medical History: hypertension    Most recent EKG+ Tracin21    Most recent ECHO: 20

## 2023-01-03 ENCOUNTER — TELEPHONE (OUTPATIENT)
Facility: CLINIC | Age: 78
End: 2023-01-03

## 2023-01-03 DIAGNOSIS — I48.91 ATRIAL FIBRILLATION WITH RVR (H): Primary | ICD-10-CM

## 2023-01-03 RX ORDER — BENZOCAINE/MENTHOL 6 MG-10 MG
LOZENGE MUCOUS MEMBRANE 2 TIMES DAILY PRN
COMMUNITY

## 2023-01-03 NOTE — PROGRESS NOTES
Preoperative Assessment Center Medication History Note    Medication history completed on January 3, 2023 by this writer. See Epic admission navigator for prior to admission medications. Operating room staff will still need to confirm medications and last dose information on day of surgery.     Medication history interview sources  Patient interview: Yes  Care Everywhere records: No  Surescripts pharmacy refill records: Yes  Other (if applicable):     Changes made to PTA medication list  Added: hydrocortisone cream.   Deleted: none  Changed: adjusted time of day for all meds.     Additional medication history information (including reliability of information, actions taken by pharmacist):    -- No recent (within 30 days) course of antibiotics  -- No recent (within 30 days) course of systemic steroids  -- Reports being on blood thinning medications - warfarin - see other note.    -- Declines being on any other prescription or over-the-counter medications    Prior to Admission medications    Medication Sig Last Dose Taking? Auth Provider Long Term End Date   diltiazem ER COATED BEADS (CARDIZEM CD) 360 MG 24 hr capsule Take 1 capsule by mouth once daily  Patient taking differently: Take 360 mg by mouth every evening Taking Yes Rodolfo Pedersen MD Yes    finasteride (PROSCAR) 5 MG tablet Take 1 tablet by mouth once daily  Patient taking differently: Take 5 mg by mouth every evening Taking Yes Rodolfo Pedersen MD     hydrocortisone (CORTAID) 1 % external cream Apply topically 2 times daily as needed for rash Taking Yes Unknown, Entered By History     metoprolol succinate ER (TOPROL XL) 50 MG 24 hr tablet Take 1 tablet by mouth once daily  Patient taking differently: Take 50 mg by mouth every evening Taking Yes Rodolfo Pedersen MD Yes    tamsulosin (FLOMAX) 0.4 MG capsule Take 2 capsules by mouth once daily  Patient taking differently: Take 0.8 mg by mouth every evening Taking Yes Rodolfo Pedersen MD     warfarin  ANTICOAGULANT (COUMADIN) 5 MG tablet TAKE 1 & 1/2 TABLETS ON MONDAY, WEDNESDAY, Friday, AND 1 TABLET ALL OTHER DAYS OF  THE WEEK as directed by INR clinic/ACC nurse  Patient taking differently: TAKE 1 & 1/2 TABLETS ON MONDAY, WEDNESDAY, Friday, AND 1 TABLET ALL OTHER DAYS OF  THE WEEK as directed by INR clinic/ACC nurse. Takes in the evening. Taking Yes Rodolfo Pedersen MD          Medication history completed by: Jose Cruz Baird [facility]

## 2023-01-03 NOTE — PHARMACY - PREOPERATIVE ASSESSMENT CENTER
Anticoagulation Note - Preoperative Assessment Center (PAC) Pharmacist     Patient was interviewed on January 3, 2023 as a part of PAC clinic appointment. The purpose of this note is to document the perioperative anticoagulation plan outlined by the providers caring for Jimi Moreno.     Current Regimen  Anticoagulation Regimen as of January 3, 2023: warfarin - take 7.5 mg every Mon, Wed, Fri; 5 mg all other days. Takes in the evening.   Indication: afib with RVR  Prescriber:  Dr. Knight  Expected Duration of therapy: indefinite   INR Goal: 2-3    Creatinine   Date Value Ref Range Status   10/27/2021 1.26 (H) 0.66 - 1.25 mg/dL Final   08/11/2020 1.20 0.66 - 1.25 mg/dL Final       Perioperative plan  Jimi Moreno is scheduled for HERNIORRHAPHY, LEFT INGUINAL, OPEN on 1/16/23 with Dr. Mittal and the perioperative anticoagulation plan will be outlined by his [facility] clinic. Patient has INR Friday and this writer sent out telephone encounter message to Anticoagulation pharmacist to start working on a plan.  Please see their future notes for full details.        Resumption of anticoagulation after procedure will be based on surgery team assessment of bleeding risks and complications.  This plan may require re-assessment and modification by his primary team in the perioperative setting depending on patients clinical situation.        Jose Cruz Baird [facility]  January 3, 2023  9:43 AM

## 2023-01-03 NOTE — TELEPHONE ENCOUNTER
"LICHA-PROCEDURAL ANTICOAGULATION  MANAGEMENT    ASSESSMENT     Warfarin interruption plan for herniorrhaphy on 2023.    Indication for Anticoagulation: Atrial Fibrillation      [id number] = 3 (Hypertension and Age >= 75)      Licha-Procedure Risk stratification for thromboembolism: low (2017 ACC periprocedure pathway for NVAF Expert Consensus)    NVAF: 2017 ACC periprocedure pathway for NVAF advises NO bridge for low risk stratification ([id number]-MINm score <=4 and no prior hx of stroke, TIA or systemic embolism)     RECOMMENDATION       Pre-Procedure:  o Hold warfarin for 5 days, until after procedure startin2023   o No Bridge      Post-Procedure:  o Resume warfarin dose if okay with provider doing procedure on night of procedure, 2023 PM: 7.5mg  o Recheck INR ~ 7 days after resuming warfarin       Plan routed to referring provider for approval  ?   Eneida Pacheco [facility]    SUBJECTIVE/OBJECTIVE     Jimi Moreno, a 77 year old male    Goal INR Range: 2.0-3.0     Patient bridged in past: Yes: inpatient with COVID      Wt Readings from Last 3 Encounters:   22 102.4 kg (225 lb 11.2 oz)   22 106.3 kg (234 lb 6 oz)   22 106.6 kg (235 lb)      Ideal body weight: 81 kg (178 lb 10.9 oz)  Adjusted ideal body weight: 89.6 kg (197 lb 7.8 oz)     Estimated body mass index is 29.37 kg/m  as calculated from the following:    Height as of 22: 1.867 m (6' 1.5\").    Weight as of 22: 102.4 kg (225 lb 11.2 oz).    Lab Results   Component Value Date    INR 2.9 (H) 2022    INR 2.7 (H) 10/21/2022    INR 2.8 (H) 2022     Lab Results   Component Value Date    HGB 15.9 10/27/2021    HGB 15.9 2020    HCT 47.4 10/27/2021    HCT 46.1 2020     10/27/2021     2020     Lab Results   Component Value Date    CR 1.26 (H) 10/27/2021    CR 1.20 2020    CR 0.98 07/10/2020     CrCl cannot be calculated (Patient's most recent lab result is older than " the maximum 30 days allowed.).

## 2023-01-03 NOTE — PHARMACY - PREOPERATIVE ASSESSMENT CENTER
Jimi Moreno is scheduled for HERNIORRHAPHY, LEFT INGUINAL, OPEN on 1/16/23 with Dr. Mittal.  Patient is seen in our [facility] anticoagulation clinic at [place] for their warfarin management (INR goal 2-3) for a history of afib with RVR (no h/o stroke or DVT).  Routing to anticoagulation pharmacist for plan for upcoming procedure.     Jose Cruz Biard formerly [facility]  Pre-operative Assessment Center

## 2023-01-04 ENCOUNTER — LAB (OUTPATIENT)
Dept: LAB | Facility: CLINIC | Age: 78
End: 2023-01-04
Payer: COMMERCIAL

## 2023-01-04 ENCOUNTER — PRE VISIT (OUTPATIENT)
Dept: SURGERY | Facility: CLINIC | Age: 78
End: 2023-01-04

## 2023-01-04 ENCOUNTER — OFFICE VISIT (OUTPATIENT)
Dept: SURGERY | Facility: CLINIC | Age: 78
End: 2023-01-04
Payer: COMMERCIAL

## 2023-01-04 ENCOUNTER — ANESTHESIA EVENT (OUTPATIENT)
Dept: SURGERY | Facility: AMBULATORY SURGERY CENTER | Age: 78
End: 2023-01-04
Payer: COMMERCIAL

## 2023-01-04 VITALS
TEMPERATURE: 97.8 F | DIASTOLIC BLOOD PRESSURE: 77 MMHG | HEIGHT: 74 IN | OXYGEN SATURATION: 95 % | RESPIRATION RATE: 16 BRPM | SYSTOLIC BLOOD PRESSURE: 134 MMHG | HEART RATE: 95 BPM | WEIGHT: 229.2 LBS | BODY MASS INDEX: 29.41 KG/M2

## 2023-01-04 DIAGNOSIS — Z01.818 PRE-OP EXAMINATION: ICD-10-CM

## 2023-01-04 DIAGNOSIS — R73.01 IMPAIRED FASTING GLUCOSE: ICD-10-CM

## 2023-01-04 DIAGNOSIS — Z01.818 PRE-OP EXAMINATION: Primary | ICD-10-CM

## 2023-01-04 DIAGNOSIS — K40.90 LEFT INGUINAL HERNIA: ICD-10-CM

## 2023-01-04 LAB
ANION GAP SERPL CALCULATED.3IONS-SCNC: 12 MMOL/L (ref 7–15)
BUN SERPL-MCNC: 15.5 MG/DL (ref 8–23)
CALCIUM SERPL-MCNC: 9 MG/DL (ref 8.8–10.2)
CHLORIDE SERPL-SCNC: 107 MMOL/L (ref 98–107)
CREAT SERPL-MCNC: 1.09 MG/DL (ref 0.67–1.17)
DEPRECATED HCO3 PLAS-SCNC: 21 MMOL/L (ref 22–29)
ERYTHROCYTE [DISTWIDTH] IN BLOOD BY AUTOMATED COUNT: 12.3 % (ref 10–15)
GFR SERPL CREATININE-BSD FRML MDRD: 69 ML/MIN/1.73M2
GLUCOSE SERPL-MCNC: 96 MG/DL (ref 70–99)
HBA1C MFR BLD: 5.7 %
HCT VFR BLD AUTO: 45.1 % (ref 40–53)
HGB BLD-MCNC: 15.2 G/DL (ref 13.3–17.7)
MCH RBC QN AUTO: 31.4 PG (ref 26.5–33)
MCHC RBC AUTO-ENTMCNC: 33.7 G/DL (ref 31.5–36.5)
MCV RBC AUTO: 93 FL (ref 78–100)
PLATELET # BLD AUTO: 174 10E3/UL (ref 150–450)
POTASSIUM SERPL-SCNC: 4.4 MMOL/L (ref 3.4–5.3)
RBC # BLD AUTO: 4.84 10E6/UL (ref 4.4–5.9)
SODIUM SERPL-SCNC: 140 MMOL/L (ref 136–145)
WBC # BLD AUTO: 5 10E3/UL (ref 4–11)

## 2023-01-04 PROCEDURE — 36415 COLL VENOUS BLD VENIPUNCTURE: CPT | Performed by: PATHOLOGY

## 2023-01-04 PROCEDURE — 80048 BASIC METABOLIC PNL TOTAL CA: CPT | Performed by: PATHOLOGY

## 2023-01-04 PROCEDURE — 99203 OFFICE O/P NEW LOW 30 MIN: CPT | Performed by: PHYSICIAN ASSISTANT

## 2023-01-04 PROCEDURE — 85027 COMPLETE CBC AUTOMATED: CPT | Performed by: PATHOLOGY

## 2023-01-04 PROCEDURE — 83036 HEMOGLOBIN GLYCOSYLATED A1C: CPT | Performed by: PHYSICIAN ASSISTANT

## 2023-01-04 ASSESSMENT — ENCOUNTER SYMPTOMS
SEIZURES: 0
DYSRHYTHMIAS: 1

## 2023-01-04 ASSESSMENT — LIFESTYLE VARIABLES: TOBACCO_USE: 0

## 2023-01-04 ASSESSMENT — PAIN SCALES - GENERAL: PAINLEVEL: NO PAIN (0)

## 2023-01-04 NOTE — TELEPHONE ENCOUNTER
Attempted to reach patient and VM is not set up at this time.  Will need to try again later.    Loulou Reardon RN  [facility]

## 2023-01-04 NOTE — H&P
Pre-Operative H & P     CC:  Preoperative exam to assess for increased cardiopulmonary risk while undergoing surgery and anesthesia.    Date of Encounter: 1/4/2023  Primary Care Physician:  Rodolfo Pedersen     Reason for visit:   Encounter Diagnoses   Name Primary?     Left inguinal hernia      Pre-op examination Yes       HPI  Jimi Moreno is a 77 year old male who presents for pre-operative H & P in preparation for  Procedure Information     Case: [id number] Date/Time: 01/16/23 0715    Procedure: HERNIORRHAPHY, LEFT INGUINAL, OPEN (Left: Groin)    Anesthesia type: MAC with Local    Diagnosis: Left inguinal hernia [K40.90]    Pre-op diagnosis: Left inguinal hernia [K40.90]    Location: [place] [address] / [facility]    Providers: Ayo Mittal MD          The patient is a 77-year-old man with a past medical history significant for chronic A. fib, hypertension, impaired fasting glucose, BPH, and alcohol use.  The patient met with Dr. Tillman on 12/22/2022 for left inguinal hernia.  He was counseled on the procedure as above.    History is obtained from the patient and chart review    Hx of abnormal bleeding or anti-platelet use: Coumadin       Past Medical History  Past Medical History:   Diagnosis Date     BPH (benign prostatic hyperplasia)      Chronic atrial fibrillation (H)      Sinusitis acute      Unspecified essential hypertension      Varicose vein        Past Surgical History  Past Surgical History:   Procedure Laterality Date     COLONOSCOPY N/A 11/02/2017    Procedure: COMBINED COLONOSCOPY, SINGLE OR MULTIPLE BIOPSY/POLYPECTOMY BY BIOPSY;;  Surgeon: Sanjay Chang MD;  Location: [facility] OR     COLONOSCOPY WITH CO2 INSUFFLATION N/A 11/02/2017    Procedure: COLONOSCOPY WITH CO2 INSUFFLATION;  COLON SCREEN/ ZOWNIROWYCZ;  Surgeon: Sanjay Chang MD;  Location: [facility] OR       Prior to Admission Medications  Current Outpatient Medications    Medication Sig Dispense Refill     diltiazem ER COATED BEADS (CARDIZEM CD) 360 MG 24 hr capsule Take 1 capsule by mouth once daily (Patient taking differently: Take 360 mg by mouth every evening) 90 capsule 1     finasteride (PROSCAR) 5 MG tablet Take 1 tablet by mouth once daily (Patient taking differently: Take 5 mg by mouth every evening) 90 tablet 1     hydrocortisone (CORTAID) 1 % external cream Apply topically 2 times daily as needed for rash       metoprolol succinate ER (TOPROL XL) 50 MG 24 hr tablet Take 1 tablet by mouth once daily (Patient taking differently: Take 50 mg by mouth every evening) 90 tablet 1     tamsulosin (FLOMAX) 0.4 MG capsule Take 2 capsules by mouth once daily (Patient taking differently: Take 0.8 mg by mouth every evening) 180 capsule 1     warfarin ANTICOAGULANT (COUMADIN) 5 MG tablet TAKE 1 & 1/2 TABLETS ON MONDAY, WEDNESDAY, Friday, AND 1 TABLET ALL OTHER DAYS OF  THE WEEK as directed by INR clinic/ACC nurse (Patient taking differently: TAKE 1 & 1/2 TABLETS ON MONDAY, WEDNESDAY, Friday, AND 1 TABLET ALL OTHER DAYS OF  THE WEEK as directed by INR clinic/ACC nurse. Takes in the evening.) 120 tablet 1       Allergies  Allergies   Allergen Reactions     Pollen Extract/Tree Extract Other (See Comments)     Oak pollen         Social History  Social History     Socioeconomic History     Marital status:      Spouse name: Not on file     Number of children: 4     Years of education: Not on file     Highest education level: Not on file   Occupational History     Comment: retired   Tobacco Use     Smoking status: Never     Smokeless tobacco: Never   Substance and Sexual Activity     Alcohol use: Yes     Comment: 5 pm most days     Drug use: No     Sexual activity: Yes     Partners: Female   Other Topics Concern     Parent/sibling w/ CABG, MI or angioplasty before 65F 55M? No   Social History Narrative     Not on file     Social Determinants of Health     Financial Resource Strain: Not  "on file   Food Insecurity: Not on file   Transportation Needs: Not on file   Physical Activity: Not on file   Stress: Not on file   Social Connections: Not on file   Intimate Partner Violence: Not on file   Housing Stability: Not on file       Family History  Family History   Problem Relation Age of Onset     Cancer No family hx of         no skin cancer       Review of Systems  The complete review of systems is negative other than noted in the HPI or here.   Anesthesia Evaluation   Pt has had prior anesthetic. Type: MAC.        ROS/MED HX  ENT/Pulmonary:  - neg pulmonary ROS  (-) tobacco use   Neurologic:  - neg neurologic ROS  (-) no seizures, no CVA and no TIA   Cardiovascular:     (+) hypertension-----dysrhythmias, a-fib, Previous cardiac testing   Echo: Date: 7/7/20 Results:    Stress Test: Date: Results:    ECG Reviewed: Date: 3/3/21 Results:  Atrial fibrillation with RVR, left axis deviation, incomplete RBBB  Cath: Date: Results:      METS/Exercise Tolerance: 4 - Raking leaves, gardening    Hematologic:  - neg hematologic  ROS     Musculoskeletal:  - neg musculoskeletal ROS     GI/Hepatic: Comment: Left inguinal hernia    (-) GERD   Renal/Genitourinary:     (+) BPH,     Endo: Comment: Impaired fasting glucose A1c 5.5 on 10/27/21      Psychiatric/Substance Use:     (+) alcohol abuse     Infectious Disease:  - neg infectious disease ROS     Malignancy:  - neg malignancy ROS     Other:  - neg other ROS          Resp 16   Ht 1.867 m (6' 1.5\")   Wt 104 kg (229 lb 3.2 oz)   BMI 29.83 kg/m      Physical Exam   Constitutional: Awake, alert, cooperative, no apparent distress, and appears stated age.  Eyes: Pupils equal, round and reactive to light, extra ocular muscles intact, sclera clear, conjunctiva normal.  HENT: Normocephalic, oral pharynx with moist mucus membranes, good dentition - three chipped teeth. No goiter appreciated.   Respiratory: Clear to auscultation bilaterally, no crackles or " wheezing.  Cardiovascular: Regular rate and rhythm, normal S1 and S2, and no murmur noted.  Carotids +2, no bruits. No edema. Palpable pulses to radial  DP and PT arteries.   GI: Normal bowel sounds, soft, non-distended, non-tender, no masses palpated, no hepatosplenomegaly.    Lymph/Hematologic: No cervical lymphadenopathy and no supraclavicular lymphadenopathy.  Genitourinary:  defer  Skin: Warm and dry.  No rashes at anticipated surgical site.   Musculoskeletal: Full ROM of neck. There is no redness, warmth, or swelling of the joints. Gross motor strength is normal.    Neurologic: Awake, alert, oriented to name, place and time. Cranial nerves II-XII are grossly intact. Gait is normal.   Neuropsychiatric: Calm, cooperative. Normal affect.     Prior Labs/Diagnostic Studies   All labs and imaging personally reviewed     EKG 3/3/21  Atrial fibrillation with RVR, left axis deviation, incomplete RBBB    Echo 7/7/20  Interpretation Summary     The rhythm was atrial fibrillation.  Left ventricular systolic function is normal.  The visual ejection fraction is estimated at 60-65%.  The left ventricle is normal in size.  There is mild concentric left ventricular hypertrophy.  The right ventricle is normal in structure, function and size.  There is moderate biatrial enlargement.  Doppler interrogation does not demonstrate significant stenosis or  insufficiency involving cardiac valves.     No old studies for compariosn.    Tootie 9/2/20      The patient's records and results personally reviewed by this provider.     Outside records reviewed from: No outside records available    LAB/DIAGNOSTIC STUDIES TODAY:     Latest Reference Range & Units 01/04/23 09:48   Sodium 136 - 145 mmol/L 140   Potassium 3.4 - 5.3 mmol/L 4.4   Chloride 98 - 107 mmol/L 107   Carbon Dioxide (CO2) 22 - 29 mmol/L 21 (L)   Urea Nitrogen 8.0 - 23.0 mg/dL 15.5   Creatinine 0.67 - 1.17 mg/dL 1.09   GFR Estimate >60 mL/min/1.73m2 69   Calcium 8.8 - 10.2 mg/dL  9.0   Anion Gap 7 - 15 mmol/L 12   Glucose 70 - 99 mg/dL 96   WBC 4.0 - 11.0 10e3/uL 5.0   Hemoglobin 13.3 - 17.7 g/dL 15.2   Hematocrit 40.0 - 53.0 % 45.1   Platelet Count 150 - 450 10e3/uL 174   RBC Count 4.40 - 5.90 10e6/uL 4.84   MCV 78 - 100 fL 93   MCH 26.5 - 33.0 pg 31.4   MCHC 31.5 - 36.5 g/dL 33.7   RDW 10.0 - 15.0 % 12.3       Assessment      Jimi Moreno is a 77 year old male seen as a PAC referral for risk assessment and optimization for anesthesia.    Plan/Recommendations  Pt will be optimized for the proposed procedure.  See below for details on the assessment, risk, and preoperative recommendations    NEUROLOGY  - No history of TIA, CVA or seizure  -Post Op delirium risk factors:  Age    ENT  - No current airway concerns.  Will need to be reassessed day of surgery.  Mallampati: I  TM: > 3    CARDIAC  - Afib  [id number] score:  3  - Hypertension  Well controlled   - METS (Metabolic Equivalents)  Patient performs 4 or more METS exercise without symptoms            Total Score: 0      RCRI-Very low risk: Class 1 0.4% complication rate            Total Score: 0    ~ The patient reports he is able to go up multiple flights of stairs. He normally shovels but due to his hernia he hasn't been these past few months.     PULMONARY  - Obstructive Sleep Apnea  No current risk of obstructive sleep apnea   ZACH Medium Risk            Total Score: 3    ZACH: Hypertension    ZACH: Over 50 ys old    ZACH: Male      - Denies asthma or inhaler use  - Tobacco History      History   Smoking Status     Never   Smokeless Tobacco     Never       GI  ~ Left inguinal hernia - procedure as above.   PONV Medium Risk  Total Score: 2           1 AN PONV: Patient is not a current smoker    1 AN PONV: Intended Post Op Opioids        /RENAL  - Baseline Creatinine  1.26  ~ BPH - continue proscar and flomax    ENDOCRINE    - BMI: Estimated body mass index is 29.83 kg/m  as calculated from the following:    Height as of this  "encounter: 1.867 m (6' 1.5\").    Weight as of this encounter: 104 kg (229 lb 3.2 oz).  Overweight (BMI 25.0-29.9)  - No history of Diabetes Mellitus   ~ Impaired fasting glucose - A1c was 5.5 on 10/2021    HEME  VTE Low Risk 0.5%            Total Score: 3    VTE: Greater than 59 yrs old    VTE: Male      - Coagulopathy second to Warfarin (Coumadin, Jantoven) - plan for 5 day hold without bridge.     PSYCH  - Alcohol use - the patient on average has 4 drinks a night. He reports no issues with not having a drink. He's never had withdrawal symptoms and never been hospitalized. The patient was counseled to not drink for 24 hours prior to surgery.     Different anesthesia methods/types have been discussed with the patient, but they are aware that the final plan will be decided by the assigned anesthesia provider on the date of service.      The patient is optimized for their procedure. AVS with information on surgery time/arrival time, meds and NPO status given by nursing staff. No further diagnostic testing indicated.      On the day of service:     Prep time: 7 minutes  Visit time: 19 minutes  Documentation time: 10 minutes  ------------------------------------------  Total time: 36 minutes      Aubrie Betancourt PA-C  Preoperative Assessment Center  [facility]  Clinic and Surgery Center  Phone: [phone]  Fax: [phone]  "

## 2023-01-04 NOTE — H&P (VIEW-ONLY)
Pre-Operative H & P     CC:  Preoperative exam to assess for increased cardiopulmonary risk while undergoing surgery and anesthesia.    Date of Encounter: 1/4/2023  Primary Care Physician:  Rodolfo Pedersen     Reason for visit:   Encounter Diagnoses   Name Primary?     Left inguinal hernia      Pre-op examination Yes       HPI  Jimi Moreno is a 77 year old male who presents for pre-operative H & P in preparation for  Procedure Information     Case: [id number] Date/Time: 01/16/23 0715    Procedure: HERNIORRHAPHY, LEFT INGUINAL, OPEN (Left: Groin)    Anesthesia type: MAC with Local    Diagnosis: Left inguinal hernia [K40.90]    Pre-op diagnosis: Left inguinal hernia [K40.90]    Location: [place] [address] / [facility]    Providers: Ayo Mittal MD          The patient is a 77-year-old man with a past medical history significant for chronic A. fib, hypertension, impaired fasting glucose, BPH, and alcohol use.  The patient met with Dr. Tillman on 12/22/2022 for left inguinal hernia.  He was counseled on the procedure as above.    History is obtained from the patient and chart review    Hx of abnormal bleeding or anti-platelet use: Coumadin       Past Medical History  Past Medical History:   Diagnosis Date     BPH (benign prostatic hyperplasia)      Chronic atrial fibrillation (H)      Sinusitis acute      Unspecified essential hypertension      Varicose vein        Past Surgical History  Past Surgical History:   Procedure Laterality Date     COLONOSCOPY N/A 11/02/2017    Procedure: COMBINED COLONOSCOPY, SINGLE OR MULTIPLE BIOPSY/POLYPECTOMY BY BIOPSY;;  Surgeon: Sanjay Chang MD;  Location: [facility] OR     COLONOSCOPY WITH CO2 INSUFFLATION N/A 11/02/2017    Procedure: COLONOSCOPY WITH CO2 INSUFFLATION;  COLON SCREEN/ ZOWNIROWYCZ;  Surgeon: Sanjay Chang MD;  Location: [facility] OR       Prior to Admission Medications  Current Outpatient Medications    Medication Sig Dispense Refill     diltiazem ER COATED BEADS (CARDIZEM CD) 360 MG 24 hr capsule Take 1 capsule by mouth once daily (Patient taking differently: Take 360 mg by mouth every evening) 90 capsule 1     finasteride (PROSCAR) 5 MG tablet Take 1 tablet by mouth once daily (Patient taking differently: Take 5 mg by mouth every evening) 90 tablet 1     hydrocortisone (CORTAID) 1 % external cream Apply topically 2 times daily as needed for rash       metoprolol succinate ER (TOPROL XL) 50 MG 24 hr tablet Take 1 tablet by mouth once daily (Patient taking differently: Take 50 mg by mouth every evening) 90 tablet 1     tamsulosin (FLOMAX) 0.4 MG capsule Take 2 capsules by mouth once daily (Patient taking differently: Take 0.8 mg by mouth every evening) 180 capsule 1     warfarin ANTICOAGULANT (COUMADIN) 5 MG tablet TAKE 1 & 1/2 TABLETS ON MONDAY, WEDNESDAY, Friday, AND 1 TABLET ALL OTHER DAYS OF  THE WEEK as directed by INR clinic/ACC nurse (Patient taking differently: TAKE 1 & 1/2 TABLETS ON MONDAY, WEDNESDAY, Friday, AND 1 TABLET ALL OTHER DAYS OF  THE WEEK as directed by INR clinic/ACC nurse. Takes in the evening.) 120 tablet 1       Allergies  Allergies   Allergen Reactions     Pollen Extract/Tree Extract Other (See Comments)     Oak pollen         Social History  Social History     Socioeconomic History     Marital status:      Spouse name: Not on file     Number of children: 4     Years of education: Not on file     Highest education level: Not on file   Occupational History     Comment: retired   Tobacco Use     Smoking status: Never     Smokeless tobacco: Never   Substance and Sexual Activity     Alcohol use: Yes     Comment: 5 pm most days     Drug use: No     Sexual activity: Yes     Partners: Female   Other Topics Concern     Parent/sibling w/ CABG, MI or angioplasty before 65F 55M? No   Social History Narrative     Not on file     Social Determinants of Health     Financial Resource Strain: Not  "on file   Food Insecurity: Not on file   Transportation Needs: Not on file   Physical Activity: Not on file   Stress: Not on file   Social Connections: Not on file   Intimate Partner Violence: Not on file   Housing Stability: Not on file       Family History  Family History   Problem Relation Age of Onset     Cancer No family hx of         no skin cancer       Review of Systems  The complete review of systems is negative other than noted in the HPI or here.   Anesthesia Evaluation   Pt has had prior anesthetic. Type: MAC.        ROS/MED HX  ENT/Pulmonary:  - neg pulmonary ROS  (-) tobacco use   Neurologic:  - neg neurologic ROS  (-) no seizures, no CVA and no TIA   Cardiovascular:     (+) hypertension-----dysrhythmias, a-fib, Previous cardiac testing   Echo: Date: 7/7/20 Results:    Stress Test: Date: Results:    ECG Reviewed: Date: 3/3/21 Results:  Atrial fibrillation with RVR, left axis deviation, incomplete RBBB  Cath: Date: Results:      METS/Exercise Tolerance: 4 - Raking leaves, gardening    Hematologic:  - neg hematologic  ROS     Musculoskeletal:  - neg musculoskeletal ROS     GI/Hepatic: Comment: Left inguinal hernia    (-) GERD   Renal/Genitourinary:     (+) BPH,     Endo: Comment: Impaired fasting glucose A1c 5.5 on 10/27/21      Psychiatric/Substance Use:     (+) alcohol abuse     Infectious Disease:  - neg infectious disease ROS     Malignancy:  - neg malignancy ROS     Other:  - neg other ROS          Resp 16   Ht 1.867 m (6' 1.5\")   Wt 104 kg (229 lb 3.2 oz)   BMI 29.83 kg/m      Physical Exam   Constitutional: Awake, alert, cooperative, no apparent distress, and appears stated age.  Eyes: Pupils equal, round and reactive to light, extra ocular muscles intact, sclera clear, conjunctiva normal.  HENT: Normocephalic, oral pharynx with moist mucus membranes, good dentition - three chipped teeth. No goiter appreciated.   Respiratory: Clear to auscultation bilaterally, no crackles or " wheezing.  Cardiovascular: Regular rate and rhythm, normal S1 and S2, and no murmur noted.  Carotids +2, no bruits. No edema. Palpable pulses to radial  DP and PT arteries.   GI: Normal bowel sounds, soft, non-distended, non-tender, no masses palpated, no hepatosplenomegaly.    Lymph/Hematologic: No cervical lymphadenopathy and no supraclavicular lymphadenopathy.  Genitourinary:  defer  Skin: Warm and dry.  No rashes at anticipated surgical site.   Musculoskeletal: Full ROM of neck. There is no redness, warmth, or swelling of the joints. Gross motor strength is normal.    Neurologic: Awake, alert, oriented to name, place and time. Cranial nerves II-XII are grossly intact. Gait is normal.   Neuropsychiatric: Calm, cooperative. Normal affect.     Prior Labs/Diagnostic Studies   All labs and imaging personally reviewed     EKG 3/3/21  Atrial fibrillation with RVR, left axis deviation, incomplete RBBB    Echo 7/7/20  Interpretation Summary     The rhythm was atrial fibrillation.  Left ventricular systolic function is normal.  The visual ejection fraction is estimated at 60-65%.  The left ventricle is normal in size.  There is mild concentric left ventricular hypertrophy.  The right ventricle is normal in structure, function and size.  There is moderate biatrial enlargement.  Doppler interrogation does not demonstrate significant stenosis or  insufficiency involving cardiac valves.     No old studies for compariosn.    Tootie 9/2/20      The patient's records and results personally reviewed by this provider.     Outside records reviewed from: No outside records available    LAB/DIAGNOSTIC STUDIES TODAY:     Latest Reference Range & Units 01/04/23 09:48   Sodium 136 - 145 mmol/L 140   Potassium 3.4 - 5.3 mmol/L 4.4   Chloride 98 - 107 mmol/L 107   Carbon Dioxide (CO2) 22 - 29 mmol/L 21 (L)   Urea Nitrogen 8.0 - 23.0 mg/dL 15.5   Creatinine 0.67 - 1.17 mg/dL 1.09   GFR Estimate >60 mL/min/1.73m2 69   Calcium 8.8 - 10.2 mg/dL  9.0   Anion Gap 7 - 15 mmol/L 12   Glucose 70 - 99 mg/dL 96   WBC 4.0 - 11.0 10e3/uL 5.0   Hemoglobin 13.3 - 17.7 g/dL 15.2   Hematocrit 40.0 - 53.0 % 45.1   Platelet Count 150 - 450 10e3/uL 174   RBC Count 4.40 - 5.90 10e6/uL 4.84   MCV 78 - 100 fL 93   MCH 26.5 - 33.0 pg 31.4   MCHC 31.5 - 36.5 g/dL 33.7   RDW 10.0 - 15.0 % 12.3       Assessment      Jimi Moreno is a 77 year old male seen as a PAC referral for risk assessment and optimization for anesthesia.    Plan/Recommendations  Pt will be optimized for the proposed procedure.  See below for details on the assessment, risk, and preoperative recommendations    NEUROLOGY  - No history of TIA, CVA or seizure  -Post Op delirium risk factors:  Age    ENT  - No current airway concerns.  Will need to be reassessed day of surgery.  Mallampati: I  TM: > 3    CARDIAC  - Afib  [id number] score:  3  - Hypertension  Well controlled   - METS (Metabolic Equivalents)  Patient performs 4 or more METS exercise without symptoms            Total Score: 0      RCRI-Very low risk: Class 1 0.4% complication rate            Total Score: 0    ~ The patient reports he is able to go up multiple flights of stairs. He normally shovels but due to his hernia he hasn't been these past few months.     PULMONARY  - Obstructive Sleep Apnea  No current risk of obstructive sleep apnea   ZACH Medium Risk            Total Score: 3    ZACH: Hypertension    ZACH: Over 50 ys old    ZACH: Male      - Denies asthma or inhaler use  - Tobacco History      History   Smoking Status     Never   Smokeless Tobacco     Never       GI  ~ Left inguinal hernia - procedure as above.   PONV Medium Risk  Total Score: 2           1 AN PONV: Patient is not a current smoker    1 AN PONV: Intended Post Op Opioids        /RENAL  - Baseline Creatinine  1.26  ~ BPH - continue proscar and flomax    ENDOCRINE    - BMI: Estimated body mass index is 29.83 kg/m  as calculated from the following:    Height as of this  "encounter: 1.867 m (6' 1.5\").    Weight as of this encounter: 104 kg (229 lb 3.2 oz).  Overweight (BMI 25.0-29.9)  - No history of Diabetes Mellitus   ~ Impaired fasting glucose - A1c was 5.5 on 10/2021    HEME  VTE Low Risk 0.5%            Total Score: 3    VTE: Greater than 59 yrs old    VTE: Male      - Coagulopathy second to Warfarin (Coumadin, Jantoven) - plan for 5 day hold without bridge.     PSYCH  - Alcohol use - the patient on average has 4 drinks a night. He reports no issues with not having a drink. He's never had withdrawal symptoms and never been hospitalized. The patient was counseled to not drink for 24 hours prior to surgery.     Different anesthesia methods/types have been discussed with the patient, but they are aware that the final plan will be decided by the assigned anesthesia provider on the date of service.      The patient is optimized for their procedure. AVS with information on surgery time/arrival time, meds and NPO status given by nursing staff. No further diagnostic testing indicated.      On the day of service:     Prep time: 7 minutes  Visit time: 19 minutes  Documentation time: 10 minutes  ------------------------------------------  Total time: 36 minutes      Aubrie Betancourt PA-C  Preoperative Assessment Center  [facility]  Clinic and Surgery Center  Phone: [phone]  Fax: [phone]  "

## 2023-01-04 NOTE — PATIENT INSTRUCTIONS
Preparing for Your Surgery      Name:  Jimi Moreno   MRN:  [id number]   :  1945   Today's Date:  2023         Arriving for surgery:  Surgery date:  2023  Arrival time:  5:45 AM    Restrictions due to COVID 19:    2 visitors may accompany each patient  Visitors may wait for patient in the Surgery Center Waiting room  All visitors must wear a mask and socially distance        parking is available for anyone with mobility limitations or disabilities. (Monday- Friday 7 am- 5 pm)    Please come to:    [facility]  [address] [place] [id number]    Check in on the 5th floor, Ambulatory Surgery Center.    What can I eat or drink?    -  You may eat and drink normally until 8 hours before arrival time  (Until 9:45 PM)  -  You may have clear liquids until 2 hours before arrival time  (Until 3:45 AM)    Examples of clear liquids:  Water  Clear broth  Juices (apple, white grape, white cranberry  and cider) without pulp  Noncarbonated, powder based beverages  (lemonade and Rickie-Aid)  Sodas (Sprite, 7-Up, ginger ale and seltzer)  Coffee or tea (without milk or cream)  Gatorade    --No alcohol for at least 24 hours before surgery    Which medicines can I take?    Hold Aspirin for 7 days before surgery.   Hold Multivitamins for 7 days before surgery.  Hold Supplements for 7 days before surgery.  Hold Ibuprofen (Advil, Motrin) for 1 day before surgery--unless otherwise directed by surgeon.  Hold Naproxen (Aleve) for 4 days before surgery.    **HOLD Warfarin per anticoagulation clinic instructions. (5 days prior, last dose 1/10/2023)        How do I prepare myself?  - Please take 2 showers before surgery using Scrubcare or Hibiclens soap.    Use this soap only from the neck to your toes.     Leave the soap on your skin for one minute--then rinse thoroughly.      You may use your own shampoo and conditioner; no other hair products.   - Please remove all jewelry and body  piercings.  - No lotions, deodorants or fragrance.  - No makeup or fingernail polish.   - Bring your ID and insurance card.    -If you have a Deep Brain Stimulator, a Spinal Cord Stimulator or any implanted Neuro device you must bring the remote to the Surgery Center          ALL PATIENTS ARE REQUIRED TO HAVE A RESPONSIBLE ADULT TO DRIVE AND BE IN ATTENDANCE WITH THEM FOR 24 HOURS FOLLOWING SURGERY       Covid testing policy as of 12/06/2022  Your surgeon will notify and schedule you for a COVID test if one is needed before surgery--please direct any questions or COVID symptoms to your surgeon      Questions or Concerns:    -For questions regarding the day of surgery please contact the Ambulatory Surgery Center at [phone].    -If you have health changes between today and your surgery please contact your surgeon.     For questions after surgery please call your surgeons office.

## 2023-01-04 NOTE — TELEPHONE ENCOUNTER
Spoke with patient and instructions given.  Next INR scheduled.    Loulou Reardon RN  [facility]

## 2023-01-16 ENCOUNTER — HOSPITAL ENCOUNTER (OUTPATIENT)
Facility: AMBULATORY SURGERY CENTER | Age: 78
Discharge: HOME | End: 2023-01-16
Attending: SURGERY
Payer: COMMERCIAL

## 2023-01-16 ENCOUNTER — ANESTHESIA (OUTPATIENT)
Dept: SURGERY | Facility: AMBULATORY SURGERY CENTER | Age: 78
End: 2023-01-16
Payer: COMMERCIAL

## 2023-01-16 VITALS
WEIGHT: 225 LBS | DIASTOLIC BLOOD PRESSURE: 82 MMHG | HEIGHT: 74 IN | HEART RATE: 90 BPM | BODY MASS INDEX: 28.88 KG/M2 | SYSTOLIC BLOOD PRESSURE: 117 MMHG | OXYGEN SATURATION: 94 % | RESPIRATION RATE: 16 BRPM | TEMPERATURE: 97.9 F

## 2023-01-16 DIAGNOSIS — K40.90 LEFT INGUINAL HERNIA: ICD-10-CM

## 2023-01-16 PROCEDURE — 49505 PRP I/HERN INIT REDUC >5 YR: CPT | Mod: LT | Performed by: SURGERY

## 2023-01-16 PROCEDURE — 49505 PRP I/HERN INIT REDUC >5 YR: CPT | Mod: LT

## 2023-01-16 DEVICE — SELF-GRIPPING POLYESTER MESH,POLYESTER WITH POLYLACTIC ACID GRIPS
Type: IMPLANTABLE DEVICE | Site: GROIN | Status: FUNCTIONAL
Brand: PROGRIP

## 2023-01-16 RX ORDER — PROPOFOL 10 MG/ML
INJECTION, EMULSION INTRAVENOUS PRN
Status: DISCONTINUED | OUTPATIENT
Start: 2023-01-16 | End: 2023-01-16

## 2023-01-16 RX ORDER — OXYCODONE HYDROCHLORIDE 5 MG/1
10 TABLET ORAL EVERY 4 HOURS PRN
Status: DISCONTINUED | OUTPATIENT
Start: 2023-01-16 | End: 2023-01-17 | Stop reason: HOSPADM

## 2023-01-16 RX ORDER — CEFAZOLIN SODIUM 2 G/50ML
2 SOLUTION INTRAVENOUS SEE ADMIN INSTRUCTIONS
Status: DISCONTINUED | OUTPATIENT
Start: 2023-01-16 | End: 2023-01-16 | Stop reason: HOSPADM

## 2023-01-16 RX ORDER — ONDANSETRON 2 MG/ML
4 INJECTION INTRAMUSCULAR; INTRAVENOUS EVERY 30 MIN PRN
Status: DISCONTINUED | OUTPATIENT
Start: 2023-01-16 | End: 2023-01-17 | Stop reason: HOSPADM

## 2023-01-16 RX ORDER — FENTANYL CITRATE 50 UG/ML
25 INJECTION, SOLUTION INTRAMUSCULAR; INTRAVENOUS
Status: DISCONTINUED | OUTPATIENT
Start: 2023-01-16 | End: 2023-01-17 | Stop reason: HOSPADM

## 2023-01-16 RX ORDER — FENTANYL CITRATE 50 UG/ML
INJECTION, SOLUTION INTRAMUSCULAR; INTRAVENOUS PRN
Status: DISCONTINUED | OUTPATIENT
Start: 2023-01-16 | End: 2023-01-16

## 2023-01-16 RX ORDER — GLYCOPYRROLATE 0.2 MG/ML
INJECTION, SOLUTION INTRAMUSCULAR; INTRAVENOUS PRN
Status: DISCONTINUED | OUTPATIENT
Start: 2023-01-16 | End: 2023-01-16

## 2023-01-16 RX ORDER — OXYCODONE HYDROCHLORIDE 5 MG/1
5 TABLET ORAL EVERY 4 HOURS PRN
Status: DISCONTINUED | OUTPATIENT
Start: 2023-01-16 | End: 2023-01-17 | Stop reason: HOSPADM

## 2023-01-16 RX ORDER — HYDROMORPHONE HYDROCHLORIDE 1 MG/ML
0.4 INJECTION, SOLUTION INTRAMUSCULAR; INTRAVENOUS; SUBCUTANEOUS EVERY 5 MIN PRN
Status: DISCONTINUED | OUTPATIENT
Start: 2023-01-16 | End: 2023-01-16 | Stop reason: HOSPADM

## 2023-01-16 RX ORDER — LIDOCAINE 40 MG/G
CREAM TOPICAL
Status: DISCONTINUED | OUTPATIENT
Start: 2023-01-16 | End: 2023-01-16 | Stop reason: HOSPADM

## 2023-01-16 RX ORDER — MEPERIDINE HYDROCHLORIDE 25 MG/ML
12.5 INJECTION INTRAMUSCULAR; INTRAVENOUS; SUBCUTANEOUS
Status: DISCONTINUED | OUTPATIENT
Start: 2023-01-16 | End: 2023-01-17 | Stop reason: HOSPADM

## 2023-01-16 RX ORDER — FENTANYL CITRATE 50 UG/ML
25 INJECTION, SOLUTION INTRAMUSCULAR; INTRAVENOUS EVERY 5 MIN PRN
Status: DISCONTINUED | OUTPATIENT
Start: 2023-01-16 | End: 2023-01-16 | Stop reason: HOSPADM

## 2023-01-16 RX ORDER — FENTANYL CITRATE 50 UG/ML
50 INJECTION, SOLUTION INTRAMUSCULAR; INTRAVENOUS EVERY 5 MIN PRN
Status: DISCONTINUED | OUTPATIENT
Start: 2023-01-16 | End: 2023-01-16 | Stop reason: HOSPADM

## 2023-01-16 RX ORDER — SODIUM CHLORIDE, SODIUM LACTATE, POTASSIUM CHLORIDE, CALCIUM CHLORIDE 600; 310; 30; 20 MG/100ML; MG/100ML; MG/100ML; MG/100ML
INJECTION, SOLUTION INTRAVENOUS CONTINUOUS PRN
Status: DISCONTINUED | OUTPATIENT
Start: 2023-01-16 | End: 2023-01-16

## 2023-01-16 RX ORDER — MAGNESIUM HYDROXIDE 1200 MG/15ML
LIQUID ORAL PRN
Status: DISCONTINUED | OUTPATIENT
Start: 2023-01-16 | End: 2023-01-16 | Stop reason: HOSPADM

## 2023-01-16 RX ORDER — HYDROMORPHONE HYDROCHLORIDE 1 MG/ML
0.2 INJECTION, SOLUTION INTRAMUSCULAR; INTRAVENOUS; SUBCUTANEOUS EVERY 5 MIN PRN
Status: DISCONTINUED | OUTPATIENT
Start: 2023-01-16 | End: 2023-01-16 | Stop reason: HOSPADM

## 2023-01-16 RX ORDER — SODIUM CHLORIDE, SODIUM LACTATE, POTASSIUM CHLORIDE, CALCIUM CHLORIDE 600; 310; 30; 20 MG/100ML; MG/100ML; MG/100ML; MG/100ML
INJECTION, SOLUTION INTRAVENOUS CONTINUOUS
Status: DISCONTINUED | OUTPATIENT
Start: 2023-01-16 | End: 2023-01-16 | Stop reason: HOSPADM

## 2023-01-16 RX ORDER — ONDANSETRON 4 MG/1
4 TABLET, ORALLY DISINTEGRATING ORAL EVERY 30 MIN PRN
Status: DISCONTINUED | OUTPATIENT
Start: 2023-01-16 | End: 2023-01-17 | Stop reason: HOSPADM

## 2023-01-16 RX ORDER — SODIUM CHLORIDE, SODIUM LACTATE, POTASSIUM CHLORIDE, CALCIUM CHLORIDE 600; 310; 30; 20 MG/100ML; MG/100ML; MG/100ML; MG/100ML
INJECTION, SOLUTION INTRAVENOUS CONTINUOUS
Status: DISCONTINUED | OUTPATIENT
Start: 2023-01-16 | End: 2023-01-17 | Stop reason: HOSPADM

## 2023-01-16 RX ORDER — LIDOCAINE HYDROCHLORIDE 20 MG/ML
INJECTION, SOLUTION INFILTRATION; PERINEURAL PRN
Status: DISCONTINUED | OUTPATIENT
Start: 2023-01-16 | End: 2023-01-16

## 2023-01-16 RX ORDER — PROPOFOL 10 MG/ML
INJECTION, EMULSION INTRAVENOUS CONTINUOUS PRN
Status: DISCONTINUED | OUTPATIENT
Start: 2023-01-16 | End: 2023-01-16

## 2023-01-16 RX ORDER — ONDANSETRON 2 MG/ML
INJECTION INTRAMUSCULAR; INTRAVENOUS PRN
Status: DISCONTINUED | OUTPATIENT
Start: 2023-01-16 | End: 2023-01-16

## 2023-01-16 RX ORDER — AMOXICILLIN 250 MG
1-2 CAPSULE ORAL 2 TIMES DAILY
Qty: 30 TABLET | Refills: 0 | Status: SHIPPED | OUTPATIENT
Start: 2023-01-16 | End: 2024-01-22

## 2023-01-16 RX ORDER — HYDROCODONE BITARTRATE AND ACETAMINOPHEN 5; 325 MG/1; MG/1
1-2 TABLET ORAL EVERY 4 HOURS PRN
Qty: 15 TABLET | Refills: 0 | Status: SHIPPED | OUTPATIENT
Start: 2023-01-16 | End: 2024-01-22

## 2023-01-16 RX ORDER — ACETAMINOPHEN 325 MG/1
975 TABLET ORAL ONCE
Status: COMPLETED | OUTPATIENT
Start: 2023-01-16 | End: 2023-01-16

## 2023-01-16 RX ORDER — CEFAZOLIN SODIUM 2 G/50ML
2 SOLUTION INTRAVENOUS
Status: COMPLETED | OUTPATIENT
Start: 2023-01-16 | End: 2023-01-16

## 2023-01-16 RX ADMIN — GLYCOPYRROLATE 0.2 MG: 0.2 INJECTION, SOLUTION INTRAMUSCULAR; INTRAVENOUS at 07:46

## 2023-01-16 RX ADMIN — Medication 200 MCG: at 07:58

## 2023-01-16 RX ADMIN — LIDOCAINE HYDROCHLORIDE 80 MG: 20 INJECTION, SOLUTION INFILTRATION; PERINEURAL at 07:13

## 2023-01-16 RX ADMIN — Medication 200 MCG: at 07:33

## 2023-01-16 RX ADMIN — Medication 200 MCG: at 07:48

## 2023-01-16 RX ADMIN — Medication 200 MCG: at 08:08

## 2023-01-16 RX ADMIN — PROPOFOL 20 MG: 10 INJECTION, EMULSION INTRAVENOUS at 07:19

## 2023-01-16 RX ADMIN — ACETAMINOPHEN 975 MG: 325 TABLET ORAL at 06:35

## 2023-01-16 RX ADMIN — PROPOFOL 40 MG: 10 INJECTION, EMULSION INTRAVENOUS at 07:17

## 2023-01-16 RX ADMIN — PROPOFOL 40 MG: 10 INJECTION, EMULSION INTRAVENOUS at 07:45

## 2023-01-16 RX ADMIN — ONDANSETRON 4 MG: 2 INJECTION INTRAMUSCULAR; INTRAVENOUS at 07:30

## 2023-01-16 RX ADMIN — PROPOFOL 150 MCG/KG/MIN: 10 INJECTION, EMULSION INTRAVENOUS at 07:14

## 2023-01-16 RX ADMIN — SODIUM CHLORIDE, SODIUM LACTATE, POTASSIUM CHLORIDE, CALCIUM CHLORIDE: 600; 310; 30; 20 INJECTION, SOLUTION INTRAVENOUS at 07:09

## 2023-01-16 RX ADMIN — Medication 200 MCG: at 08:10

## 2023-01-16 RX ADMIN — CEFAZOLIN SODIUM 2 G: 2 SOLUTION INTRAVENOUS at 07:10

## 2023-01-16 RX ADMIN — FENTANYL CITRATE 25 MCG: 50 INJECTION, SOLUTION INTRAMUSCULAR; INTRAVENOUS at 07:14

## 2023-01-16 ASSESSMENT — ENCOUNTER SYMPTOMS
DYSRHYTHMIAS: 1
SEIZURES: 0

## 2023-01-16 ASSESSMENT — LIFESTYLE VARIABLES: TOBACCO_USE: 0

## 2023-01-16 NOTE — INTERVAL H&P NOTE
"I have reviewed the surgical (or preoperative) H&P that is linked to this encounter, and examined the patient. There are no significant changes    Clinical Conditions Present on Arrival:  Clinically Significant Risk Factors Present on Admission                  # Drug Induced Coagulation Defect: home medication list includes an anticoagulant medication   # Overweight: Estimated body mass index is 29.28 kg/m  as calculated from the following:    Height as of this encounter: 1.867 m (6' 1.5\").    Weight as of this encounter: 102.1 kg (225 lb).       "

## 2023-01-16 NOTE — ANESTHESIA PREPROCEDURE EVALUATION
Anesthesia Pre-Procedure Evaluation    Patient: Jimi Moreno   MRN: [id number] : 1945        Procedure : Procedure(s):  HERNIORRHAPHY, LEFT INGUINAL, OPEN          Past Medical History:   Diagnosis Date     BPH (benign prostatic hyperplasia)      Chronic atrial fibrillation (H)      Sinusitis acute      Unspecified essential hypertension      Varicose vein       Past Surgical History:   Procedure Laterality Date     COLONOSCOPY N/A 2017    Procedure: COMBINED COLONOSCOPY, SINGLE OR MULTIPLE BIOPSY/POLYPECTOMY BY BIOPSY;;  Surgeon: Sanjay Chang MD;  Location: [facility] OR     COLONOSCOPY WITH CO2 INSUFFLATION N/A 2017    Procedure: COLONOSCOPY WITH CO2 INSUFFLATION;  COLON SCREEN/ ZOWNIROWYCZ;  Surgeon: Sanjay Chang MD;  Location: [facility] OR      Allergies   Allergen Reactions     Pollen Extract/Tree Extract Other (See Comments)     Oak pollen        Social History     Tobacco Use     Smoking status: Never     Smokeless tobacco: Never   Substance Use Topics     Alcohol use: Yes     Comment: 5 pm most days      Wt Readings from Last 1 Encounters:   23 102.1 kg (225 lb)        Anesthesia Evaluation   Pt has had prior anesthetic. Type: MAC.    No history of anesthetic complications       ROS/MED HX  ENT/Pulmonary:  - neg pulmonary ROS  (-) tobacco use   Neurologic:  - neg neurologic ROS  (-) no seizures, no CVA and no TIA   Cardiovascular:     (+) hypertension-----dysrhythmias, a-fib, Previous cardiac testing   Echo: Date: 20 Results:    Stress Test: Date: Results:    ECG Reviewed: Date: 3/3/21 Results:  Atrial fibrillation with RVR, left axis deviation, incomplete RBBB  Cath: Date: Results:      METS/Exercise Tolerance: 4 - Raking leaves, gardening    Hematologic:  - neg hematologic  ROS     Musculoskeletal:  - neg musculoskeletal ROS     GI/Hepatic: Comment: Left inguinal hernia    (-) GERD   Renal/Genitourinary:     (+) BPH,     Endo: Comment: Impaired fasting glucose A1c 5.5  on 10/27/21      Psychiatric/Substance Use:     (+) alcohol abuse     Infectious Disease:  - neg infectious disease ROS     Malignancy:  - neg malignancy ROS     Other:  - neg other ROS          Physical Exam    Airway  airway exam normal           Respiratory Devices and Support         Dental  no notable dental history         Cardiovascular   cardiovascular exam normal          Pulmonary   pulmonary exam normal                OUTSIDE LABS:  CBC:   Lab Results   Component Value Date    WBC 5.0 01/04/2023    WBC 6.5 10/27/2021    HGB 15.2 01/04/2023    HGB 15.9 10/27/2021    HCT 45.1 01/04/2023    HCT 47.4 10/27/2021     01/04/2023     10/27/2021     BMP:   Lab Results   Component Value Date     01/04/2023     10/27/2021    POTASSIUM 4.4 01/04/2023    POTASSIUM 4.5 10/27/2021    CHLORIDE 107 01/04/2023    CHLORIDE 112 (H) 10/27/2021    CO2 21 (L) 01/04/2023    CO2 25 10/27/2021    BUN 15.5 01/04/2023    BUN 17 10/27/2021    CR 1.09 01/04/2023    CR 1.26 (H) 10/27/2021    GLC 96 01/04/2023     (H) 10/27/2021     COAGS:   Lab Results   Component Value Date    INR 2.9 (H) 12/02/2022     POC:   Lab Results   Component Value Date     (H) 07/09/2020     HEPATIC:   Lab Results   Component Value Date    ALBUMIN 3.9 10/27/2021    PROTTOTAL 7.1 10/27/2021    ALT 31 10/27/2021    AST 14 10/27/2021    ALKPHOS 79 10/27/2021    BILITOTAL 0.7 10/27/2021     OTHER:   Lab Results   Component Value Date    LACT 1.5 07/07/2020    A1C 5.7 (H) 01/04/2023    MELIDA 9.0 01/04/2023    PHOS 2.4 (L) 07/07/2020    MAG 2.4 (H) 07/08/2020    TSH 2.03 10/27/2021    .0 (H) 07/07/2020       Anesthesia Plan    ASA Status:  2   NPO Status:  NPO Appropriate    Anesthesia Type: MAC.     - Reason for MAC: straight local not clinically adequate, immobility needed   Induction: Intravenous.   Maintenance: TIVA.        Consents    Anesthesia Plan(s) and associated risks, benefits, and realistic alternatives  discussed. Questions answered and patient/representative(s) expressed understanding.    - Discussed:     - Discussed with:  Patient      - Extended Intubation/Ventilatory Support Discussed: No.      - Patient is DNR/DNI Status: No    Use of blood products discussed: No .     Postoperative Care    Pain management: IV analgesics.   PONV prophylaxis: Background Propofol Infusion     Comments:                Tyler Dominguez MD

## 2023-01-16 NOTE — DISCHARGE INSTRUCTIONS
"[facility]  Home Care Following Anesthesia  For 24 hours after surgery:  Get plenty of rest.  A responsible adult must stay with you for at least 24 hours after you leave the surgery center.  Do not drive or use heavy equipment.  If you have weakness or tingling, don't drive or use heavy equipment until this feeling goes away.   Do not drink alcohol.   Avoid strenuous or risky activities.  Ask for help when climbing stairs.  You may feel lightheaded.  IF so, sit for a few minutes before standing.  Have someone help you get up.   If you have nausea (feel sick to your stomach): Drink only clear liquids such as apple juice, ginger ale, broth or 7-Up.  Rest may also help.  Be sure to drink enough fluids.  Move to a regular diet as you feel able.   You may have a slight fever.  Call the doctor if your fever is over 100 F (37.7 C) (taken under the tongue) or lasts longer than 24 hours.  You may have a dry mouth, a sore throat, muscle aches or trouble sleeping. These should go away after 24 hours.  Do not make important or legal decisions.   It is recommended to avoid smoking.        Today you received a Marcaine or bupivacaine block to numb the nerves near your surgery site.  This is a block using local anesthetic or \"numbing\" medication injected around the nerves to anesthetize or \"numb\" the area supplied by those nerves.  This block is injected into the muscle layer near your surgical site.  The medication may numb the location where you had surgery for 6-18 hours, but may last up to 24 hours.  If your surgical site is an arm or leg you should be careful with your affected limb, since it is possible to injure your limb without being aware of it due to the numbing.  Until full feeling returns, you should guard against bumping or hitting your limb, and avoid extreme hot or cold temperatures on the skin.  As the block wears off, the feeling will return as a tingling or prickly " sensation near your surgical site.  You will experience more discomfort from your incision as the feeling returns.  You may want to take a pain pill (a narcotic or Tylenol if this was prescribed by your surgeon) when you start to experience mild pain before the pain beccomes more severe.  If your pain medications do not control your pain you should notifiy your surgeon.    Tips for taking pain medications  To get the best pain relief possible, remember these points:  Take pain medications as directed, before pain becomes severe.  Pain medication can upset your stomach: taking it with food may help.  Constipation is a common side effect of pain medication. Drink plenty of  fluids.  Eat foods high in fiber. Take a stool softener if recommended by your doctor or pharmacist.  Do not drink alcohol, drive or operate machinery while taking pain medications.  Ask about other ways to control pain, such as with heat, ice or relaxation.    Tylenol/Acetaminophen Consumption  To help encourage the safe use of acetaminophen, the makers of TYLENOL  have lowered the maximum daily dose for single-ingredient Extra Strength TYLENOL  (acetaminophen) products sold in the [place] from 8 pills per day (4,000 mg) to 6 pills per day (3,000 mg). The dosing interval has also changed from 2 pills every 4-6 hours to 2 pills every 6 hours.  If you feel your pain relief is insufficient, you may take Tylenol/Acetaminophen in addition to your narcotic pain medication.   Be careful not to exceed 3,000 mg of Tylenol/Acetaminophen in a 24 hour period from all sources.  If you are taking extra strength Tylenol/acetaminophen (500 mg), the maximum dose is 6 tablets in 24 hours.  If you are taking regular strength acetaminophen (325 mg), the maximum dose is 9 tablets in 24 hours.    Call a doctor for any of the following:  Signs of infection (fever, growing tenderness at the surgery site, a large amount of drainage or bleeding, severe pain, foul-smelling  drainage, redness, swelling).  It has been over 8 to 10 hours since surgery and you are still not able to urinate (pass water).  Headache for over 24 hours.  Numbness, tingling or weakness the day after surgery (if you had spinal anesthesia).  Signs of Covid-19 infection (temperature over 100 degrees, shortness of breath, cough, loss of taste/smell, generalized body aches, persistent headache, chills, sore throat, nausea/vomiting/diarrhea)  Your doctor is:  Dr. Ayo Mittal, General Surgery: [phone]                    Or dial [phone] and ask for the resident on call for:  General Surgery  For emergency care, call the:  [facility]:  [phone] (TTY for hearing impaired: [phone])

## 2023-01-16 NOTE — BRIEF OP NOTE
[facility] Brief Operative Note    Pre-operative diagnosis: Left inguinal hernia [K40.90]   Post-operative diagnosis Same as Pre-op Dx   Procedure: Procedure(s):  HERNIORRHAPHY, LEFT INGUINAL, OPEN   Surgeon: Ayo Mittal MD   Assistants(s):    Estimated blood loss: 1 mL    Specimens: n   Findings: yes

## 2023-01-16 NOTE — OR NURSING
Notified Dr. Mittal that the patient is on warfarin, last dose Tuesday 1/10. OK to proceed. Dr. Mittal informed patient that he can resume warfarin starting tomorrow, 1/17.

## 2023-01-16 NOTE — ANESTHESIA CARE TRANSFER NOTE
Patient: Jimi Moreno    Procedure: Procedure(s):  HERNIORRHAPHY, LEFT INGUINAL, OPEN       Diagnosis: Left inguinal hernia [K40.90]  Diagnosis Additional Information: No value filed.    Anesthesia Type:   MAC     Note:    Oropharynx: spontaneously breathing and oropharynx clear of all foreign objects  Level of Consciousness: awake  Oxygen Supplementation: room air    Independent Airway: airway patency satisfactory and stable  Dentition: dentition unchanged  Vital Signs Stable: post-procedure vital signs reviewed and stable  Report to RN Given: handoff report given  Patient transferred to: Phase II  Comments: coughing  Handoff Report: Identifed the Patient, Identified the Reponsible Provider, Reviewed the pertinent medical history, Discussed the surgical course, Reviewed Intra-OP anesthesia mangement and issues during anesthesia, Set expectations for post-procedure period and Allowed opportunity for questions and acknowledgement of understanding      Vitals:  Vitals Value Taken Time   BP 96/64    Temp 97.78    Pulse 97    Resp     SpO2 97        Electronically Signed By: PANCHO Samuels CRNA  January 16, 2023  8:18 AM

## 2023-01-16 NOTE — ANESTHESIA POSTPROCEDURE EVALUATION
Patient: Jimi Moreno    Procedure: Procedure(s):  HERNIORRHAPHY, LEFT INGUINAL, OPEN       Anesthesia Type:  MAC    Note:  Disposition: Outpatient   Postop Pain Control: Uneventful            Sign Out: Well controlled pain   PONV: No   Neuro/Psych: Uneventful            Sign Out: Acceptable/Baseline neuro status   Airway/Respiratory: Uneventful            Sign Out: Acceptable/Baseline resp. status   CV/Hemodynamics: Uneventful            Sign Out: Acceptable CV status; No obvious hypovolemia; No obvious fluid overload   Other NRE: NONE   DID A NON-ROUTINE EVENT OCCUR? No           Last vitals:  Vitals Value Taken Time   /82 01/16/23 0830   Temp 36.6  C (97.9  F) 01/16/23 0830   Pulse 90 01/16/23 0830   Resp 16 01/16/23 0830   SpO2 94 % 01/16/23 0830       Electronically Signed By: Tyler Dominguez MD  January 16, 2023  9:35 AM

## 2023-01-16 NOTE — OP NOTE
Procedure Date: 01/16/2023    PREOPERATIVE DIAGNOSIS:  Left inguinal hernia.    POSTOPERATIVE DIAGNOSIS:  Left inguinal hernia.    OPERATIVE PROCEDURE:  Open mesh repair of left inguinal hernia.    SURGEON:  Ayo Mittal MD    ANESTHESIA:  IV sedation plus local infiltration of Marcaine and Zynrelef.    INDICATIONS FOR PROCEDURE:  The patient presents with left inguinal hernia.  Informed consent was obtained.    OPERATIVE FINDINGS:  Left inguinal indirect hernia with associated cord lipoma.  Refer to below.    DESCRIPTION OF PROCEDURE:  The patient was brought to the operating room and put under IV sedation.  Left inguinal region widely prepped and draped in the usual sterile fashion.  Injected with Marcaine throughout for adequate anesthetic.  Skin incision was made along the inguinal crease.  Dissection carried down to external aponeurosis, which was opened to the external ring.  The cord was then isolated with a Penrose and a fair-sized cord lipoma was resected.  The cord also was a rather fatty in nature and I was, however, able to identify the indirect sac, which was dissected widely away from the cord and then reduced into the preperitoneal space using a pursestring of 3-0 Vicryl.  Once this was completed, initiated my routine mesh Christiano repair using a 15 x 10 cm Parietex ProGrip mesh, which was split laterally, passing around the cord.  The tails were then crossed and passed out laterally between internal and external aponeurosis.  The mesh was extended to the midline over rectus fascia and was secured out laterally with an interrupted Vicryl.  The newly created deep ring was then reinforced by taking inferior edges of the tails and tacking this down to the inguinal ligament with a Vicryl suture as well.  The ilioinguinal nerve was identified as well away of our dissection.  The external aponeurosis was then closed with a running Vicryl stitch.  Zynrelef was then placed in the canal per routine on  both sides of the external aponeurosis.  I then closed in layers with Vicryl, Monocryl in skin, Steri-Strips were applied.  Estimated blood loss was minimal.  The patient tolerated the procedure well and was taken to recovery room where he was without difficulty or apparent complication.    Ayo Mittal MD        D: 2023   T: 2023   MT: IESHA    Name:     CANDELARIO ABRAJASRaiza  MRN:      [id number]        Account:        [id number]   :      1945           Procedure Date: 2023     Document: [id number]

## 2023-01-18 ENCOUNTER — PATIENT OUTREACH (OUTPATIENT)
Dept: SURGERY | Facility: CLINIC | Age: 78
End: 2023-01-18
Payer: COMMERCIAL

## 2023-01-18 NOTE — PROGRESS NOTES
RN Post-Op/Post-Discharge Care Coordination Note    Mr. Jimi Moreno is a 78 year old male who underwent open inguinal hernia repair on 1/16 with  Dr. Ayo Mittal.  Spoke with Patient.    Support  Patient able to care for self independently     Health Status  Nausea/Vomiting: Patient denies nausea/vomiting.  Eating/drinking: Patient is able to eat and drink without any complaints.  Bowel habits: Patient reports having a normal bowel movement.  Drains (ALISHA): N/A  Fevers/chills: Patient denies any fever or chills.  Incisions: Patient denies any signs and symptoms of infection..  Wound closure:  Steri-strips  Pain: Uncomfortable yesterday and today and should improve over the next couple of days. He has not needed to take Norco and is taking Tylenol PRN per package instructions.  Wearing athletic supporter as well and recommended for at least 72 hours. May apply local heat PRN protecting his skin from injury.  New Medications:  Norco, Senna    Activity/Restrictions  No lifting in excess of 15-20 pounds for 3-4 weeks    Equipment  Athletic Supporter    Pathology reviewed with patient:  N/A    Forms/Letters  No    All of his questions were answered including reviewing restrictions and wound care.  He will call this office if he has any further questions and/or concerns.      In lieu of a post-op clinic patient that patient would like to be contacted in approximately 7-10 days via telephone.    A copy of this note was routed to the primary surgeon.      Whom and When to Call  Patient acknowledges understanding of how to manage any medication changes and   when to seek medical care.     Patient advised that if after hour medical concerns arise to please call [phone] and choose option 4 to speak to the physician on call.

## 2023-01-23 ENCOUNTER — LAB (OUTPATIENT)
Dept: LAB | Facility: CLINIC | Age: 78
End: 2023-01-23
Payer: COMMERCIAL

## 2023-01-23 ENCOUNTER — ANTICOAGULATION THERAPY VISIT (OUTPATIENT)
Dept: ANTICOAGULATION | Facility: CLINIC | Age: 78
End: 2023-01-23

## 2023-01-23 DIAGNOSIS — Z11.59 NEED FOR HEPATITIS C SCREENING TEST: ICD-10-CM

## 2023-01-23 DIAGNOSIS — I48.91 ATRIAL FIBRILLATION WITH RVR (H): Primary | ICD-10-CM

## 2023-01-23 DIAGNOSIS — Z13.220 SCREENING FOR HYPERLIPIDEMIA: ICD-10-CM

## 2023-01-23 DIAGNOSIS — I48.91 ATRIAL FIBRILLATION WITH RVR (H): ICD-10-CM

## 2023-01-23 DIAGNOSIS — I10 ESSENTIAL HYPERTENSION WITH GOAL BLOOD PRESSURE LESS THAN 140/90: ICD-10-CM

## 2023-01-23 LAB
ALT SERPL W P-5'-P-CCNC: 21 U/L (ref 10–50)
CHOLEST SERPL-MCNC: 153 MG/DL
HCV AB SERPL QL IA: NONREACTIVE
HDLC SERPL-MCNC: 43 MG/DL
INR BLD: 1.7 (ref 0.9–1.1)
LDLC SERPL CALC-MCNC: 87 MG/DL
NONHDLC SERPL-MCNC: 110 MG/DL
TRIGL SERPL-MCNC: 117 MG/DL

## 2023-01-23 PROCEDURE — 84460 ALANINE AMINO (ALT) (SGPT): CPT

## 2023-01-23 PROCEDURE — 86803 HEPATITIS C AB TEST: CPT

## 2023-01-23 PROCEDURE — 36415 COLL VENOUS BLD VENIPUNCTURE: CPT

## 2023-01-23 PROCEDURE — 80061 LIPID PANEL: CPT

## 2023-01-23 PROCEDURE — 85610 PROTHROMBIN TIME: CPT

## 2023-01-23 NOTE — PROGRESS NOTES
ANTICOAGULATION MANAGEMENT     Jimi Moreno 78 year old male is on warfarin with subtherapeutic INR result. (Goal INR 2.0-3.0)    Recent labs: (last 7 days)     01/23/23  0819   INR 1.7*       ASSESSMENT       Source(s): Chart Review and Patient/Caregiver Call       Warfarin doses taken: Held for hernia repair  recently which may be affecting INR    Diet: No new diet changes identified    New illness, injury, or hospitalization: No    Medication/supplement changes: None noted    Signs or symptoms of bleeding or clotting: No    Previous INR: Therapeutic last 2(+) visits    Additional findings: None       PLAN     Recommended plan for temporary change(s) affecting INR     Dosing Instructions: booster dose then continue your current warfarin dose with next INR in 2 weeks       Summary  As of 1/23/2023    Full warfarin instructions:  1/23: 10 mg; Otherwise 7.5 mg every Mon, Wed, Fri; 5 mg all other days   Next INR check:  2/6/2023             Telephone call with Jimi who verbalizes understanding and agrees to plan    Lab visit scheduled    Education provided:     Goal range and lab monitoring: goal range and significance of current result    Contact [phone]  with any changes, questions or concerns.     Plan made per [facility] anticoagulation protocol    Loulou Reardon, RN  Anticoagulation Clinic  1/23/2023    _______________________________________________________________________     Anticoagulation Episode Summary     Current INR goal:  2.0-3.0   TTR:  96.4 % (1 y)   Target end date:  Indefinite   Send INR reminders to:  [facility]    Indications    Atrial fibrillation with RVR (H) [I48.91]           Comments:           Anticoagulation Care Providers     Provider Role Specialty Phone number    Francia Finnegan NP Referring Nurse Practitioner - Family [phone]    Nikki Alarcon APRN CNP Referring Nurse Practitioner - Adult Health [phone]    Hannah Knight MD Referring  Family Medicine [phone]

## 2023-01-25 ENCOUNTER — PATIENT OUTREACH (OUTPATIENT)
Dept: SURGERY | Facility: CLINIC | Age: 78
End: 2023-01-25
Payer: COMMERCIAL

## 2023-01-25 NOTE — PROGRESS NOTES
01/25/23    10:54 AM      Jimi Moreno is a patient of Dr. Ayo Mittal that recently underwent a surgical procedure (open LIH).  The patient was contacted via telephone approximately 7-10 days ago for a status update and post-op teaching.  In lieu of a clinic visit, the patient requested to be contacted at a later date by an RN for an assessment.    Attempted to contact patient via telephone for a status update.  No answer and no VM.  Will try at a later time.    01/26/23    10:31 AM     Jimi Moreno was contacted several days post procedure via telephone for a status update and elected to have a telephone follow -up call approximately 7-10 days after original contact in lieu of a clinic visit with Dr. Ayo Mittal.  He continues to do well and the steri-strips  have not peeled off- can remove Sunday, if he prefers.  The patients wounds are healed and the area is C/D/I.  He is afebrile, pain free, and ana po; the patient is slowly resuming his normal activity.   Discussed restrictions including no lifting in excess of 15-20 pounds for 3 weeks.    Pathology was reviewed with the patient: N/A    All of Jimi Moreno question's were answered and  he would like to return to the clinic on a PRN basis.  The patient is aware that  he may schedule a post op appointment at any time.    A copy of this note was routed to the patients surgeon.

## 2023-02-06 ENCOUNTER — LAB (OUTPATIENT)
Dept: LAB | Facility: CLINIC | Age: 78
End: 2023-02-06
Payer: COMMERCIAL

## 2023-02-06 ENCOUNTER — ANTICOAGULATION THERAPY VISIT (OUTPATIENT)
Dept: ANTICOAGULATION | Facility: CLINIC | Age: 78
End: 2023-02-06

## 2023-02-06 DIAGNOSIS — I48.91 ATRIAL FIBRILLATION WITH RVR (H): Primary | ICD-10-CM

## 2023-02-06 DIAGNOSIS — I48.91 ATRIAL FIBRILLATION WITH RVR (H): ICD-10-CM

## 2023-02-06 LAB — INR BLD: 2.7 (ref 0.9–1.1)

## 2023-02-06 PROCEDURE — 85610 PROTHROMBIN TIME: CPT

## 2023-02-06 PROCEDURE — 36416 COLLJ CAPILLARY BLOOD SPEC: CPT

## 2023-02-06 NOTE — PROGRESS NOTES
ANTICOAGULATION MANAGEMENT     Jimi Moreno 78 year old male is on warfarin with therapeutic INR result. (Goal INR 2.0-3.0)    Recent labs: (last 7 days)     02/06/23  0822   INR 2.7*       ASSESSMENT       Source(s): Chart Review and Patient/Caregiver Call       Warfarin doses taken: Warfarin taken as instructed    Diet: No new diet changes identified    New illness, injury, or hospitalization: No    Medication/supplement changes: None noted    Signs or symptoms of bleeding or clotting: No    Previous INR: Therapeutic last 2(+) visits    Additional findings: None       PLAN     Recommended plan for no diet, medication or health factor changes affecting INR     Dosing Instructions: Continue your current warfarin dose with next INR in 4 weeks       Summary  As of 2/6/2023    Full warfarin instructions:  7.5 mg every Mon, Wed, Fri; 5 mg all other days   Next INR check:  3/6/2023             Telephone call with Jimi who verbalizes understanding and agrees to plan    Lab visit scheduled    Education provided:     Please call back if any changes to your diet, medications or how you've been taking warfarin    Plan made per [facility] anticoagulation protocol    Jyotsna Alcantara, RN  Anticoagulation Clinic  2/6/2023    _______________________________________________________________________     Anticoagulation Episode Summary     Current INR goal:  2.0-3.0   TTR:  95.3 % (1 y)   Target end date:  Indefinite   Send INR reminders to:  [facility]    Indications    Atrial fibrillation with RVR (H) [I48.91]           Comments:           Anticoagulation Care Providers     Provider Role Specialty Phone number    Francia Finnegan NP Referring Nurse Practitioner - Family [phone]    Nikki Alarcon APRN CNP Referring Nurse Practitioner - Replaced by [facility] [phone]    Hannah Knight MD Referring Family Medicine [phone]

## 2023-02-20 ENCOUNTER — TELEPHONE (OUTPATIENT)
Dept: FAMILY MEDICINE | Facility: CLINIC | Age: 78
End: 2023-02-20
Payer: COMMERCIAL

## 2023-02-20 DIAGNOSIS — I48.91 ATRIAL FIBRILLATION WITH RVR (H): ICD-10-CM

## 2023-02-20 RX ORDER — WARFARIN SODIUM 5 MG/1
TABLET ORAL
Qty: 120 TABLET | Refills: 1 | Status: SHIPPED | OUTPATIENT
Start: 2023-02-20 | End: 2023-08-17

## 2023-02-20 NOTE — TELEPHONE ENCOUNTER
ANTICOAGULATION MANAGEMENT:  Medication Refill    Anticoagulation Summary  As of 2/6/2023    Warfarin maintenance plan:  7.5 mg (5 mg x 1.5) every Mon, Wed, Fri; 5 mg (5 mg x 1) all other days   Next INR check:  3/6/2023   Target end date:  Indefinite    Indications    Atrial fibrillation with RVR (H) [I48.91]             Anticoagulation Care Providers     Provider Role Specialty Phone number    Francia Finnegan NP Referring Nurse Practitioner - Family [phone]    Nikki Alarcon APRN CNP Referring Nurse Practitioner - [facility] [phone]    Hannah Knight MD Referring Family Medicine [phone]          Visit with referring provider/group within last year: Yes    [facility] referral signed within last year: Yes    Jimi meets all criteria for refill (current [facility] referral, office visit with referring provider/group in last year, lab monitoring up to date or not exceeding 2 weeks overdue). Rx instructions and quantity match patient's current dosing plan. Warfarin 90 day supply with 1 refill granted per [facility] protocol     Loulou Reardon, RN  Anticoagulation Clinic

## 2023-03-10 ENCOUNTER — LAB (OUTPATIENT)
Dept: LAB | Facility: CLINIC | Age: 78
End: 2023-03-10
Payer: COMMERCIAL

## 2023-03-10 ENCOUNTER — ANTICOAGULATION THERAPY VISIT (OUTPATIENT)
Dept: ANTICOAGULATION | Facility: CLINIC | Age: 78
End: 2023-03-10

## 2023-03-10 DIAGNOSIS — I48.91 ATRIAL FIBRILLATION WITH RVR (H): Primary | ICD-10-CM

## 2023-03-10 DIAGNOSIS — I48.91 ATRIAL FIBRILLATION WITH RVR (H): ICD-10-CM

## 2023-03-10 LAB — INR BLD: 2.6 (ref 0.9–1.1)

## 2023-03-10 PROCEDURE — 36416 COLLJ CAPILLARY BLOOD SPEC: CPT

## 2023-03-10 PROCEDURE — 85610 PROTHROMBIN TIME: CPT

## 2023-03-10 NOTE — PROGRESS NOTES
ANTICOAGULATION MANAGEMENT     Jimi Moreno 78 year old male is on warfarin with therapeutic INR result. (Goal INR 2.0-3.0)    Recent labs: (last 7 days)     03/10/23  0913   INR 2.6*       ASSESSMENT     Warfarin Lab Questionnaire    Dose in Tablet or Mg 3/10/2023   TAB or MG? tablet (tab)     Pt Rptd Dose KATHY MONDAY TUESDAY WEDNESDAY THURSDAY FRIDAY SATURDAY   3/10/2023 1 1.5 1 1.5 1 1.5 1     Warfarin Lab Questionnaire 3/10/2023   Missed doses? No   Medication changes? No   Abnormal bleeding? No   Shortness of breath? No   Injuries or illness since last INR? No   Changes in diet or alcohol? No   Upcoming surgery, procedure? No       Additional findings: Last INR thera, previous sub       PLAN     Recommended plan for no diet, medication or health factor changes affecting INR     Dosing Instructions: Continue your current warfarin dose with next INR in 5-6 weeks       Summary  As of 3/10/2023    Full warfarin instructions:  7.5 mg every Mon, Wed, Fri; 5 mg all other days   Next INR check:  4/14/2023             Telephone call with Jimi who verbalizes understanding and agrees to plan    Lab visit scheduled    Education provided:     Goal range and lab monitoring: goal range and significance of current result    Contact [phone]  with any changes, questions or concerns.     Plan made per [facility] anticoagulation protocol    Mesha Keith RN  Anticoagulation Clinic  3/10/2023    _______________________________________________________________________     Anticoagulation Episode Summary     Current INR goal:  2.0-3.0   TTR:  95.3 % (1 y)   Target end date:  Indefinite   Send INR reminders to:  [facility]    Indications    Atrial fibrillation with RVR (H) [I48.91]           Comments:           Anticoagulation Care Providers     Provider Role Specialty Phone number    Francia Finnegan NP Referring Nurse Practitioner - Family [phone]    Nikki Alarcon APRN CNP Referring Nurse  Practitioner - Adult Health [phone]    Hannah Knight MD Referring Family Medicine [phone]

## 2023-04-15 DIAGNOSIS — I48.20 CHRONIC ATRIAL FIBRILLATION (H): ICD-10-CM

## 2023-04-17 RX ORDER — DILTIAZEM HYDROCHLORIDE 360 MG/1
360 CAPSULE, EXTENDED RELEASE ORAL DAILY
Qty: 90 CAPSULE | Refills: 0 | Status: SHIPPED | OUTPATIENT
Start: 2023-04-17 | End: 2023-07-10

## 2023-04-20 ENCOUNTER — PATIENT OUTREACH (OUTPATIENT)
Dept: CARE COORDINATION | Facility: CLINIC | Age: 78
End: 2023-04-20
Payer: COMMERCIAL

## 2023-04-21 ENCOUNTER — LAB (OUTPATIENT)
Dept: LAB | Facility: CLINIC | Age: 78
End: 2023-04-21
Payer: COMMERCIAL

## 2023-04-21 ENCOUNTER — ANTICOAGULATION THERAPY VISIT (OUTPATIENT)
Dept: ANTICOAGULATION | Facility: CLINIC | Age: 78
End: 2023-04-21

## 2023-04-21 DIAGNOSIS — I48.91 ATRIAL FIBRILLATION WITH RVR (H): Primary | ICD-10-CM

## 2023-04-21 DIAGNOSIS — I48.91 ATRIAL FIBRILLATION WITH RVR (H): ICD-10-CM

## 2023-04-21 LAB — INR BLD: 2 (ref 0.9–1.1)

## 2023-04-21 PROCEDURE — 36415 COLL VENOUS BLD VENIPUNCTURE: CPT

## 2023-04-21 PROCEDURE — 85610 PROTHROMBIN TIME: CPT

## 2023-04-21 NOTE — PROGRESS NOTES
ANTICOAGULATION MANAGEMENT     Jimi Moreno 78 year old male is on warfarin with therapeutic INR result. (Goal INR 2.0-3.0)    Recent labs: (last 7 days)     04/21/23  0816   INR 2.0*       ASSESSMENT       Source(s): Chart Review and Patient/Caregiver Call       Warfarin doses taken: Warfarin taken as instructed    Diet: No new diet changes identified    Medication/supplement changes: None noted    New illness, injury, or hospitalization: No    Signs or symptoms of bleeding or clotting: No    Previous INR: Therapeutic last 2(+) visits    Additional findings: None         PLAN     Recommended plan for no diet, medication or health factor changes affecting INR     Dosing Instructions: Continue your current warfarin dose with next INR in 6 weeks       Summary  As of 4/21/2023    Full warfarin instructions:  7.5 mg every Mon, Wed, Fri; 5 mg all other days   Next INR check:  6/2/2023             Telephone call with Jimi who verbalizes understanding and agrees to plan and who agrees to plan and repeated back plan correctly    Lab visit scheduled    Education provided:     Please call back if any changes to your diet, medications or how you've been taking warfarin    Plan made per [facility] anticoagulation protocol    Leticia Patel, RN  Anticoagulation Clinic  4/21/2023    _______________________________________________________________________     Anticoagulation Episode Summary     Current INR goal:  2.0-3.0   TTR:  95.3 % (1 y)   Target end date:  Indefinite   Send INR reminders to:  [facility]    Indications    Atrial fibrillation with RVR (H) [I48.91]           Comments:           Anticoagulation Care Providers     Provider Role Specialty Phone number    Francia Finnegan NP Referring Nurse Practitioner - Family [phone]    Nikki Alarcon APRN CNP Referring Nurse Practitioner - [facility] [phone]    Hannah Knight MD Referring Family Medicine [phone]

## 2023-04-23 ENCOUNTER — HEALTH MAINTENANCE LETTER (OUTPATIENT)
Age: 78
End: 2023-04-23

## 2023-06-02 ENCOUNTER — LAB (OUTPATIENT)
Dept: LAB | Facility: CLINIC | Age: 78
End: 2023-06-02
Payer: COMMERCIAL

## 2023-06-02 ENCOUNTER — ANTICOAGULATION THERAPY VISIT (OUTPATIENT)
Dept: ANTICOAGULATION | Facility: CLINIC | Age: 78
End: 2023-06-02

## 2023-06-02 ENCOUNTER — HEALTH MAINTENANCE LETTER (OUTPATIENT)
Age: 78
End: 2023-06-02

## 2023-06-02 DIAGNOSIS — I48.91 ATRIAL FIBRILLATION WITH RVR (H): Primary | ICD-10-CM

## 2023-06-02 DIAGNOSIS — I48.91 ATRIAL FIBRILLATION WITH RVR (H): ICD-10-CM

## 2023-06-02 LAB — INR BLD: 2.3 (ref 0.9–1.1)

## 2023-06-02 PROCEDURE — 85610 PROTHROMBIN TIME: CPT

## 2023-06-02 PROCEDURE — 36416 COLLJ CAPILLARY BLOOD SPEC: CPT

## 2023-06-02 NOTE — PROGRESS NOTES
ANTICOAGULATION MANAGEMENT     Jimi Moreno 78 year old male is on warfarin with therapeutic INR result. (Goal INR 2.0-3.0)    Recent labs: (last 7 days)     06/02/23  0812   INR 2.3*       ASSESSMENT       Source(s): Chart Review and Patient/Caregiver Call       Warfarin doses taken: Warfarin taken as instructed    Diet: No new diet changes identified    Medication/supplement changes: None noted    New illness, injury, or hospitalization: No    Signs or symptoms of bleeding or clotting: No    Previous result: Therapeutic last 2(+) visits    Additional findings: None         PLAN     Recommended plan for no diet, medication or health factor changes affecting INR     Dosing Instructions: Continue your current warfarin dose with next INR in 6 weeks       Summary  As of 6/2/2023    Full warfarin instructions:  7.5 mg every Mon, Wed, Fri; 5 mg all other days   Next INR check:  7/14/2023             Telephone call with Jimi who verbalizes understanding and agrees to plan and who agrees to plan and repeated back plan correctly    Lab visit scheduled    Education provided:     Please call back if any changes to your diet, medications or how you've been taking warfarin    Plan made per [facility] anticoagulation protocol    Leticia Patel, RN  Anticoagulation Clinic  6/2/2023    _______________________________________________________________________     Anticoagulation Episode Summary     Current INR goal:  2.0-3.0   TTR:  95.3 % (1 y)   Target end date:  Indefinite   Send INR reminders to:  [facility]    Indications    Atrial fibrillation with RVR (H) [I48.91]           Comments:           Anticoagulation Care Providers     Provider Role Specialty Phone number    Francia Finnegan NP Referring Nurse Practitioner - Family [phone]    Nikki Alarcon APRN CNP Referring Nurse Practitioner - [facility] [phone]    Hannah Knight MD Referring Family Medicine [phone]

## 2023-07-08 DIAGNOSIS — I48.20 CHRONIC ATRIAL FIBRILLATION (H): ICD-10-CM

## 2023-07-10 RX ORDER — DILTIAZEM HYDROCHLORIDE 360 MG/1
CAPSULE, EXTENDED RELEASE ORAL
Qty: 90 CAPSULE | Refills: 0 | Status: SHIPPED | OUTPATIENT
Start: 2023-07-10 | End: 2023-09-11

## 2023-07-14 ENCOUNTER — ANTICOAGULATION THERAPY VISIT (OUTPATIENT)
Dept: ANTICOAGULATION | Facility: CLINIC | Age: 78
End: 2023-07-14

## 2023-07-14 ENCOUNTER — LAB (OUTPATIENT)
Dept: LAB | Facility: CLINIC | Age: 78
End: 2023-07-14
Payer: COMMERCIAL

## 2023-07-14 DIAGNOSIS — I48.91 ATRIAL FIBRILLATION WITH RVR (H): ICD-10-CM

## 2023-07-14 DIAGNOSIS — I10 ESSENTIAL HYPERTENSION WITH GOAL BLOOD PRESSURE LESS THAN 140/90: ICD-10-CM

## 2023-07-14 DIAGNOSIS — I48.91 ATRIAL FIBRILLATION WITH RVR (H): Primary | ICD-10-CM

## 2023-07-14 LAB
ANION GAP SERPL CALCULATED.3IONS-SCNC: 12 MMOL/L (ref 7–15)
BUN SERPL-MCNC: 12.3 MG/DL (ref 8–23)
CALCIUM SERPL-MCNC: 8.8 MG/DL (ref 8.8–10.2)
CHLORIDE SERPL-SCNC: 107 MMOL/L (ref 98–107)
CREAT SERPL-MCNC: 0.91 MG/DL (ref 0.67–1.17)
DEPRECATED HCO3 PLAS-SCNC: 22 MMOL/L (ref 22–29)
ERYTHROCYTE [DISTWIDTH] IN BLOOD BY AUTOMATED COUNT: 13.1 % (ref 10–15)
GFR SERPL CREATININE-BSD FRML MDRD: 85 ML/MIN/1.73M2
GLUCOSE SERPL-MCNC: 100 MG/DL (ref 70–99)
HCT VFR BLD AUTO: 42.9 % (ref 40–53)
HGB BLD-MCNC: 14.8 G/DL (ref 13.3–17.7)
INR BLD: 6.2 (ref 0.9–1.1)
MCH RBC QN AUTO: 32 PG (ref 26.5–33)
MCHC RBC AUTO-ENTMCNC: 34.5 G/DL (ref 31.5–36.5)
MCV RBC AUTO: 93 FL (ref 78–100)
PLATELET # BLD AUTO: 143 10E3/UL (ref 150–450)
POTASSIUM SERPL-SCNC: 3.7 MMOL/L (ref 3.4–5.3)
RBC # BLD AUTO: 4.63 10E6/UL (ref 4.4–5.9)
SODIUM SERPL-SCNC: 141 MMOL/L (ref 136–145)
WBC # BLD AUTO: 4.9 10E3/UL (ref 4–11)

## 2023-07-14 PROCEDURE — 85610 PROTHROMBIN TIME: CPT

## 2023-07-14 PROCEDURE — 80048 BASIC METABOLIC PNL TOTAL CA: CPT

## 2023-07-14 PROCEDURE — 85027 COMPLETE CBC AUTOMATED: CPT

## 2023-07-14 PROCEDURE — 36415 COLL VENOUS BLD VENIPUNCTURE: CPT

## 2023-07-14 NOTE — PROGRESS NOTES
ANTICOAGULATION MANAGEMENT     Jimi Moreno 78 year old male is on warfarin with supratherapeutic INR result. (Goal INR 2.0-3.0)    Recent labs: (last 7 days)     07/14/23  0823   INR 6.2*       ASSESSMENT       Source(s): Chart Review and Patient/Caregiver Call       Warfarin doses taken: Warfarin taken as instructed    Diet: Change in alcohol intake may be affecting INR. Patient drank one less beer then average.     Medication/supplement changes: None noted    New illness, injury, or hospitalization: No    Signs or symptoms of bleeding or clotting: No    Previous result: Therapeutic last 2(+) visits    Additional findings: None         PLAN     Recommended plan for no diet, medication or health factor changes affecting INR     Dosing Instructions: hold 2 doses then decrease your warfarin dose (11.8% change) with next INR in 3 days       Summary  As of 7/14/2023    Full warfarin instructions:  7/14: Hold; 7/15: Hold; Otherwise 7.5 mg every Wed; 5 mg all other days   Next INR check:  7/17/2023             Telephone call with Jimi who verbalizes understanding and agrees to plan    Lab visit scheduled    Education provided:     Please call back if any changes to your diet, medications or how you've been taking warfarin    Symptom monitoring: monitoring for bleeding signs and symptoms and when to seek medical attention/emergency care    Contact [phone]  with any changes, questions or concerns.     Plan made per [facility] anticoagulation protocol    Jayjay VALERO RN  Anticoagulation Clinic  7/14/2023    _______________________________________________________________________     Anticoagulation Episode Summary     Current INR goal:  2.0-3.0   TTR:  85.9 % (1 y)   Target end date:  Indefinite   Send INR reminders to:  [facility]    Indications    Atrial fibrillation with RVR (H) [I48.91]           Comments:           Anticoagulation Care Providers     Provider Role Specialty Phone number    Francia Finnegan, [place]  Referring Nurse Practitioner - Family [phone]    Nikki Alarcon APRN CNP Referring Nurse Practitioner - [facility] [phone]    Hannah Knight MD Referring [facility] [phone]

## 2023-07-17 ENCOUNTER — LAB (OUTPATIENT)
Dept: LAB | Facility: CLINIC | Age: 78
End: 2023-07-17
Payer: COMMERCIAL

## 2023-07-17 ENCOUNTER — ANTICOAGULATION THERAPY VISIT (OUTPATIENT)
Dept: ANTICOAGULATION | Facility: CLINIC | Age: 78
End: 2023-07-17

## 2023-07-17 DIAGNOSIS — I48.91 ATRIAL FIBRILLATION WITH RVR (H): ICD-10-CM

## 2023-07-17 DIAGNOSIS — I48.91 ATRIAL FIBRILLATION WITH RVR (H): Primary | ICD-10-CM

## 2023-07-17 LAB — INR BLD: 1.8 (ref 0.9–1.1)

## 2023-07-17 PROCEDURE — 85610 PROTHROMBIN TIME: CPT

## 2023-07-17 PROCEDURE — 36415 COLL VENOUS BLD VENIPUNCTURE: CPT

## 2023-07-17 NOTE — PROGRESS NOTES
ANTICOAGULATION MANAGEMENT     Jimi Moreno 78 year old male is on warfarin with subtherapeutic INR result. (Goal INR 2.0-3.0)    Recent labs: (last 7 days)     07/17/23  1112   INR 1.8*       ASSESSMENT     Source(s): Chart Review and Patient/Caregiver Call     Warfarin doses taken: Missed dose(s) may be affecting INR and Held for Elevated INR  recently which may be affecting INR  Diet: No new diet changes identified  Medication/supplement changes: None noted  New illness, injury, or hospitalization: No  Signs or symptoms of bleeding or clotting: No  Previous result: Supratherapeutic  Additional findings: None       PLAN     Recommended plan for temporary change(s) affecting INR     Dosing Instructions: Continue your current warfarin dose with next INR in 4 days       Summary  As of 7/17/2023      Full warfarin instructions:  7.5 mg every Mon; 5 mg all other days   Next INR check:  7/21/2023               Telephone call with Jimi who verbalizes understanding and agrees to plan    Lab visit scheduled    Education provided:   Please call back if any changes to your diet, medications or how you've been taking warfarin  Contact [phone]  with any changes, questions or concerns.     Plan made per [facility] anticoagulation protocol    Jayjay VALERO RN  Anticoagulation Clinic  7/17/2023    _______________________________________________________________________     Anticoagulation Episode Summary       Current INR goal:  2.0-3.0   TTR:  85.2 % (1 y)   Target end date:  Indefinite   Send INR reminders to:  [facility]    Indications    Atrial fibrillation with RVR (H) [I48.91]             Comments:               Anticoagulation Care Providers       Provider Role Specialty Phone number    Francia Finnegan NP Referring Nurse Practitioner - Family [phone]    Nikki Alarcon APRN CNP Referring Nurse Practitioner - Adult Health [phone]    Hannah Knight MD Referring Family Medicine  [phone]

## 2023-07-21 ENCOUNTER — LAB (OUTPATIENT)
Dept: LAB | Facility: CLINIC | Age: 78
End: 2023-07-21
Payer: COMMERCIAL

## 2023-07-21 ENCOUNTER — ANTICOAGULATION THERAPY VISIT (OUTPATIENT)
Dept: ANTICOAGULATION | Facility: CLINIC | Age: 78
End: 2023-07-21

## 2023-07-21 DIAGNOSIS — I48.91 ATRIAL FIBRILLATION WITH RVR (H): ICD-10-CM

## 2023-07-21 DIAGNOSIS — I48.91 ATRIAL FIBRILLATION WITH RVR (H): Primary | ICD-10-CM

## 2023-07-21 LAB — INR BLD: 1.7 (ref 0.9–1.1)

## 2023-07-21 PROCEDURE — 36416 COLLJ CAPILLARY BLOOD SPEC: CPT

## 2023-07-21 PROCEDURE — 85610 PROTHROMBIN TIME: CPT

## 2023-07-21 NOTE — PROGRESS NOTES
ANTICOAGULATION MANAGEMENT     Jimi Moreno 78 year old male is on warfarin with subtherapeutic INR result. (Goal INR 2.0-3.0)    Recent labs: (last 7 days)     07/21/23  0743   INR 1.7*       ASSESSMENT       Source(s): Chart Review and Patient/Caregiver Call       Warfarin doses taken: Held for supratherapeutic INR  recently which may be affecting INR    Diet: No new diet changes identified    Medication/supplement changes: None noted    New illness, injury, or hospitalization: No    Signs or symptoms of bleeding or clotting: No    Previous result: Subtherapeutic    Additional findings: None         PLAN     Recommended plan for temporary change(s) affecting INR     Dosing Instructions: Increase your warfarin dose (13.3% change) with next INR in 4 days.        Summary  As of 7/21/2023    Full warfarin instructions:  7.5 mg every Mon, Wed, Fri; 5 mg all other days   Next INR check:  7/25/2023             Telephone call with Jimi who verbalizes understanding and agrees to plan and who agrees to plan and repeated back plan correctly    Lab visit scheduled    Education provided:     Symptom monitoring: monitoring for bleeding signs and symptoms, monitoring for clotting signs and symptoms, monitoring for stroke signs and symptoms and when to seek medical attention/emergency care    Plan made with [facility] Pharmacist Eneida Youssef, DONNA  Anticoagulation Clinic  7/21/2023    _______________________________________________________________________     Anticoagulation Episode Summary     Current INR goal:  2.0-3.0   TTR:  84.1 % (1 y)   Target end date:  Indefinite   Send INR reminders to:  [facility]    Indications    Atrial fibrillation with RVR (H) [I48.91]           Comments:           Anticoagulation Care Providers     Provider Role Specialty Phone number    Francia Finnegan NP Referring Nurse Practitioner - Family [phone]    Nikki Alarcon APRN CNP Referring Nurse  Practitioner - Adult Health [phone]    Hannah Knight MD Referring Family Medicine [phone]

## 2023-07-25 ENCOUNTER — LAB (OUTPATIENT)
Dept: LAB | Facility: CLINIC | Age: 78
End: 2023-07-25
Payer: COMMERCIAL

## 2023-07-25 ENCOUNTER — ANTICOAGULATION THERAPY VISIT (OUTPATIENT)
Dept: ANTICOAGULATION | Facility: CLINIC | Age: 78
End: 2023-07-25

## 2023-07-25 DIAGNOSIS — I48.91 ATRIAL FIBRILLATION WITH RVR (H): Primary | ICD-10-CM

## 2023-07-25 DIAGNOSIS — I48.91 ATRIAL FIBRILLATION WITH RVR (H): ICD-10-CM

## 2023-07-25 LAB — INR BLD: 1.7 (ref 0.9–1.1)

## 2023-07-25 PROCEDURE — 85610 PROTHROMBIN TIME: CPT

## 2023-07-25 PROCEDURE — 36416 COLLJ CAPILLARY BLOOD SPEC: CPT

## 2023-07-25 NOTE — PROGRESS NOTES
ANTICOAGULATION MANAGEMENT     Jimi Moreno 78 year old male is on warfarin with subtherapeutic INR result. (Goal INR 2.0-3.0)    Recent labs: (last 7 days)     07/25/23  0911   INR 1.7*       ASSESSMENT     Source(s): Chart Review and Patient/Caregiver Call     Warfarin doses taken: Less warfarin taken than planned which may be affecting INR  Diet: No new diet changes identified  Medication/supplement changes: None noted  New illness, injury, or hospitalization: No  Signs or symptoms of bleeding or clotting: No  Previous result: Subtherapeutic  Additional findings: None       PLAN     Recommended plan for temporary change(s) and ongoing change(s) affecting INR     Dosing Instructions: Increase your warfarin dose (5.9% change) with next INR in 1 week       Summary  As of 7/25/2023      Full warfarin instructions:  5 mg every Mon, Wed, Fri; 7.5 mg all other days   Next INR check:  8/1/2023               Telephone call with Jimi who verbalizes understanding and agrees to plan and who agrees to plan and repeated back plan correctly    Lab visit scheduled    Education provided:   Symptom monitoring: monitoring for clotting signs and symptoms and monitoring for stroke signs and symptoms    Plan made per [facility] anticoagulation protocol    Josselin Youssef, DONNA  Anticoagulation Clinic  7/25/2023    _______________________________________________________________________     Anticoagulation Episode Summary       Current INR goal:  2.0-3.0   TTR:  83.0 % (1 y)   Target end date:  Indefinite   Send INR reminders to:  [facility]    Indications    Atrial fibrillation with RVR (H) [I48.91]             Comments:               Anticoagulation Care Providers       Provider Role Specialty Phone number    Francia Finnegan NP Referring Nurse Practitioner - Family [phone]    Nikki Alarcon APRN CNP Referring Nurse Practitioner - Adult Health [phone]    Hannah Knight MD Referring Family Medicine  [phone]

## 2023-08-01 ENCOUNTER — DOCUMENTATION ONLY (OUTPATIENT)
Dept: ANTICOAGULATION | Facility: CLINIC | Age: 78
End: 2023-08-01

## 2023-08-01 ENCOUNTER — ANTICOAGULATION THERAPY VISIT (OUTPATIENT)
Dept: ANTICOAGULATION | Facility: CLINIC | Age: 78
End: 2023-08-01

## 2023-08-01 ENCOUNTER — LAB (OUTPATIENT)
Dept: LAB | Facility: CLINIC | Age: 78
End: 2023-08-01
Payer: COMMERCIAL

## 2023-08-01 DIAGNOSIS — I48.91 ATRIAL FIBRILLATION WITH RVR (H): ICD-10-CM

## 2023-08-01 DIAGNOSIS — I48.91 ATRIAL FIBRILLATION WITH RVR (H): Primary | ICD-10-CM

## 2023-08-01 DIAGNOSIS — I48.91 ATRIAL FIBRILLATION, UNSPECIFIED TYPE (H): ICD-10-CM

## 2023-08-01 LAB — INR BLD: 2.9 (ref 0.9–1.1)

## 2023-08-01 PROCEDURE — 36416 COLLJ CAPILLARY BLOOD SPEC: CPT

## 2023-08-01 PROCEDURE — 85610 PROTHROMBIN TIME: CPT

## 2023-08-01 NOTE — PROGRESS NOTES
ANTICOAGULATION MANAGEMENT     Jimi Moreno 78 year old male is on warfarin with therapeutic INR result. (Goal INR 2.0-3.0)    Recent labs: (last 7 days)     08/01/23  1009   INR 2.9*       ASSESSMENT     Source(s): Chart Review and Patient/Caregiver Call     Warfarin doses taken: Warfarin taken as instructed  Diet: No new diet changes identified  Medication/supplement changes: None noted  New illness, injury, or hospitalization: No  Signs or symptoms of bleeding or clotting: No  Previous result: Subtherapeutic  Additional findings: None       PLAN     Recommended plan for no diet, medication or health factor changes affecting INR     Dosing Instructions: Continue your current warfarin dose with next INR in 1 week       Summary  As of 8/1/2023      Full warfarin instructions:  5 mg every Mon, Wed, Fri; 7.5 mg all other days   Next INR check:  8/8/2023               Telephone call with Jimi who verbalizes understanding and agrees to plan    Lab visit scheduled    Education provided:   Please call back if any changes to your diet, medications or how you've been taking warfarin  Contact [phone]  with any changes, questions or concerns.     Plan made per [facility] anticoagulation protocol    Jayjay VALERO RN  Anticoagulation Clinic  8/1/2023    _______________________________________________________________________     Anticoagulation Episode Summary       Current INR goal:  2.0-3.0   TTR:  82.6 % (1 y)   Target end date:  Indefinite   Send INR reminders to:  [facility]    Indications    Atrial fibrillation with RVR (H) [I48.91]             Comments:               Anticoagulation Care Providers       Provider Role Specialty Phone number    Francia Finnegan NP Referring Nurse Practitioner - Family [phone]    Nikki Alarcon APRN CNP Referring Nurse Practitioner - Adult Health [phone]    Hannah Knight MD Referring Family Medicine [phone]

## 2023-08-01 NOTE — PROGRESS NOTES
ANTICOAGULATION CLINIC REFERRAL RENEWAL REQUEST       An annual renewal order is required for all patients referred to [facility].?  Please review and sign the pended referral order for Jimi Moreno.       ANTICOAGULATION SUMMARY      Warfarin indication(s)   Atrial Fibrillation    Mechanical heart valve present?  NO       Current goal range   INR: 2.0-3.0     Goal appropriate for indication? Goal INR 2-3, standard for indication(s) above     Time in Therapeutic Range (TTR)  (Goal > 60%) 83%       Office visit with referring provider's group within last year no on 4/12/22       Jayjay Sanchez RN  [facility]

## 2023-08-08 ENCOUNTER — LAB (OUTPATIENT)
Dept: LAB | Facility: CLINIC | Age: 78
End: 2023-08-08
Payer: COMMERCIAL

## 2023-08-08 ENCOUNTER — ANTICOAGULATION THERAPY VISIT (OUTPATIENT)
Dept: ANTICOAGULATION | Facility: CLINIC | Age: 78
End: 2023-08-08

## 2023-08-08 DIAGNOSIS — I48.91 ATRIAL FIBRILLATION, UNSPECIFIED TYPE (H): ICD-10-CM

## 2023-08-08 DIAGNOSIS — I48.91 ATRIAL FIBRILLATION WITH RVR (H): ICD-10-CM

## 2023-08-08 DIAGNOSIS — I48.91 ATRIAL FIBRILLATION WITH RVR (H): Primary | ICD-10-CM

## 2023-08-08 LAB — INR BLD: 4.4 (ref 0.9–1.1)

## 2023-08-08 PROCEDURE — 85610 PROTHROMBIN TIME: CPT

## 2023-08-08 NOTE — PROGRESS NOTES
ANTICOAGULATION MANAGEMENT     Jimi Moreno 78 year old male is on warfarin with supratherapeutic INR result. (Goal INR 2.0-3.0)    Recent labs: (last 7 days)     08/08/23  0813   INR 4.4*       ASSESSMENT     Source(s): Chart Review     Warfarin doses taken: Warfarin taken as instructed  Diet: No new diet changes identified  Medication/supplement changes: None noted  New illness, injury, or hospitalization: No  Signs or symptoms of bleeding or clotting: No  Previous result: Therapeutic last visit; previously outside of goal range  Additional findings: None       PLAN     Recommended plan for no diet, medication or health factor changes affecting INR     Dosing Instructions: decrease your warfarin dose (5.6% change) with next INR in 2 weeks       Summary  As of 8/8/2023      Full warfarin instructions:  8/8: 5 mg; Otherwise 7.5 mg every Tue, Thu, Sat; 5 mg all other days   Next INR check:  8/22/2023               Telephone call with Jimi who verbalizes understanding and agrees to plan    Lab visit scheduled    Education provided:   Please call back if any changes to your diet, medications or how you've been taking warfarin  Symptom monitoring: monitoring for bleeding signs and symptoms    Plan made per [facility] anticoagulation protocol    Jyotsna Alcantara, RN  Anticoagulation Clinic  8/8/2023    _______________________________________________________________________     Anticoagulation Episode Summary       Current INR goal:  2.0-3.0   TTR:  80.8 % (1 y)   Target end date:  Indefinite   Send INR reminders to:  [facility]    Indications    Atrial fibrillation with RVR (H) [I48.91]  Atrial fibrillation  unspecified type (H) [I48.91]             Comments:               Anticoagulation Care Providers       Provider Role Specialty Phone number    Francia Finnegan NP Referring Nurse Practitioner - Family [phone]    Nikki Alarcon APRN CNP Referring Nurse Practitioner - Adult Health  [phone]    Hannah Knight MD Referring Family Medicine [phone]    Rodolfo Pedersen MD Referring Family Medicine [phone]

## 2023-08-17 DIAGNOSIS — I48.91 ATRIAL FIBRILLATION WITH RVR (H): ICD-10-CM

## 2023-08-17 RX ORDER — WARFARIN SODIUM 5 MG/1
TABLET ORAL
Qty: 120 TABLET | Refills: 1 | Status: SHIPPED | OUTPATIENT
Start: 2023-08-17 | End: 2024-01-22

## 2023-08-17 NOTE — TELEPHONE ENCOUNTER
ANTICOAGULATION MANAGEMENT:  Medication Refill    Anticoagulation Summary  As of 8/8/2023      Warfarin maintenance plan:  7.5 mg (5 mg x 1.5) every Tue, Thu, Sat; 5 mg (5 mg x 1) all other days   Next INR check:  8/22/2023   Target end date:  Indefinite    Indications    Atrial fibrillation with RVR (H) [I48.91]  Atrial fibrillation  unspecified type (H) [I48.91]                 Anticoagulation Care Providers       Provider Role Specialty Phone number    Francia Finnegan NP Referring Nurse Practitioner - [facility] [phone]    Nikki Alarcon APRN CNP Referring Nurse Practitioner - [facility] [phone]    Hannah Knight MD Referring [facility] [phone]    Rodolfo Pedersen MD Referring [facility] [phone]            Refill Criteria    Visit with referring provider/group: Meets criteria: office visit within referring provider group in the last 1 year on 12/19/22    [facility] referral signed last signed: 08/01/2023; within last year: Yes    Lab monitoring not exceeding 2 weeks overdue: Yes    Jimi meets all criteria for refill. Rx instructions and quantity supplied updated to match patient's current dosing plan. Warfarin 90 day supply with 1 refill granted per [facility] protocol     Loulou Reardon RN  Anticoagulation Clinic

## 2023-08-22 ENCOUNTER — LAB (OUTPATIENT)
Dept: LAB | Facility: CLINIC | Age: 78
End: 2023-08-22
Payer: COMMERCIAL

## 2023-08-22 ENCOUNTER — ANTICOAGULATION THERAPY VISIT (OUTPATIENT)
Dept: ANTICOAGULATION | Facility: CLINIC | Age: 78
End: 2023-08-22

## 2023-08-22 DIAGNOSIS — I48.91 ATRIAL FIBRILLATION WITH RVR (H): Primary | ICD-10-CM

## 2023-08-22 DIAGNOSIS — I48.91 ATRIAL FIBRILLATION, UNSPECIFIED TYPE (H): ICD-10-CM

## 2023-08-22 LAB — INR BLD: 4.1 (ref 0.9–1.1)

## 2023-08-22 PROCEDURE — 85610 PROTHROMBIN TIME: CPT

## 2023-08-22 PROCEDURE — 36415 COLL VENOUS BLD VENIPUNCTURE: CPT

## 2023-08-22 NOTE — PROGRESS NOTES
ANTICOAGULATION MANAGEMENT     Jimi Moreno 78 year old male is on warfarin with supratherapeutic INR result. (Goal INR 2.0-3.0)    Recent labs: (last 7 days)     08/22/23  0815   INR 4.1*       ASSESSMENT     Source(s): Chart Review and Patient/Caregiver Call     Warfarin doses taken: Warfarin taken as instructed  Diet: Protein supplement/shake stopped which maybe affecting INR reports he had been drinking carnation (2 packets) once a week which does contain vit K. He stopped about 2 weeks ago.  Medication/supplement changes: None noted  New illness, injury, or hospitalization: No  Signs or symptoms of bleeding or clotting: No  Previous result: Supratherapeutic  Additional findings: None       PLAN     Recommended plan for ongoing change(s) affecting INR     Dosing Instructions: partial hold then decrease your warfarin dose (11.8% change) with next INR in 2 weeks       Summary  As of 8/22/2023      Full warfarin instructions:  8/22: 2.5 mg; Otherwise 7.5 mg every Sat; 5 mg all other days   Next INR check:  9/5/2023               Telephone call with Jimi who verbalizes understanding and agrees to plan and who agrees to plan and repeated back plan correctly    Lab visit scheduled    Education provided:   Dietary considerations: vitamin K content of foods    Plan made per [facility] anticoagulation protocol    Josselin Youssef RN  Anticoagulation Clinic  8/22/2023    _______________________________________________________________________     Anticoagulation Episode Summary       Current INR goal:  2.0-3.0   TTR:  76.9 % (1 y)   Target end date:  Indefinite   Send INR reminders to:  [facility]    Indications    Atrial fibrillation with RVR (H) [I48.91]  Atrial fibrillation  unspecified type (H) [I48.91]             Comments:               Anticoagulation Care Providers       Provider Role Specialty Phone number    Francia Finnegan, NP Referring Nurse Practitioner - Family [phone]    Nikki Alarcon  PANCHO Dietz CNP Referring Nurse Practitioner - Adult Health [phone]    Hannah Knight MD Referring Family Medicine [phone]    Rodolfo Pedersen MD Referring Family Medicine [phone]

## 2023-09-05 ENCOUNTER — LAB (OUTPATIENT)
Dept: LAB | Facility: CLINIC | Age: 78
End: 2023-09-05
Payer: COMMERCIAL

## 2023-09-05 ENCOUNTER — ANTICOAGULATION THERAPY VISIT (OUTPATIENT)
Dept: ANTICOAGULATION | Facility: CLINIC | Age: 78
End: 2023-09-05

## 2023-09-05 DIAGNOSIS — I48.91 ATRIAL FIBRILLATION, UNSPECIFIED TYPE (H): ICD-10-CM

## 2023-09-05 DIAGNOSIS — I48.91 ATRIAL FIBRILLATION WITH RVR (H): Primary | ICD-10-CM

## 2023-09-05 LAB — INR BLD: 2.3 (ref 0.9–1.1)

## 2023-09-05 PROCEDURE — 36416 COLLJ CAPILLARY BLOOD SPEC: CPT

## 2023-09-05 PROCEDURE — 85610 PROTHROMBIN TIME: CPT

## 2023-09-05 NOTE — PROGRESS NOTES
ANTICOAGULATION MANAGEMENT     Jimi Moreno 78 year old male is on warfarin with therapeutic INR result. (Goal INR 2.0-3.0)    Recent labs: (last 7 days)     09/05/23  0830   INR 2.3*       ASSESSMENT     Source(s): Chart Review and Patient/Caregiver Call     Warfarin doses taken: Warfarin taken as instructed  Diet: No new diet changes identified  Medication/supplement changes: None noted  New illness, injury, or hospitalization: No  Signs or symptoms of bleeding or clotting: No  Previous result: Supratherapeutic  Additional findings: None       PLAN     Recommended plan for no diet, medication or health factor changes affecting INR     Dosing Instructions: Continue your current warfarin dose with next INR in 2 weeks       Summary  As of 9/5/2023      Full warfarin instructions:  7.5 mg every Sat; 5 mg all other days   Next INR check:  9/19/2023               Telephone call with Jimi who verbalizes understanding and agrees to plan and who agrees to plan and repeated back plan correctly    Lab visit scheduled    Education provided:   None required    Plan made per [facility] anticoagulation protocol    Josselin Youssef, DONNA  Anticoagulation Clinic  9/5/2023    _______________________________________________________________________     Anticoagulation Episode Summary       Current INR goal:  2.0-3.0   TTR:  74.6 % (1 y)   Target end date:  Indefinite   Send INR reminders to:  [facility]    Indications    Atrial fibrillation with RVR (H) [I48.91]  Atrial fibrillation  unspecified type (H) [I48.91]             Comments:               Anticoagulation Care Providers       Provider Role Specialty Phone number    Francia Finnegan NP Referring Nurse Practitioner - Family [phone]    Nikki Alarcon APRN CNP Referring Nurse Practitioner - [facility] [phone]    Hannah Knight MD Referring Family Medicine [phone]    Rodolfo Pedersen MD Referring [facility] [phone]

## 2023-09-11 DIAGNOSIS — N40.1 BENIGN PROSTATIC HYPERPLASIA WITH LOWER URINARY TRACT SYMPTOMS, SYMPTOM DETAILS UNSPECIFIED: ICD-10-CM

## 2023-09-11 DIAGNOSIS — I48.20 CHRONIC ATRIAL FIBRILLATION (H): ICD-10-CM

## 2023-09-11 DIAGNOSIS — N40.0 ENLARGED PROSTATE: ICD-10-CM

## 2023-09-11 RX ORDER — METOPROLOL SUCCINATE 50 MG/1
TABLET, EXTENDED RELEASE ORAL
Qty: 30 TABLET | Refills: 0 | Status: SHIPPED | OUTPATIENT
Start: 2023-09-11 | End: 2023-11-07

## 2023-09-11 RX ORDER — DILTIAZEM HYDROCHLORIDE 360 MG/1
CAPSULE, EXTENDED RELEASE ORAL
Qty: 90 CAPSULE | Refills: 0 | Status: SHIPPED | OUTPATIENT
Start: 2023-09-11 | End: 2024-01-22

## 2023-09-11 RX ORDER — TAMSULOSIN HYDROCHLORIDE 0.4 MG/1
CAPSULE ORAL
Qty: 180 CAPSULE | Refills: 0 | Status: SHIPPED | OUTPATIENT
Start: 2023-09-11 | End: 2024-01-22

## 2023-09-11 RX ORDER — FINASTERIDE 5 MG/1
TABLET, FILM COATED ORAL
Qty: 90 TABLET | Refills: 0 | Status: SHIPPED | OUTPATIENT
Start: 2023-09-11 | End: 2023-12-07

## 2023-09-19 ENCOUNTER — LAB (OUTPATIENT)
Dept: LAB | Facility: CLINIC | Age: 78
End: 2023-09-19
Payer: COMMERCIAL

## 2023-09-19 ENCOUNTER — ANTICOAGULATION THERAPY VISIT (OUTPATIENT)
Dept: ANTICOAGULATION | Facility: CLINIC | Age: 78
End: 2023-09-19

## 2023-09-19 DIAGNOSIS — I48.91 ATRIAL FIBRILLATION WITH RVR (H): Primary | ICD-10-CM

## 2023-09-19 DIAGNOSIS — I48.91 ATRIAL FIBRILLATION, UNSPECIFIED TYPE (H): ICD-10-CM

## 2023-09-19 LAB — INR BLD: 2.4 (ref 0.9–1.1)

## 2023-09-19 PROCEDURE — 85610 PROTHROMBIN TIME: CPT

## 2023-09-19 PROCEDURE — 36416 COLLJ CAPILLARY BLOOD SPEC: CPT

## 2023-09-19 NOTE — PROGRESS NOTES
ANTICOAGULATION MANAGEMENT     Jimi Moreno 78 year old male is on warfarin with therapeutic INR result. (Goal INR 2.0-3.0)    Recent labs: (last 7 days)     09/19/23  0845   INR 2.4*       ASSESSMENT     Source(s): Chart Review and Patient/Caregiver Call     Warfarin doses taken: Warfarin taken as instructed  Diet: No new diet changes identified  Medication/supplement changes: None noted  New illness, injury, or hospitalization: No  Signs or symptoms of bleeding or clotting: No  Previous result: Therapeutic last visit; previously outside of goal range  Additional findings: None       PLAN     Recommended plan for no diet, medication or health factor changes affecting INR     Dosing Instructions: Continue your current warfarin dose with next INR in 3 weeks       Summary  As of 9/19/2023      Full warfarin instructions:  7.5 mg every Sat; 5 mg all other days   Next INR check:  10/10/2023               Telephone call with Jimi who verbalizes understanding and agrees to plan    Lab visit scheduled    Education provided:   Please call back if any changes to your diet, medications or how you've been taking warfarin    Plan made per [facility]agulation protocol    Hedy Maynard, RN  Anticoagulation Clinic  9/19/2023    _______________________________________________________________________     Anticoagulation Episode Summary       Current INR goal:  2.0-3.0   TTR:  74.6 % (1 y)   Target end date:  Indefinite   Send INR reminders to:  [facility]    Indications    Atrial fibrillation with RVR (H) [I48.91]  Atrial fibrillation  unspecified type (H) [I48.91]             Comments:               Anticoagulation Care Providers       Provider Role Specialty Phone number    Francia Finnegan NP Referring Nurse Practitioner - Family [phone]    Nikki Alarcon APRN CNP Referring Nurse Practitioner - [facility] [phone]    Hannah Knight MD Referring Family Medicine [phone]     Rodolfo Pedersen MD [facility] [phone]

## 2023-10-10 ENCOUNTER — LAB (OUTPATIENT)
Dept: LAB | Facility: CLINIC | Age: 78
End: 2023-10-10
Payer: COMMERCIAL

## 2023-10-10 ENCOUNTER — ANTICOAGULATION THERAPY VISIT (OUTPATIENT)
Dept: ANTICOAGULATION | Facility: CLINIC | Age: 78
End: 2023-10-10

## 2023-10-10 DIAGNOSIS — I48.91 ATRIAL FIBRILLATION WITH RVR (H): Primary | ICD-10-CM

## 2023-10-10 DIAGNOSIS — I48.91 ATRIAL FIBRILLATION, UNSPECIFIED TYPE (H): ICD-10-CM

## 2023-10-10 LAB — INR BLD: 2.1 (ref 0.9–1.1)

## 2023-10-10 PROCEDURE — 36416 COLLJ CAPILLARY BLOOD SPEC: CPT

## 2023-10-10 PROCEDURE — 85610 PROTHROMBIN TIME: CPT

## 2023-10-10 NOTE — PROGRESS NOTES
ANTICOAGULATION MANAGEMENT     Jimi Moreno 78 year old male is on warfarin with therapeutic INR result. (Goal INR 2.0-3.0)    Recent labs: (last 7 days)     10/10/23  0913   INR 2.1*       ASSESSMENT     Source(s): Chart Review and Patient/Caregiver Call     Warfarin doses taken: Warfarin taken as instructed  Diet: No new diet changes identified  Medication/supplement changes: None noted  New illness, injury, or hospitalization: No  Signs or symptoms of bleeding or clotting: No  Previous result: Therapeutic last 2(+) visits  Additional findings: None       PLAN     Recommended plan for no diet, medication or health factor changes affecting INR     Dosing Instructions: Continue your current warfarin dose with next INR in 4 weeks       Summary  As of 10/10/2023      Full warfarin instructions:  7.5 mg every Sat; 5 mg all other days   Next INR check:  11/7/2023               Telephone call with Jimi who verbalizes understanding and agrees to plan and who agrees to plan and repeated back plan correctly    Lab visit scheduled    Education provided:   None required    Plan made per [facility]agulation protocol    Josselin Youssef, RN  Anticoagulation Clinic  10/10/2023    _______________________________________________________________________     Anticoagulation Episode Summary       Current INR goal:  2.0-3.0   TTR:  74.6 % (1 y)   Target end date:  Indefinite   Send INR reminders to:  [facility]    Indications    Atrial fibrillation with RVR (H) [I48.91]  Atrial fibrillation  unspecified type (H) [I48.91]             Comments:               Anticoagulation Care Providers       Provider Role Specialty Phone number    Francia Finnegan NP Referring Nurse Practitioner - Family [phone]    Nikki Alarcon APRN CNP Referring Nurse Practitioner - [facility] [phone]    Hannah Knight MD Referring Family Medicine [phone]    Rodolfo Pedersen MD Referring Family Medicine  [phone]

## 2023-11-07 ENCOUNTER — ANTICOAGULATION THERAPY VISIT (OUTPATIENT)
Dept: ANTICOAGULATION | Facility: CLINIC | Age: 78
End: 2023-11-07

## 2023-11-07 ENCOUNTER — LAB (OUTPATIENT)
Dept: LAB | Facility: CLINIC | Age: 78
End: 2023-11-07
Payer: COMMERCIAL

## 2023-11-07 DIAGNOSIS — I48.91 ATRIAL FIBRILLATION WITH RVR (H): Primary | ICD-10-CM

## 2023-11-07 DIAGNOSIS — I48.91 ATRIAL FIBRILLATION, UNSPECIFIED TYPE (H): ICD-10-CM

## 2023-11-07 DIAGNOSIS — I48.20 CHRONIC ATRIAL FIBRILLATION (H): ICD-10-CM

## 2023-11-07 LAB — INR BLD: 2.8 (ref 0.9–1.1)

## 2023-11-07 PROCEDURE — 36415 COLL VENOUS BLD VENIPUNCTURE: CPT

## 2023-11-07 PROCEDURE — 85610 PROTHROMBIN TIME: CPT

## 2023-11-07 RX ORDER — METOPROLOL SUCCINATE 50 MG/1
TABLET, EXTENDED RELEASE ORAL
Qty: 30 TABLET | Refills: 0 | Status: SHIPPED | OUTPATIENT
Start: 2023-11-07 | End: 2023-12-11

## 2023-12-07 DIAGNOSIS — N40.0 ENLARGED PROSTATE: ICD-10-CM

## 2023-12-07 RX ORDER — FINASTERIDE 5 MG/1
TABLET, FILM COATED ORAL
Qty: 90 TABLET | Refills: 0 | Status: SHIPPED | OUTPATIENT
Start: 2023-12-07 | End: 2024-01-22

## 2023-12-10 DIAGNOSIS — I48.20 CHRONIC ATRIAL FIBRILLATION (H): ICD-10-CM

## 2023-12-11 RX ORDER — METOPROLOL SUCCINATE 50 MG/1
TABLET, EXTENDED RELEASE ORAL
Qty: 30 TABLET | Refills: 0 | Status: SHIPPED | OUTPATIENT
Start: 2023-12-11 | End: 2024-01-17

## 2023-12-12 ENCOUNTER — ANTICOAGULATION THERAPY VISIT (OUTPATIENT)
Dept: ANTICOAGULATION | Facility: CLINIC | Age: 78
End: 2023-12-12

## 2023-12-12 ENCOUNTER — LAB (OUTPATIENT)
Dept: LAB | Facility: CLINIC | Age: 78
End: 2023-12-12
Payer: COMMERCIAL

## 2023-12-12 DIAGNOSIS — I48.91 ATRIAL FIBRILLATION WITH RVR (H): Primary | ICD-10-CM

## 2023-12-12 DIAGNOSIS — I48.91 ATRIAL FIBRILLATION, UNSPECIFIED TYPE (H): ICD-10-CM

## 2023-12-12 LAB — INR BLD: 2.5 (ref 0.9–1.1)

## 2023-12-12 PROCEDURE — 85610 PROTHROMBIN TIME: CPT

## 2023-12-12 PROCEDURE — 36416 COLLJ CAPILLARY BLOOD SPEC: CPT

## 2023-12-12 NOTE — PROGRESS NOTES
ANTICOAGULATION MANAGEMENT     Jimi Moreno 78 year old male is on warfarin with therapeutic INR result. (Goal INR 2.0-3.0)    Recent labs: (last 7 days)     12/12/23  0922   INR 2.5*       ASSESSMENT     Source(s): Chart Review     Warfarin doses taken: Warfarin taken as instructed  Diet: No new diet changes identified  Medication/supplement changes: None noted  New illness, injury, or hospitalization: No  Signs or symptoms of bleeding or clotting: No  Previous result: Therapeutic last 2(+) visits  Additional findings: None       PLAN     Recommended plan for no diet, medication or health factor changes affecting INR     Dosing Instructions: Continue your current warfarin dose with next INR in 6 weeks       Summary  As of 12/12/2023      Full warfarin instructions:  7.5 mg every Sat; 5 mg all other days   Next INR check:  1/23/2024               Telephone call with Jimi who verbalizes understanding and agrees to plan    Lab visit scheduled    Education provided:   Please call back if any changes to your diet, medications or how you've been taking warfarin    Plan made per [facility] anticoagulation protocol    Jyotsna Alcantara RN  Anticoagulation Clinic  12/12/2023    _______________________________________________________________________     Anticoagulation Episode Summary       Current INR goal:  2.0-3.0   TTR:  74.6% (1 y)   Target end date:  Indefinite   Send INR reminders to:  [facility]    Indications    Atrial fibrillation with RVR (H) [I48.91]  Atrial fibrillation  unspecified type (H) [I48.91]             Comments:               Anticoagulation Care Providers       Provider Role Specialty Phone number    Francia Finnegan NP Referring Nurse Practitioner - Family [phone]    Nikki Alarcon APRN CNP Referring Nurse Practitioner - [facility] [phone]    Hannah Knight MD Referring Family Medicine [phone]    Rodolfo Pedersen MD Referring Family Medicine  [phone]

## 2024-01-22 ENCOUNTER — OFFICE VISIT (OUTPATIENT)
Dept: FAMILY MEDICINE | Facility: CLINIC | Age: 79
End: 2024-01-22
Payer: COMMERCIAL

## 2024-01-22 VITALS
WEIGHT: 218.13 LBS | HEART RATE: 70 BPM | TEMPERATURE: 97 F | BODY MASS INDEX: 27.99 KG/M2 | HEIGHT: 74 IN | SYSTOLIC BLOOD PRESSURE: 137 MMHG | DIASTOLIC BLOOD PRESSURE: 72 MMHG | RESPIRATION RATE: 14 BRPM | OXYGEN SATURATION: 98 %

## 2024-01-22 DIAGNOSIS — Z00.00 ROUTINE GENERAL MEDICAL EXAMINATION AT A HEALTH CARE FACILITY: ICD-10-CM

## 2024-01-22 DIAGNOSIS — I48.20 CHRONIC ATRIAL FIBRILLATION (H): ICD-10-CM

## 2024-01-22 DIAGNOSIS — R73.01 IMPAIRED FASTING GLUCOSE: ICD-10-CM

## 2024-01-22 DIAGNOSIS — N40.0 ENLARGED PROSTATE: ICD-10-CM

## 2024-01-22 DIAGNOSIS — N40.1 BENIGN PROSTATIC HYPERPLASIA WITH LOWER URINARY TRACT SYMPTOMS, SYMPTOM DETAILS UNSPECIFIED: ICD-10-CM

## 2024-01-22 DIAGNOSIS — Z23 ENCOUNTER FOR IMMUNIZATION: ICD-10-CM

## 2024-01-22 DIAGNOSIS — Z00.00 ENCOUNTER FOR MEDICARE ANNUAL WELLNESS EXAM: Primary | ICD-10-CM

## 2024-01-22 DIAGNOSIS — R53.83 FATIGUE, UNSPECIFIED TYPE: ICD-10-CM

## 2024-01-22 DIAGNOSIS — I48.91 ATRIAL FIBRILLATION WITH RVR (H): ICD-10-CM

## 2024-01-22 PROCEDURE — 90662 IIV NO PRSV INCREASED AG IM: CPT | Performed by: FAMILY MEDICINE

## 2024-01-22 PROCEDURE — 99397 PER PM REEVAL EST PAT 65+ YR: CPT | Mod: 25 | Performed by: FAMILY MEDICINE

## 2024-01-22 PROCEDURE — G0008 ADMIN INFLUENZA VIRUS VAC: HCPCS | Performed by: FAMILY MEDICINE

## 2024-01-22 RX ORDER — FINASTERIDE 5 MG/1
1 TABLET, FILM COATED ORAL DAILY
Qty: 90 TABLET | Refills: 3 | Status: SHIPPED | OUTPATIENT
Start: 2024-01-22

## 2024-01-22 RX ORDER — WARFARIN SODIUM 5 MG/1
TABLET ORAL
Qty: 120 TABLET | Refills: 5 | Status: SHIPPED | OUTPATIENT
Start: 2024-01-22

## 2024-01-22 RX ORDER — METOPROLOL SUCCINATE 50 MG/1
50 TABLET, EXTENDED RELEASE ORAL DAILY
Qty: 90 TABLET | Refills: 3 | Status: SHIPPED | OUTPATIENT
Start: 2024-01-22

## 2024-01-22 RX ORDER — DILTIAZEM HYDROCHLORIDE 360 MG/1
CAPSULE, EXTENDED RELEASE ORAL
Qty: 90 CAPSULE | Refills: 3 | Status: SHIPPED | OUTPATIENT
Start: 2024-01-22

## 2024-01-22 RX ORDER — TAMSULOSIN HYDROCHLORIDE 0.4 MG/1
CAPSULE ORAL
Qty: 180 CAPSULE | Refills: 3 | Status: SHIPPED | OUTPATIENT
Start: 2024-01-22

## 2024-01-22 RX ORDER — RESPIRATORY SYNCYTIAL VIRUS VACCINE 120MCG/0.5
0.5 KIT INTRAMUSCULAR ONCE
Qty: 1 EACH | Refills: 0 | Status: CANCELLED | OUTPATIENT
Start: 2024-01-22 | End: 2024-01-22

## 2024-01-22 ASSESSMENT — PAIN SCALES - GENERAL: PAINLEVEL: NO PAIN (0)

## 2024-01-22 NOTE — PROGRESS NOTES
"      Renan Krause is a 79 year old, presenting for the following health issues:  Wellness Visit (Fasting) and Recheck Medication        1/22/2024     8:15 AM   Additional Questions   Roomed by Shaina Larios     History of Present Illness       Reason for visit:  Medication recheck and wellness check    He eats 0-1 servings of fruits and vegetables daily.He consumes 0 sweetened beverage(s) daily.He exercises with enough effort to increase his heart rate 9 or less minutes per day.  He exercises with enough effort to increase his heart rate 3 or less days per week.   He is taking medications regularly.         Annual Wellness Visit    Patient has been advised of split billing requirements and indicates understanding: Yes     Are you in the first 12 months of your Medicare Part B coverage?  No    Physical Health:  In general, how would you rate your overall physical health? good  Outside of work, how many days during the week do you exercise?none  Outside of work, approximately how many minutes a day do you exercise?not applicable  If you drink alcohol do you typically have >3 drinks per day or >7 drinks per week? No  Do you usually eat at least 4 servings of fruit and vegetables a day, include whole grains & fiber and avoid regularly eating high fat or \"junk\" foods? Yes  Do you have any problems taking medications regularly? No  Do you have any side effects from medications? none  Needs assistance for the following daily activities: no assistance needed  Which of the following safety concerns are present in your home?  none identified   Hearing impairment: No  In the past 6 months, have you been bothered by leaking of urine? no    Mental Health:  In general, how would you rate your overall mental or emotional health? good    Today's PHQ-2 Score:       1/22/2024     8:15 AM   PHQ-2 ( 1999 [employer])   Q1: Little interest or pleasure in doing things 0   Q2: Feeling down, depressed or hopeless 0   PHQ-2 Score 0   Q1: " Little interest or pleasure in doing things Not at all   Q2: Feeling down, depressed or hopeless Not at all   PHQ-2 Score 0         Do you feel safe in your environment? Yes    Have you ever done Advance Care Planning? (For example, a Health Directive, POLST, or a discussion with a medical provider or your loved ones about your wishes)? Yes, advance care planning is on file.    Fall risk:  Fallen 2 or more times in the past year?: No  Any fall with injury in the past year?: No    Cognitive Screenin) Repeat 3 items (Leader, Season, Table)     2) Clock draw:  NORMAL  3) 3 item recall:  Recalls 3 objects  Results: 3 items recalled: COGNITIVE IMPAIRMENT LESS LIKELY    Mini-CogTM Copyright S Jagjit. Licensed by the author for use in [facility]; reprinted with permission (mark@[facility]). All rights reserved.      Do you have sleep apnea, excessive snoring or daytime drowsiness? : no    Social History     Tobacco Use    Smoking status: Never     Passive exposure: Never    Smokeless tobacco: Never   Substance Use Topics    Alcohol use: Yes     Comment: 5 pm most days             3/29/2022     9:19 AM   Alcohol Use   Prescreen: >3 drinks/day or >7 drinks/week? No          No data to display              Do you have a current opioid prescription? No  Do you use any other controlled substances or medications that are not prescribed by a provider? None     Current providers sharing in care for this patient include:    Patient Care Team:  Rodolfo Pedersen MD as PCP - General (Family Medicine)  Rodolfo Pedersen MD as Assigned PCP  Ayo Mittal MD as MD (Surgery)  Aubrie Betancourt PA-C as Assigned Surgical Provider    The following health maintenance items are reviewed in Epic and correct as of today:  Health Maintenance   Topic Date Due    ZOSTER IMMUNIZATION (1 of 2) Never done    RSV VACCINE (Pregnancy & 60+) (1 - 1-dose 60+ series) Never done    ANNUAL REVIEW OF HM ORDERS  10/27/2022     "COLORECTAL CANCER SCREENING  11/02/2022    MEDICARE ANNUAL WELLNESS VISIT  03/29/2023    INFLUENZA VACCINE (1) 09/01/2023    COVID-19 Vaccine (2 - 2023-24 season) 09/01/2023    FALL RISK ASSESSMENT  01/22/2025    DTAP/TDAP/TD IMMUNIZATION (2 - Td or Tdap) 09/26/2025    ADVANCE CARE PLANNING  01/22/2029    PHQ-2 (once per calendar year)  Completed    Pneumococcal Vaccine: 65+ Years  Completed    IPV IMMUNIZATION  Aged Out    HPV IMMUNIZATION  Aged Out    MENINGITIS IMMUNIZATION  Aged Out    RSV MONOCLONAL ANTIBODY  Aged Out       Patient has been advised of split billing requirements and indicates understanding: Yes    Appropriate preventive services were discussed with this patient, including applicable screening as appropriate for fall prevention, nutrition, physical activity, Tobacco-use cessation, weight loss and cognition.  Checklist reviewing preventive services available has been given to the patient.      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  Physical Exam  Constitutional:       Appearance: He is well-developed.   HENT:      Head: Normocephalic and atraumatic.   Eyes:      Conjunctiva/sclera: Conjunctivae normal.   Neck:      Vascular: No carotid bruit.   Cardiovascular:      Rate and Rhythm: Normal rate. Rhythm irregular.      Heart sounds: Normal heart sounds.      Comments: Mildly irregular  Pulmonary:      Effort: Pulmonary effort is normal. No respiratory distress.      Breath sounds: Normal breath sounds. "   Neurological:      Mental Status: He is alert and oriented to person, place, and time.      Cranial Nerves: No cranial nerve deficit.   Psychiatric:         Speech: Speech normal.         Behavior: Behavior normal.      No edema    Radials symmetric       1. Encounter for Medicare annual wellness exam    2. Routine general medical examination at a health care facility    3. Screen for colon cancer    4. Encounter for immunization    5. Chronic atrial fibrillation (H)    6. Enlarged prostate    7. Benign prostatic hyperplasia with lower urinary tract symptoms, symptom details unspecified    8. Atrial fibrillation with RVR (H)    9. Impaired fasting glucose    10. Fatigue, unspecified type      Discussed multiple issues  Get labs done when he gets inr tomorrow  Refill same meds  Keep working on healthy diet/exercise   Flu shot given        Signed Electronically by: Rodolfo Pedersen MD  He is at risk for lack of exercise and has been provided with information to increase physical activity for the benefit of his well-being.

## 2024-01-22 NOTE — PATIENT INSTRUCTIONS
"  Get labs drawn tomorrow    Stay on same medications    Keep working on healthy diet/exercise     Call with problems/ questions    Could get shingles shot and/ or RSV shot at pharmacy          Patient Education   Personalized Prevention Plan  You are due for the preventive services outlined below.  Your care team is available to assist you in scheduling these services.  If you have already completed any of these items, please share that information with your care team to update in your medical record.  Health Maintenance Due   Topic Date Due    Zoster (Shingles) Vaccine (1 of 2) Never done    RSV VACCINE (Pregnancy & 60+) (1 - 1-dose 60+ series) Never done    ANNUAL REVIEW OF HM ORDERS  10/27/2022    Colorectal Cancer Screening  11/02/2022    Annual Wellness Visit  03/29/2023    Flu Vaccine (1) 09/01/2023    COVID-19 Vaccine (2 - 2023-24 season) 09/01/2023     Learning About Being Physically Active  What is physical activity?     Being physically active means doing any kind of activity that gets your body moving.  The types of physical activity that can help you get fit and stay healthy include:  Aerobic or \"cardio\" activities. These make your heart beat faster and make you breathe harder, such as brisk walking, riding a bike, or running. They strengthen your heart and lungs and build up your endurance.  Strength training activities. These make your muscles work against, or \"resist,\" something. Examples include lifting weights or doing push-ups. These activities help tone and strengthen your muscles and bones.  Stretches. These let you move your joints and muscles through their full range of motion. Stretching helps you be more flexible.  Reaching a balance between these three types of physical activity is important because each one contributes to your overall fitness.  What are the benefits of being active?  Being active is one of the best things you can do for your health. It helps you to:  Feel stronger and have " "more energy to do all the things you like to do.  Focus better at school or work.  Feel, think, and sleep better.  Reach and stay at a healthy weight.  Lose fat and build lean muscle.  Lower your risk for serious health problems, including diabetes, heart attack, high blood pressure, and some cancers.  Keep your heart, lungs, bones, muscles, and joints strong and healthy.  How can you make being active part of your life?  Start slowly. Make it your long-term goal to get at least 30 minutes of exercise on most days of the week. Walking is a good choice. You also may want to do other activities, such as running, swimming, cycling, or playing tennis or team sports.  Pick activities that you like--ones that make your heart beat faster, your muscles stronger, and your muscles and joints more flexible. If you find more than one thing you like doing, do them all. You don't have to do the same thing every day.  Get your heart pumping every day. Any activity that makes your heart beat faster and keeps it at that rate for a while counts.  Here are some great ways to get your heart beating faster:  Go for a brisk walk, run, or hike.  Go for a swim or bike ride.  Take an online exercise class or dance.  Play a game of touch football, basketball, or soccer.  Play tennis, pickleball, or racquetball.  Climb stairs.  Even some household chores can be aerobic. Just do them at a faster pace. Raking or mowing the lawn, sweeping the garage, and vacuuming and cleaning your home all can help get your heart rate up.  Strengthen your muscles during the week. You don't have to lift heavy weights or grow big, bulky muscles to get stronger. Doing a few simple activities that make your muscles work against, or \"resist,\" something can help you get stronger. Aim for at least twice a week.  For example, you can:  Do push-ups or sit-ups, which use your own body weight as resistance.  Lift weights or dumbbells or use stretch bands at home or in a " "gym or community center.  Stretch your muscles often. Stretching will help you as you become more active. It can help you stay flexible and loosen tight muscles. It can also help improve your balance and posture and can be a great way to relax.  Be sure to stretch the muscles you'll be using when you work out. It's best to warm your muscles slightly before you stretch them. Walk or do some other light aerobic activity for a few minutes. Then start stretching.  When you stretch your muscles:  Do it slowly. Stretching is not about going fast or making sudden movements.  Don't push or bounce during a stretch.  Hold each stretch for at least 15 to 30 seconds, if you can. You should feel a stretch in the muscle, but not pain.  Breathe out as you do the stretch. Then breathe in as you hold the stretch. Don't hold your breath.  If you're worried about how more activity might affect your health, have a checkup before you start. Follow any special advice your doctor gives you for getting a smart start.  Where can you learn more?  Go to https://www.[employer] [facility] [facility].net/patiented  Enter W332 in the search box to learn more about \"Learning About Being Physically Active.\"  Current as of: June 6, 2023               Content Version: 13.8    3118-3019 [employer].   Care instructions adapted under license by your healthcare professional. If you have questions about a medical condition or this instruction, always ask your healthcare professional. [employer] disclaims any warranty or liability for your use of this information.         "

## 2024-01-23 ENCOUNTER — LAB (OUTPATIENT)
Dept: LAB | Facility: CLINIC | Age: 79
End: 2024-01-23
Payer: COMMERCIAL

## 2024-01-23 ENCOUNTER — ANTICOAGULATION THERAPY VISIT (OUTPATIENT)
Dept: ANTICOAGULATION | Facility: CLINIC | Age: 79
End: 2024-01-23

## 2024-01-23 DIAGNOSIS — I48.91 ATRIAL FIBRILLATION, UNSPECIFIED TYPE (H): ICD-10-CM

## 2024-01-23 DIAGNOSIS — I48.91 ATRIAL FIBRILLATION WITH RVR (H): Primary | ICD-10-CM

## 2024-01-23 DIAGNOSIS — R53.83 FATIGUE, UNSPECIFIED TYPE: ICD-10-CM

## 2024-01-23 DIAGNOSIS — R73.01 IMPAIRED FASTING GLUCOSE: ICD-10-CM

## 2024-01-23 LAB
ALBUMIN SERPL BCG-MCNC: 4.1 G/DL (ref 3.5–5.2)
ALP SERPL-CCNC: 84 U/L (ref 40–150)
ALT SERPL W P-5'-P-CCNC: 18 U/L (ref 0–70)
ANION GAP SERPL CALCULATED.3IONS-SCNC: 9 MMOL/L (ref 7–15)
AST SERPL W P-5'-P-CCNC: 22 U/L (ref 0–45)
BASOPHILS # BLD AUTO: 0 10E3/UL (ref 0–0.2)
BASOPHILS NFR BLD AUTO: 0 %
BILIRUB SERPL-MCNC: 0.4 MG/DL
BUN SERPL-MCNC: 13.9 MG/DL (ref 8–23)
CALCIUM SERPL-MCNC: 9.1 MG/DL (ref 8.8–10.2)
CHLORIDE SERPL-SCNC: 110 MMOL/L (ref 98–107)
CREAT SERPL-MCNC: 1.04 MG/DL (ref 0.67–1.17)
DEPRECATED HCO3 PLAS-SCNC: 24 MMOL/L (ref 22–29)
EGFRCR SERPLBLD CKD-EPI 2021: 72 ML/MIN/1.73M2
EOSINOPHIL # BLD AUTO: 0.1 10E3/UL (ref 0–0.7)
EOSINOPHIL NFR BLD AUTO: 2 %
ERYTHROCYTE [DISTWIDTH] IN BLOOD BY AUTOMATED COUNT: 12.4 % (ref 10–15)
GLUCOSE SERPL-MCNC: 94 MG/DL (ref 70–99)
HBA1C MFR BLD: 5.4 % (ref 0–5.6)
HCT VFR BLD AUTO: 46.8 % (ref 40–53)
HGB BLD-MCNC: 15.2 G/DL (ref 13.3–17.7)
IMM GRANULOCYTES # BLD: 0 10E3/UL
IMM GRANULOCYTES NFR BLD: 0 %
INR BLD: 2.4 (ref 0.9–1.1)
LYMPHOCYTES # BLD AUTO: 1.3 10E3/UL (ref 0.8–5.3)
LYMPHOCYTES NFR BLD AUTO: 29 %
MCH RBC QN AUTO: 30.8 PG (ref 26.5–33)
MCHC RBC AUTO-ENTMCNC: 32.5 G/DL (ref 31.5–36.5)
MCV RBC AUTO: 95 FL (ref 78–100)
MONOCYTES # BLD AUTO: 0.6 10E3/UL (ref 0–1.3)
MONOCYTES NFR BLD AUTO: 12 %
NEUTROPHILS # BLD AUTO: 2.6 10E3/UL (ref 1.6–8.3)
NEUTROPHILS NFR BLD AUTO: 56 %
PLATELET # BLD AUTO: 179 10E3/UL (ref 150–450)
POTASSIUM SERPL-SCNC: 4.3 MMOL/L (ref 3.4–5.3)
PROT SERPL-MCNC: 6.7 G/DL (ref 6.4–8.3)
RBC # BLD AUTO: 4.93 10E6/UL (ref 4.4–5.9)
SODIUM SERPL-SCNC: 143 MMOL/L (ref 135–145)
TSH SERPL DL<=0.005 MIU/L-ACNC: 3.48 UIU/ML (ref 0.3–4.2)
WBC # BLD AUTO: 4.6 10E3/UL (ref 4–11)

## 2024-01-23 PROCEDURE — 80053 COMPREHEN METABOLIC PANEL: CPT

## 2024-01-23 PROCEDURE — 85025 COMPLETE CBC W/AUTO DIFF WBC: CPT

## 2024-01-23 PROCEDURE — 36415 COLL VENOUS BLD VENIPUNCTURE: CPT

## 2024-01-23 PROCEDURE — 85610 PROTHROMBIN TIME: CPT

## 2024-01-23 PROCEDURE — 83036 HEMOGLOBIN GLYCOSYLATED A1C: CPT

## 2024-01-23 PROCEDURE — 84443 ASSAY THYROID STIM HORMONE: CPT

## 2024-01-23 NOTE — PROGRESS NOTES
ANTICOAGULATION MANAGEMENT     Jimi Moreno 79 year old male is on warfarin with therapeutic INR result. (Goal INR 2.0-3.0)    Recent labs: (last 7 days)     01/23/24  0813   INR 2.4*       ASSESSMENT     Source(s): Chart Review and Patient/Caregiver Call     Warfarin doses taken: Warfarin taken as instructed  Diet: No new diet changes identified  Medication/supplement changes: None noted  New illness, injury, or hospitalization: No  Signs or symptoms of bleeding or clotting: No  Previous result: Therapeutic last 2(+) visits  Additional findings: None       PLAN     Recommended plan for no diet, medication or health factor changes affecting INR     Dosing Instructions: Continue your current warfarin dose with next INR in 6 weeks       Summary  As of 1/23/2024      Full warfarin instructions:  7.5 mg every Sat; 5 mg all other days   Next INR check:  3/5/2024               Telephone call with Jimi who verbalizes understanding and agrees to plan    Lab visit scheduled    Education provided:   Please call back if any changes to your diet, medications or how you've been taking warfarin    Plan made per [facility] anticoagulation protocol    Jyotsna Alcantara, RN  Anticoagulation Clinic  1/23/2024    _______________________________________________________________________     Anticoagulation Episode Summary       Current INR goal:  2.0-3.0   TTR:  78.2% (1 y)   Target end date:  Indefinite   Send INR reminders to:  [facility] [place]    Indications    Atrial fibrillation with RVR (H) [I48.91]  Atrial fibrillation  unspecified type (H) [I48.91]             Comments:               Anticoagulation Care Providers       Provider Role Specialty Phone number    Francia Finnegan NP Referring Nurse Practitioner - Family [phone]    Nikki Alarcon APRN CNP Referring Nurse Practitioner - [facility] [phone]    Hannah Knight MD Referring Family Medicine [phone]    Rodolfo Pedersen MD Referring  Family Medicine [phone]

## 2024-02-25 NOTE — PROGRESS NOTES
ANTICOAGULATION MANAGEMENT     Jimi Moreno 78 year old male is on warfarin with therapeutic INR result. (Goal INR 2.0-3.0)    Recent labs: (last 7 days)     11/07/23  0917   INR 2.8*       ASSESSMENT     Source(s): Chart Review and Patient/Caregiver Call     Warfarin doses taken: Warfarin taken as instructed  Diet: No new diet changes identified  Medication/supplement changes: None noted  New illness, injury, or hospitalization: No  Signs or symptoms of bleeding or clotting: No  Previous result: Therapeutic last 2(+) visits  Additional findings: None       PLAN     Recommended plan for no diet, medication or health factor changes affecting INR     Dosing Instructions: Continue your current warfarin dose with next INR in 5 weeks       Summary  As of 11/7/2023      Full warfarin instructions:  7.5 mg every Sat; 5 mg all other days   Next INR check:  12/12/2023               Telephone call with Jimi who verbalizes understanding and agrees to plan    Lab visit scheduled    Education provided:   Please call back if any changes to your diet, medications or how you've been taking warfarin  Contact [phone]  with any changes, questions or concerns.     Plan made per [facility] anticoagulation protocol    Jayjay VALERO RN  Anticoagulation Clinic  11/7/2023    _______________________________________________________________________     Anticoagulation Episode Summary       Current INR goal:  2.0-3.0   TTR:  74.6% (1 y)   Target end date:  Indefinite   Send INR reminders to:  [facility] [place]    Indications    Atrial fibrillation with RVR (H) [I48.91]  Atrial fibrillation  unspecified type (H) [I48.91]             Comments:               Anticoagulation Care Providers       Provider Role Specialty Phone number    Francia Finnegan NP Referring Nurse Practitioner - Family [phone]    Nikki Alarcon APRN CNP Referring Nurse Practitioner - Adult Health [phone]    Hannah Knight MD Referring  Family Medicine [phone]    Rodolfo Pedersen MD Referring [facility] [phone]             None

## 2024-03-05 ENCOUNTER — ANTICOAGULATION THERAPY VISIT (OUTPATIENT)
Dept: ANTICOAGULATION | Facility: CLINIC | Age: 79
End: 2024-03-05

## 2024-03-05 ENCOUNTER — LAB (OUTPATIENT)
Dept: LAB | Facility: CLINIC | Age: 79
End: 2024-03-05
Payer: COMMERCIAL

## 2024-03-05 DIAGNOSIS — I48.91 ATRIAL FIBRILLATION WITH RVR (H): Primary | ICD-10-CM

## 2024-03-05 DIAGNOSIS — I48.91 ATRIAL FIBRILLATION, UNSPECIFIED TYPE (H): ICD-10-CM

## 2024-03-05 LAB — INR BLD: 2 (ref 0.9–1.1)

## 2024-03-05 PROCEDURE — 36416 COLLJ CAPILLARY BLOOD SPEC: CPT

## 2024-03-05 PROCEDURE — 85610 PROTHROMBIN TIME: CPT

## 2024-03-05 NOTE — PROGRESS NOTES
ANTICOAGULATION MANAGEMENT     Jimi Moreno 79 year old male is on warfarin with therapeutic INR result. (Goal INR 2.0-3.0)    Recent labs: (last 7 days)     03/05/24  0810   INR 2.0*       ASSESSMENT     Source(s): Chart Review and Patient/Caregiver Call     Warfarin doses taken: Warfarin taken as instructed  Diet: No new diet changes identified  Medication/supplement changes: None noted  New illness, injury, or hospitalization: No  Signs or symptoms of bleeding or clotting: No  Previous result: Therapeutic last 2(+) visits  Additional findings: None       PLAN     Recommended plan for no diet, medication or health factor changes affecting INR     Dosing Instructions: Continue your current warfarin dose with next INR in 6 weeks       Summary  As of 3/5/2024      Full warfarin instructions:  7.5 mg every Sat; 5 mg all other days   Next INR check:  4/16/2024               Telephone call with Jimi who verbalizes understanding and agrees to plan    Lab visit scheduled    Education provided:   Contact [phone]  with any changes, questions or concerns.     Plan made per [facility] anticoagulation protocol    Loulou Reardon RN  Anticoagulation Clinic  3/5/2024    _______________________________________________________________________     Anticoagulation Episode Summary       Current INR goal:  2.0-3.0   TTR:  79.3% (1 y)   Target end date:  Indefinite   Send INR reminders to:  [facility] [place]    Indications    Atrial fibrillation with RVR (H) [I48.91]             Comments:               Anticoagulation Care Providers       Provider Role Specialty Phone number    Francia Finnegan NP Referring Nurse Practitioner - Family [phone]    Nikki Alarcon APRN CNP Referring Nurse Practitioner - [facility] [phone]    Hannah Knight MD Referring Family Medicine [phone]    Rodolfo Pedersen MD Referring Family Medicine [phone]

## 2024-04-16 ENCOUNTER — LAB (OUTPATIENT)
Dept: LAB | Facility: CLINIC | Age: 79
End: 2024-04-16
Payer: COMMERCIAL

## 2024-04-16 ENCOUNTER — ANTICOAGULATION THERAPY VISIT (OUTPATIENT)
Dept: ANTICOAGULATION | Facility: CLINIC | Age: 79
End: 2024-04-16

## 2024-04-16 DIAGNOSIS — I48.91 ATRIAL FIBRILLATION WITH RVR (H): Primary | ICD-10-CM

## 2024-04-16 DIAGNOSIS — I48.91 ATRIAL FIBRILLATION, UNSPECIFIED TYPE (H): ICD-10-CM

## 2024-04-16 LAB — INR BLD: 2.1 (ref 0.9–1.1)

## 2024-04-16 PROCEDURE — 36416 COLLJ CAPILLARY BLOOD SPEC: CPT

## 2024-04-16 PROCEDURE — 85610 PROTHROMBIN TIME: CPT

## 2024-04-16 NOTE — PROGRESS NOTES
ANTICOAGULATION MANAGEMENT     Jimi Moreno 79 year old male is on warfarin with therapeutic INR result. (Goal INR 2.0-3.0)    Recent labs: (last 7 days)     04/16/24  0941   INR 2.1*       ASSESSMENT     Source(s): Chart Review and Patient/Caregiver Call     Warfarin doses taken: Warfarin taken as instructed  Diet: No new diet changes identified  Medication/supplement changes: None noted  New illness, injury, or hospitalization: No  Signs or symptoms of bleeding or clotting: No  Previous result: Therapeutic last 2(+) visits  Additional findings: None       PLAN     Recommended plan for no diet, medication or health factor changes affecting INR     Dosing Instructions: Continue your current warfarin dose with next INR in 6 weeks       Summary  As of 4/16/2024      Full warfarin instructions:  7.5 mg every Sat; 5 mg all other days   Next INR check:                 Telephone call with Jimi who verbalizes understanding and agrees to plan    Lab visit scheduled    Education provided:   Please call back if any changes to your diet, medications or how you've been taking warfarin    Plan made per [facility] anticoagulation protocol    Jyotsna Alcantara, RN  Anticoagulation Clinic  4/16/2024    _______________________________________________________________________     Anticoagulation Episode Summary       Current INR goal:  2.0-3.0   TTR:  79.3% (1 y)   Target end date:  Indefinite   [facility] [place]    Indications    Atrial fibrillation with RVR (H) [I48.91]             Comments:               Anticoagulation Care Providers       Provider Role Specialty Phone number    Francia Finnegan NP Referring Nurse Practitioner - Family [phone]    Nikki Alarcon APRN CNP Referring Nurse Practitioner - [facility] [phone]    Hannah Knight MD Referring Family Medicine [phone]    Rodolfo Pedersen MD Referring Family Medicine [phone]

## 2024-05-28 ENCOUNTER — LAB (OUTPATIENT)
Dept: LAB | Facility: CLINIC | Age: 79
End: 2024-05-28
Payer: COMMERCIAL

## 2024-05-28 ENCOUNTER — ANTICOAGULATION THERAPY VISIT (OUTPATIENT)
Dept: ANTICOAGULATION | Facility: CLINIC | Age: 79
End: 2024-05-28

## 2024-05-28 DIAGNOSIS — I48.91 ATRIAL FIBRILLATION, UNSPECIFIED TYPE (H): ICD-10-CM

## 2024-05-28 DIAGNOSIS — I48.91 ATRIAL FIBRILLATION WITH RVR (H): Primary | ICD-10-CM

## 2024-05-28 LAB — INR BLD: 3.2 (ref 0.9–1.1)

## 2024-05-28 PROCEDURE — 85610 PROTHROMBIN TIME: CPT

## 2024-05-28 PROCEDURE — 36416 COLLJ CAPILLARY BLOOD SPEC: CPT

## 2024-05-28 NOTE — PROGRESS NOTES
ANTICOAGULATION MANAGEMENT     Jimi Moreno 79 year old male is on warfarin with supratherapeutic INR result. (Goal INR 2.0-3.0)    Recent labs: (last 7 days)     05/28/24  1007   INR 3.2*       ASSESSMENT     Source(s): Chart Review and Patient/Caregiver Call     Warfarin doses taken: Warfarin taken as instructed  Diet: Decreased greens/vitamin K in diet; plans to resume previous intake  Medication/supplement changes: None noted  New illness, injury, or hospitalization: No  Signs or symptoms of bleeding or clotting: No  Previous result: Therapeutic last 2(+) visits  Additional findings: None       PLAN     Recommended plan for temporary change(s) affecting INR     Dosing Instructions: Continue your current warfarin dose with next INR in 2 weeks       Summary  As of 5/28/2024      Full warfarin instructions:  7.5 mg every Sat; 5 mg all other days   Next INR check:  6/11/2024               Telephone call with Jimi who verbalizes understanding and agrees to plan    Lab visit scheduled    Education provided:   Please call back if any changes to your diet, medications or how you've been taking warfarin    Plan made per [facility] anticoagulation protocol    Jyotsna Alcantara, RN  Anticoagulation Clinic  5/28/2024    _______________________________________________________________________     Anticoagulation Episode Summary       Current INR goal:  2.0-3.0   TTR:  77.2% (1 y)   Target end date:  Indefinite   [facility] [place]    Indications    Atrial fibrillation with RVR (H) [I48.91]             Comments:               Anticoagulation Care Providers       Provider Role Specialty Phone number    Francia Finnegan NP Referring Nurse Practitioner - Family [phone]    Nikki Alarcon APRN CNP Referring Nurse Practitioner - [facility] [phone]    Hannah Knight MD Referring Family Medicine [phone]    Rodolfo Pedersen MD Referring [facility] Medicine [phone]

## 2024-06-11 ENCOUNTER — ANTICOAGULATION THERAPY VISIT (OUTPATIENT)
Dept: ANTICOAGULATION | Facility: CLINIC | Age: 79
End: 2024-06-11

## 2024-06-11 ENCOUNTER — LAB (OUTPATIENT)
Dept: LAB | Facility: CLINIC | Age: 79
End: 2024-06-11
Payer: COMMERCIAL

## 2024-06-11 DIAGNOSIS — I48.91 ATRIAL FIBRILLATION, UNSPECIFIED TYPE (H): ICD-10-CM

## 2024-06-11 DIAGNOSIS — I48.91 ATRIAL FIBRILLATION WITH RVR (H): Primary | ICD-10-CM

## 2024-06-11 LAB — INR BLD: 2.5 (ref 0.9–1.1)

## 2024-06-11 PROCEDURE — 36416 COLLJ CAPILLARY BLOOD SPEC: CPT

## 2024-06-11 PROCEDURE — 85610 PROTHROMBIN TIME: CPT

## 2024-06-11 NOTE — PROGRESS NOTES
ANTICOAGULATION MANAGEMENT     Jimi Moreno 79 year old male is on warfarin with therapeutic INR result. (Goal INR 2.0-3.0)    Recent labs: (last 7 days)     06/11/24  0817   INR 2.5*       ASSESSMENT     Source(s): Chart Review and Patient/Caregiver Call     Warfarin doses taken: Warfarin taken as instructed  Diet: No new diet changes identified  Medication/supplement changes: None noted  New illness, injury, or hospitalization: No  Signs or symptoms of bleeding or clotting: No  Previous result: Supratherapeutic  Additional findings: None       PLAN     Recommended plan for no diet, medication or health factor changes affecting INR     Dosing Instructions: Continue your current warfarin dose with next INR in 6 weeks       Summary  As of 6/11/2024      Full warfarin instructions:  7.5 mg every Sat; 5 mg all other days   Next INR check:  7/23/2024               Telephone call with Jimi who verbalizes understanding and agrees to plan and who agrees to plan and repeated back plan correctly    Lab visit scheduled    Education provided:   None required    Plan made per [facility] anticoagulation protocol    Josselin Youssef, DONNA  Anticoagulation Clinic  6/11/2024    _______________________________________________________________________     Anticoagulation Episode Summary       Current INR goal:  2.0-3.0   TTR:  76.7% (1 y)   Target end date:  Indefinite   Send INR reminders to:  [facility]    Indications    Atrial fibrillation with RVR (H) [I48.91]             Comments:               Anticoagulation Care Providers       Provider Role Specialty Phone number    Francia Finnegan NP Referring Nurse Practitioner - Family [phone]    Nikki Alarcon APRN CNP Referring Nurse Practitioner - [facility] [phone]    Hannah Knight MD Referring Family Medicine [phone]    Rodolfo Pedersen MD Referring [facility] Medicine [phone]

## 2024-07-23 ENCOUNTER — ANTICOAGULATION THERAPY VISIT (OUTPATIENT)
Dept: ANTICOAGULATION | Facility: CLINIC | Age: 79
End: 2024-07-23

## 2024-07-23 ENCOUNTER — DOCUMENTATION ONLY (OUTPATIENT)
Dept: ANTICOAGULATION | Facility: CLINIC | Age: 79
End: 2024-07-23

## 2024-07-23 ENCOUNTER — LAB (OUTPATIENT)
Dept: LAB | Facility: CLINIC | Age: 79
End: 2024-07-23
Payer: COMMERCIAL

## 2024-07-23 DIAGNOSIS — I48.91 ATRIAL FIBRILLATION, UNSPECIFIED TYPE (H): ICD-10-CM

## 2024-07-23 DIAGNOSIS — I48.91 ATRIAL FIBRILLATION WITH RVR (H): Primary | ICD-10-CM

## 2024-07-23 DIAGNOSIS — I48.91 ATRIAL FIBRILLATION, UNSPECIFIED TYPE (H): Primary | ICD-10-CM

## 2024-07-23 LAB — INR BLD: 2 (ref 0.9–1.1)

## 2024-07-23 PROCEDURE — 85610 PROTHROMBIN TIME: CPT

## 2024-07-23 PROCEDURE — 36416 COLLJ CAPILLARY BLOOD SPEC: CPT

## 2024-07-23 NOTE — PROGRESS NOTES
ANTICOAGULATION MANAGEMENT     Jimi Moreno 79 year old male is on warfarin with therapeutic INR result. (Goal INR 2.0-3.0)    Recent labs: (last 7 days)     07/23/24  0804   INR 2.0*       ASSESSMENT     Source(s): Chart Review and Patient/Caregiver Call     Warfarin doses taken: Warfarin taken as instructed  Diet: eating more cucumbers  Medication/supplement changes: None noted  New illness, injury, or hospitalization: No  Signs or symptoms of bleeding or clotting: No  Previous result: Therapeutic last visit; previously outside of goal range  Additional findings: None and advised pt not to increase his vitamin K intake any further       PLAN     Recommended plan for no diet, medication or health factor changes affecting INR     Dosing Instructions: Continue your current warfarin dose with next INR in 6 weeks       Summary  As of 7/23/2024      Full warfarin instructions:  7.5 mg every Sat; 5 mg all other days   Next INR check:  9/3/2024               Telephone call with Jimi who verbalizes understanding and agrees to plan    Lab visit scheduled    Education provided: Please call back if any changes to your diet, medications or how you've been taking warfarin  Contact [phone] with any changes, questions or concerns.     Plan made per [facility] anticoagulation protocol    Angelina Vincent RN  Anticoagulation Clinic  7/23/2024    _______________________________________________________________________     Anticoagulation Episode Summary       Current INR goal:  2.0-3.0   TTR:  88.0% (1 y)   Target end date:  Indefinite   Send INR reminders to:  [facility] [place]    Indications    Atrial fibrillation with RVR (H) [I48.91]             Comments:               Anticoagulation Care Providers       Provider Role Specialty Phone number    Francia Finnegan NP Referring Nurse Practitioner - Family [phone]    Nikki Alarcon APRN CNP Referring Nurse Practitioner - Adult Health [phone]    Antonia  Hannah Monique MD Referring Family Medicine [phone]    Rodolfo Pedersen MD Referring Family Medicine [phone]

## 2024-07-23 NOTE — PROGRESS NOTES
ANTICOAGULATION CLINIC REFERRAL RENEWAL REQUEST       An annual renewal order is required for all patients referred to [facility].?  Please review and sign the pended referral order for Jimi Moreno.       ANTICOAGULATION SUMMARY      Warfarin indication(s)   Atrial Fibrillation    Mechanical heart valve present?  NO       Current goal range   INR: 2.0-3.0     Goal appropriate for indication? Goal INR 2-3, standard for indication(s) above     Time in Therapeutic Range (TTR)  (Goal > 60%) 88%       Office visit with referring provider's group within last year yes on 1-       Angelina Vincent RN  [facility]

## 2024-07-31 ENCOUNTER — TELEPHONE (OUTPATIENT)
Dept: FAMILY MEDICINE | Facility: CLINIC | Age: 79
End: 2024-07-31
Payer: COMMERCIAL

## 2024-07-31 NOTE — TELEPHONE ENCOUNTER
Spoke with pharmacist and provided verbal.     Thanks,  DONNA Aragon  [facility]

## 2024-07-31 NOTE — TELEPHONE ENCOUNTER
Pharmacy calling to state there is a shortage of Cardizem CD specifically.      Disp Refills Start End LYNETTE   diltiazem ER COATED BEADS (CARDIZEM CD) 360 MG 24 hr capsule 90 capsule 3 1/22/2024 -- No   Sig: TAKE 1 CAPSULE BY MOUTH ONCE DAILY . APPOINTMENT REQUIRED FOR FUTURE REFILLS   Sent to pharmacy as: dilTIAZem HCl ER Coated Beads 360 MG Oral Capsule Extended Release 24 Hour (CARDIZEM CD)   Class: E-Prescribe   Order: [id number]   E-Prescribing Status: Receipt confirmed by pharmacy (1/22/2024  8:41 AM CST)       Pharmacy has Diltiazem  generic is a different formulation in the extended release to the Cardizem. The medications are not directly interchangable but they do have this in stock if OK change due to supply.     OK to substitute?    Alyson Thomas, RN on 7/31/2024 at 11:32 AM

## 2024-09-03 ENCOUNTER — LAB (OUTPATIENT)
Dept: LAB | Facility: CLINIC | Age: 79
End: 2024-09-03
Payer: COMMERCIAL

## 2024-09-03 ENCOUNTER — ANTICOAGULATION THERAPY VISIT (OUTPATIENT)
Dept: ANTICOAGULATION | Facility: CLINIC | Age: 79
End: 2024-09-03

## 2024-09-03 DIAGNOSIS — I48.91 ATRIAL FIBRILLATION, UNSPECIFIED TYPE (H): ICD-10-CM

## 2024-09-03 DIAGNOSIS — I48.91 ATRIAL FIBRILLATION WITH RVR (H): Primary | ICD-10-CM

## 2024-09-03 LAB — INR BLD: 2.5 (ref 0.9–1.1)

## 2024-09-03 PROCEDURE — 36416 COLLJ CAPILLARY BLOOD SPEC: CPT

## 2024-09-03 PROCEDURE — 85610 PROTHROMBIN TIME: CPT

## 2024-09-03 NOTE — PROGRESS NOTES
ANTICOAGULATION MANAGEMENT     Jimi Moreno 79 year old male is on warfarin with therapeutic INR result. (Goal INR 2.0-3.0)    Recent labs: (last 7 days)     09/03/24  0948   INR 2.5*       ASSESSMENT     Source(s): Chart Review and Patient/Caregiver Call     Warfarin doses taken: Warfarin taken as instructed  Diet: No new diet changes identified  Medication/supplement changes: None noted  New illness, injury, or hospitalization: No  Signs or symptoms of bleeding or clotting: No  Previous result: Therapeutic last 2(+) visits  Additional findings: None       PLAN     Recommended plan for no diet, medication or health factor changes affecting INR     Dosing Instructions: Continue your current warfarin dose with next INR in 6 weeks       Summary  As of 9/3/2024      Full warfarin instructions:  7.5 mg every Sat; 5 mg all other days   Next INR check:  10/15/2024               Telephone call with Jimi who verbalizes understanding and agrees to plan    Lab visit scheduled    Education provided: Goal range and lab monitoring: goal range and significance of current result    Plan made per [facility] anticoagulation protocol    Aby Jung RN  Anticoagulation Clinic  9/3/2024    _______________________________________________________________________     Anticoagulation Episode Summary       Current INR goal:  2.0-3.0   TTR:  96.8% (1 y)   Target end date:  Indefinite   Send INR reminders to:  [facility]    Indications    Atrial fibrillation with RVR (H) [I48.91]  Atrial fibrillation  unspecified type (H) [I48.91]             Comments:               Anticoagulation Care Providers       Provider Role Specialty Phone number    Francia Finnegan NP Referring Nurse Practitioner - Family [phone]    Nikki Alarcon APRN CNP Referring Nurse Practitioner - [facility] [phone]    Hannah Knight MD Referring Family Medicine [phone]    Rodolfo Pedersen MD Referring Family Medicine  [phone]

## 2024-10-15 ENCOUNTER — ANTICOAGULATION THERAPY VISIT (OUTPATIENT)
Dept: ANTICOAGULATION | Facility: CLINIC | Age: 79
End: 2024-10-15

## 2024-10-15 ENCOUNTER — LAB (OUTPATIENT)
Dept: LAB | Facility: CLINIC | Age: 79
End: 2024-10-15
Payer: COMMERCIAL

## 2024-10-15 DIAGNOSIS — I48.91 ATRIAL FIBRILLATION, UNSPECIFIED TYPE (H): ICD-10-CM

## 2024-10-15 DIAGNOSIS — I48.91 ATRIAL FIBRILLATION WITH RVR (H): Primary | ICD-10-CM

## 2024-10-15 LAB — INR BLD: 1.6 (ref 0.9–1.1)

## 2024-10-15 PROCEDURE — 36416 COLLJ CAPILLARY BLOOD SPEC: CPT

## 2024-10-15 PROCEDURE — 85610 PROTHROMBIN TIME: CPT

## 2024-10-15 NOTE — PROGRESS NOTES
ANTICOAGULATION MANAGEMENT     Jimi Moreno 79 year old male is on warfarin with subtherapeutic INR result. (Goal INR 2.0-3.0)    Recent labs: (last 7 days)     10/15/24  0932   INR 1.6*       ASSESSMENT     Source(s): Chart Review and Patient/Caregiver Call     Warfarin doses taken: Missed dose(s) may be affecting INR   Diet: No new diet changes identified  Medication/supplement changes: None noted  New illness, injury, or hospitalization: No  Signs or symptoms of bleeding or clotting: No  Previous result: Therapeutic last 2(+) visits  Additional findings: None       PLAN     Recommended plan for no diet, medication or health factor changes affecting INR     Dosing Instructions: booster dose then continue your current warfarin dose with next INR in 2 weeks       Summary  As of 10/15/2024      Full warfarin instructions:  7.5 mg every Sat; 5 mg all other days   Next INR check:  10/29/2024               Telephone call with Jimi who verbalizes understanding and agrees to plan    Lab visit scheduled    Education provided: Goal range and lab monitoring: goal range and significance of current result    Plan made per [facility] anticoagulation protocol    Aby Jung RN  10/15/2024  Anticoagulation Clinic  [place] for routing messages: daniela HIRSCH  [facility] patient phone line: [phone]        _______________________________________________________________________     Anticoagulation Episode Summary       Current INR goal:  2.0-3.0   TTR:  91.7% (1 y)   Target end date:  Indefinite   Send INR reminders to:  [facility]    Indications    Atrial fibrillation with RVR (H) [I48.91]  Atrial fibrillation  unspecified type (H) [I48.91]             Comments:               Anticoagulation Care Providers       Provider Role Specialty Phone number    Francia Finnegan NP Referring Nurse Practitioner - Family [phone]    Nikki Alarcon APRN CNP Referring Nurse Practitioner - Adult Health [phone]     Hannah Knight MD Referring Family Medicine [phone]    Rodolfo Pedersen MD Referring Family Medicine [phone]

## 2024-10-29 ENCOUNTER — LAB (OUTPATIENT)
Dept: LAB | Facility: CLINIC | Age: 79
End: 2024-10-29
Payer: COMMERCIAL

## 2024-10-29 ENCOUNTER — ANTICOAGULATION THERAPY VISIT (OUTPATIENT)
Dept: ANTICOAGULATION | Facility: CLINIC | Age: 79
End: 2024-10-29

## 2024-10-29 DIAGNOSIS — I48.91 ATRIAL FIBRILLATION, UNSPECIFIED TYPE (H): ICD-10-CM

## 2024-10-29 DIAGNOSIS — I48.91 ATRIAL FIBRILLATION WITH RVR (H): Primary | ICD-10-CM

## 2024-10-29 LAB — INR BLD: 1.8 (ref 0.9–1.1)

## 2024-10-29 PROCEDURE — 36416 COLLJ CAPILLARY BLOOD SPEC: CPT

## 2024-10-29 PROCEDURE — 85610 PROTHROMBIN TIME: CPT

## 2024-10-29 NOTE — PROGRESS NOTES
Problem: Diabetes  Goal: Achieves glycemic balance  Description: Goal is to maintain blood sugar within range with no episodes of hypoglycemia  Outcome: Monitoring/Evaluating progress  Goal: Demonstrates ability to self-administer insulin  Description: Document on Patient Education Activity  Outcome: Monitoring/Evaluating progress     Problem: Skin Integrity Alteration  Goal: Skin remains intact with no new/deterioration of wound or pressure injury  Outcome: Monitoring/Evaluating progress  Goal: Participates in wound care activities  Outcome: Monitoring/Evaluating progress      ANTICOAGULATION MANAGEMENT     Jimi Moreno 79 year old male is on warfarin with subtherapeutic INR result. (Goal INR 2.0-3.0)    Recent labs: (last 7 days)     10/29/24  0926   INR 1.8*       ASSESSMENT     Source(s): Chart Review and Patient/Caregiver Call     Warfarin doses taken: Warfarin taken as instructed  Diet: No new diet changes identified  Medication/supplement changes: None noted  New illness, injury, or hospitalization: No  Signs or symptoms of bleeding or clotting: No  Previous result: Subtherapeutic  Additional findings: None       PLAN     Recommended plan for no diet, medication or health factor changes affecting INR     Dosing Instructions: Increase your warfarin dose (6.7% change) with next INR in 2 weeks       Summary  As of 10/29/2024      Full warfarin instructions:  7.5 mg every Sat; 5 mg all other days   Next INR check:  11/12/2024               Telephone call with Jimi who verbalizes understanding and agrees to plan    Lab visit scheduled    Education provided: Goal range and lab monitoring: goal range and significance of current result    Plan made per [facility] anticoagulation protocol    Aby Jung RN  10/29/2024  Anticoagulation Clinic  [place] for routing messages: daniela HIRSCH  [facility] patient phone line: [phone]        _______________________________________________________________________     Anticoagulation Episode Summary       Current INR goal:  2.0-3.0   TTR:  87.9% (1 y)   Target end date:  Indefinite   Send INR reminders to:  P[facility]ELA [facility]    Indications    Atrial fibrillation with RVR (H) [I48.91]  Atrial fibrillation  unspecified type (H) [I48.91]             Comments:  --             Anticoagulation Care Providers       Provider Role Specialty Phone number    Francia Finnegan NP Referring Nurse Practitioner - Family [phone]    Nikki Alarcon APRN CNP Referring Nurse Practitioner - Adult Health [phone]    Hannah Knight MD  Referring Family Medicine [phone]    Rodolfo Pedersen MD Referring Family Medicine [phone]

## 2024-11-12 ENCOUNTER — LAB (OUTPATIENT)
Dept: LAB | Facility: CLINIC | Age: 79
End: 2024-11-12
Payer: COMMERCIAL

## 2024-11-12 ENCOUNTER — ANTICOAGULATION THERAPY VISIT (OUTPATIENT)
Dept: ANTICOAGULATION | Facility: CLINIC | Age: 79
End: 2024-11-12

## 2024-11-12 DIAGNOSIS — I48.91 ATRIAL FIBRILLATION WITH RVR (H): Primary | ICD-10-CM

## 2024-11-12 DIAGNOSIS — I48.91 ATRIAL FIBRILLATION, UNSPECIFIED TYPE (H): ICD-10-CM

## 2024-11-12 LAB — INR BLD: 3.7 (ref 0.9–1.1)

## 2024-11-12 PROCEDURE — 85610 PROTHROMBIN TIME: CPT

## 2024-11-12 PROCEDURE — 36416 COLLJ CAPILLARY BLOOD SPEC: CPT

## 2024-11-12 NOTE — PROGRESS NOTES
ANTICOAGULATION MANAGEMENT     Jimi Moreno 79 year old male is on warfarin with supratherapeutic INR result. (Goal INR 2.0-3.0)    Recent labs: (last 7 days)     11/12/24  0946   INR 3.7*       ASSESSMENT     Source(s): Chart Review and Patient/Caregiver Call     Warfarin doses taken: Warfarin taken as instructed  Diet: No new diet changes identified  Medication/supplement changes: None noted  New illness, injury, or hospitalization: No  Signs or symptoms of bleeding or clotting: No  Previous result: Subtherapeutic  Additional findings: None       PLAN     Recommended plan for no diet, medication or health factor changes affecting INR     Dosing Instructions: decrease your warfarin dose (6.2% change) with next INR in 2 weeks       Summary  As of 11/12/2024      Full warfarin instructions:  7.5 mg every Sat; 5 mg all other days   Next INR check:  11/26/2024               Telephone call with Jimi who verbalizes understanding and agrees to plan    Lab visit scheduled    Education provided: Goal range and lab monitoring: goal range and significance of current result    Plan made per [facility] anticoagulation protocol    Aby Jung RN  11/12/2024  Anticoagulation Clinic  [employer] for routing messages: daniela HIRSCH  [facility] patient phone line: [phone]        _______________________________________________________________________     Anticoagulation Episode Summary       Current INR goal:  2.0-3.0   TTR:  86.1% (1 y)   Target end date:  Indefinite   Send INR reminders to:  PAMELA HIRSCH    Indications    Atrial fibrillation with RVR (H) [I48.91]  Atrial fibrillation  unspecified type (H) [I48.91]             Comments:  --             Anticoagulation Care Providers       Provider Role Specialty Phone number    Francia Finnegan NP Referring Nurse Practitioner - Family [phone]    Nikki Alarcon APRN CNP Referring Nurse Practitioner - Adult Health [phone]    Hannah Knight MD  Referring Family Medicine [phone]    Rodolfo Pedersen MD Referring Family Medicine [phone]

## 2024-11-26 ENCOUNTER — ANTICOAGULATION THERAPY VISIT (OUTPATIENT)
Dept: ANTICOAGULATION | Facility: CLINIC | Age: 79
End: 2024-11-26

## 2024-11-26 ENCOUNTER — LAB (OUTPATIENT)
Dept: LAB | Facility: CLINIC | Age: 79
End: 2024-11-26
Payer: COMMERCIAL

## 2024-11-26 DIAGNOSIS — I48.91 ATRIAL FIBRILLATION, UNSPECIFIED TYPE (H): ICD-10-CM

## 2024-11-26 DIAGNOSIS — I48.91 ATRIAL FIBRILLATION WITH RVR (H): Primary | ICD-10-CM

## 2024-11-26 LAB — INR BLD: 3 (ref 0.9–1.1)

## 2024-11-26 PROCEDURE — 85610 PROTHROMBIN TIME: CPT

## 2024-11-26 PROCEDURE — 36416 COLLJ CAPILLARY BLOOD SPEC: CPT

## 2024-11-26 NOTE — PROGRESS NOTES
ANTICOAGULATION MANAGEMENT     Jimi Moreno 79 year old male is on warfarin with therapeutic INR result. (Goal INR 2.0-3.0)    Recent labs: (last 7 days)     11/26/24  0948   INR 3.0*       ASSESSMENT     Source(s): Chart Review and Patient/Caregiver Call     Warfarin doses taken: Warfarin taken as instructed  Diet: No new diet changes identified  Medication/supplement changes: None noted  New illness, injury, or hospitalization: No  Signs or symptoms of bleeding or clotting: No  Previous result: Supratherapeutic  Additional findings: None       PLAN     Recommended plan for no diet, medication or health factor changes affecting INR     Dosing Instructions: Continue your current warfarin dose with next INR in 3 weeks       Summary  As of 11/26/2024      Full warfarin instructions:  7.5 mg every Sat; 5 mg all other days   Next INR check:  12/17/2024               Telephone call with Jimi who verbalizes understanding and agrees to plan and who agrees to plan and repeated back plan correctly    Lab visit scheduled    Education provided: Contact [phone] with any changes, questions or concerns.     Plan made per [facility] anticoagulation protocol    Cydney Crowley RN  11/26/2024  Anticoagulation Clinic  [employer] for routing messages: daniela HIRSCH  [facility] patient phone line: [phone]        _______________________________________________________________________     Anticoagulation Episode Summary       Current INR goal:  2.0-3.0   TTR:  82.2% (1 y)   Target end date:  Indefinite   Send INR reminders to:  PAMELA HIRSCH    Indications    Atrial fibrillation with RVR (H) [I48.91]  Atrial fibrillation  unspecified type (H) [I48.91]             Comments:  --             Anticoagulation Care Providers       Provider Role Specialty Phone number    Francia Finnegan NP Referring Nurse Practitioner - Family [phone]    Nikki Alarcon APRN CNP Referring Nurse Practitioner - [facility]  [phone]    Hannah Knight MD Referring Family Medicine [phone]    Rodolfo Pedersen MD Referring Family Medicine [phone]

## 2024-12-17 ENCOUNTER — LAB (OUTPATIENT)
Dept: LAB | Facility: CLINIC | Age: 79
End: 2024-12-17
Payer: COMMERCIAL

## 2024-12-17 ENCOUNTER — ANTICOAGULATION THERAPY VISIT (OUTPATIENT)
Dept: ANTICOAGULATION | Facility: CLINIC | Age: 79
End: 2024-12-17

## 2024-12-17 DIAGNOSIS — I48.91 ATRIAL FIBRILLATION, UNSPECIFIED TYPE (H): ICD-10-CM

## 2024-12-17 DIAGNOSIS — I48.91 ATRIAL FIBRILLATION WITH RVR (H): Primary | ICD-10-CM

## 2024-12-17 LAB — INR BLD: 2 (ref 0.9–1.1)

## 2024-12-17 PROCEDURE — 36416 COLLJ CAPILLARY BLOOD SPEC: CPT

## 2024-12-17 PROCEDURE — 85610 PROTHROMBIN TIME: CPT

## 2024-12-17 NOTE — PROGRESS NOTES
ANTICOAGULATION MANAGEMENT     Jimi Moreno 79 year old male is on warfarin with therapeutic INR result. (Goal INR 2.0-3.0)    Recent labs: (last 7 days)     12/17/24  0947   INR 2.0*       ASSESSMENT     Source(s): Chart Review and Patient/Caregiver Call     Warfarin doses taken: Warfarin taken as instructed  Diet: No new diet changes identified  Medication/supplement changes: None noted  New illness, injury, or hospitalization: No  Signs or symptoms of bleeding or clotting: No  Previous result: Therapeutic last visit; previously outside of goal range  Additional findings: None       PLAN     Recommended plan for no diet, medication or health factor changes affecting INR     Dosing Instructions: Continue your current warfarin dose with next INR in 4 weeks       Summary  As of 12/17/2024      Full warfarin instructions:  7.5 mg every Sat; 5 mg all other days   Next INR check:  1/14/2025               Telephone call with Jimi who verbalizes understanding and agrees to plan and who agrees to plan and repeated back plan correctly    Lab visit scheduled    Education provided: None required    Plan made per [facility] anticoagulation protocol    Josselin Youssef RN  12/17/2024  Anticoagulation Clinic  [employer] for routing messages: daniela HIRSCH  [facility] patient phone line: [phone]        _______________________________________________________________________     Anticoagulation Episode Summary       Current INR goal:  2.0-3.0   TTR:  82.2% (1 y)   Target end date:  Indefinite   Send INR reminders to:  PAMELA HIRSCH    Indications    Atrial fibrillation with RVR (H) [I48.91]  Atrial fibrillation  unspecified type (H) [I48.91]             Comments:  --             Anticoagulation Care Providers       Provider Role Specialty Phone number    Francia Finnegan NP Referring Nurse Practitioner - Family [phone]    Rodolfo Pedersen MD [facility] [phone]

## 2024-12-23 ENCOUNTER — PATIENT OUTREACH (OUTPATIENT)
Dept: CARE COORDINATION | Facility: CLINIC | Age: 79
End: 2024-12-23
Payer: COMMERCIAL

## 2025-01-06 ENCOUNTER — PATIENT OUTREACH (OUTPATIENT)
Dept: CARE COORDINATION | Facility: CLINIC | Age: 80
End: 2025-01-06
Payer: COMMERCIAL

## 2025-01-14 ENCOUNTER — ANTICOAGULATION THERAPY VISIT (OUTPATIENT)
Dept: ANTICOAGULATION | Facility: CLINIC | Age: 80
End: 2025-01-14

## 2025-01-14 ENCOUNTER — LAB (OUTPATIENT)
Dept: LAB | Facility: CLINIC | Age: 80
End: 2025-01-14
Payer: COMMERCIAL

## 2025-01-14 DIAGNOSIS — I48.91 ATRIAL FIBRILLATION, UNSPECIFIED TYPE (H): ICD-10-CM

## 2025-01-14 DIAGNOSIS — I48.91 ATRIAL FIBRILLATION WITH RVR (H): Primary | ICD-10-CM

## 2025-01-14 LAB — INR BLD: 2 (ref 0.9–1.1)

## 2025-01-14 PROCEDURE — 36415 COLL VENOUS BLD VENIPUNCTURE: CPT

## 2025-01-14 PROCEDURE — 85610 PROTHROMBIN TIME: CPT

## 2025-01-14 NOTE — PROGRESS NOTES
ANTICOAGULATION MANAGEMENT     Jimi Moreno 80 year old male is on warfarin with therapeutic INR result. (Goal INR 2.0-3.0)    Recent labs: (last 7 days)     01/14/25  0950   INR 2.0*       ASSESSMENT     Source(s): Chart Review and Patient/Caregiver Call     Warfarin doses taken: Warfarin taken as instructed  Diet: No new diet changes identified  Medication/supplement changes: None noted  New illness, injury, or hospitalization: No  Signs or symptoms of bleeding or clotting: No  Previous result: Therapeutic last 2(+) visits  Additional findings: None       PLAN     Recommended plan for no diet, medication or health factor changes affecting INR     Dosing Instructions: Continue your current warfarin dose with next INR in 5 weeks       Summary  As of 1/14/2025      Full warfarin instructions:  7.5 mg every Sat; 5 mg all other days   Next INR check:  2/18/2025               Telephone call with Jimi who verbalizes understanding and agrees to plan    Lab visit scheduled    Education provided: Goal range and lab monitoring: goal range and significance of current result    Plan made per [facility] anticoagulation protocol    Aby Jung RN  1/14/2025  Anticoagulation Clinic  [employer] for routing messages: daniela HIRSCH  [facility] patient phone line: [phone]        _______________________________________________________________________     Anticoagulation Episode Summary       Current INR goal:  2.0-3.0   TTR:  82.2% (1 y)   Target end date:  Indefinite   Send INR reminders to:  PAMELA HIRSCH    Indications    Atrial fibrillation with RVR (H) [I48.91]  Atrial fibrillation  unspecified type (H) [I48.91]             Comments:  --             Anticoagulation Care Providers       Provider Role Specialty Phone number    Francia Finnegan NP Referring Nurse Practitioner - Family [phone]    Rodolfo Pedersen MD [facility] [phone]

## 2025-01-18 DIAGNOSIS — N40.1 BENIGN PROSTATIC HYPERPLASIA WITH LOWER URINARY TRACT SYMPTOMS, SYMPTOM DETAILS UNSPECIFIED: ICD-10-CM

## 2025-01-18 DIAGNOSIS — I48.20 CHRONIC ATRIAL FIBRILLATION (H): ICD-10-CM

## 2025-01-19 RX ORDER — METOPROLOL SUCCINATE 50 MG/1
50 TABLET, EXTENDED RELEASE ORAL DAILY
Qty: 90 TABLET | Refills: 0 | Status: SHIPPED | OUTPATIENT
Start: 2025-01-19

## 2025-01-19 RX ORDER — TAMSULOSIN HYDROCHLORIDE 0.4 MG/1
CAPSULE ORAL
Qty: 180 CAPSULE | Refills: 0 | Status: SHIPPED | OUTPATIENT
Start: 2025-01-19

## 2025-01-21 NOTE — TELEPHONE ENCOUNTER
1st attempt: Called patient. Phone keep ringing. Unable to leave voicemail.    Shannon Castelan, [facility]

## 2025-01-30 DIAGNOSIS — I48.91 ATRIAL FIBRILLATION WITH RVR (H): ICD-10-CM

## 2025-01-30 RX ORDER — WARFARIN SODIUM 5 MG/1
TABLET ORAL
Qty: 100 TABLET | Refills: 1 | Status: SHIPPED | OUTPATIENT
Start: 2025-01-30

## 2025-01-30 NOTE — TELEPHONE ENCOUNTER
ANTICOAGULATION MANAGEMENT:  Medication Refill    Anticoagulation Summary  As of 1/14/2025      Warfarin maintenance plan:  7.5 mg (5 mg x 1.5) every Sat; 5 mg (5 mg x 1) all other days   Next INR check:  2/18/2025   Target end date:  Indefinite    Indications    Atrial fibrillation with RVR (H) [I48.91]  Atrial fibrillation  unspecified type (H) [I48.91]                 Anticoagulation Care Providers       Provider Role Specialty Phone number    Francia Finnegan NP Referring Nurse Practitioner - Family [phone]    Rodolfo Pedersen MD [facility] Medicine [phone]            Refill Criteria    Visit with referring provider/group: Meets criteria: visit within referring provider group in the last 15 months on 7/29/24    [facility] referral last signed: 07/23/2024; within last year:  Yes    Lab monitoring is up to date (not exceeding 2 weeks overdue): Yes    Jimi meets all criteria for refill. Rx instructions and quantity supplied updated to match patient's current dosing plan. Warfarin 90 day supply with 1 refill granted per [facility] protocol     Cydney Crowley, RN  Anticoagulation Clinic

## 2025-02-18 ENCOUNTER — ANTICOAGULATION THERAPY VISIT (OUTPATIENT)
Dept: ANTICOAGULATION | Facility: CLINIC | Age: 80
End: 2025-02-18

## 2025-02-18 ENCOUNTER — LAB (OUTPATIENT)
Dept: LAB | Facility: CLINIC | Age: 80
End: 2025-02-18
Payer: COMMERCIAL

## 2025-02-18 DIAGNOSIS — I48.91 ATRIAL FIBRILLATION, UNSPECIFIED TYPE (H): ICD-10-CM

## 2025-02-18 DIAGNOSIS — I48.91 ATRIAL FIBRILLATION WITH RVR (H): Primary | ICD-10-CM

## 2025-02-18 LAB — INR BLD: 2.2 (ref 0.9–1.1)

## 2025-02-18 PROCEDURE — 36416 COLLJ CAPILLARY BLOOD SPEC: CPT

## 2025-02-18 PROCEDURE — 85610 PROTHROMBIN TIME: CPT

## 2025-02-18 NOTE — PROGRESS NOTES
ANTICOAGULATION MANAGEMENT     Jimi Moreno 80 year old male is on warfarin with therapeutic INR result. (Goal INR 2.0-3.0)    Recent labs: (last 7 days)     02/18/25  1003   INR 2.2*       ASSESSMENT     Source(s): Chart Review and Patient/Caregiver Call     Warfarin doses taken: Warfarin taken as instructed  Diet: No new diet changes identified  Medication/supplement changes: None noted  New illness, injury, or hospitalization: No  Signs or symptoms of bleeding or clotting: No  Previous result: Therapeutic last 2(+) visits  Additional findings: None       PLAN     Recommended plan for no diet, medication or health factor changes affecting INR     Dosing Instructions: Continue your current warfarin dose with next INR in 6 weeks       Summary  As of 2/18/2025      Full warfarin instructions:  7.5 mg every Sat; 5 mg all other days   Next INR check:  4/1/2025               Telephone call with Jimi who verbalizes understanding and agrees to plan and who agrees to plan and repeated back plan correctly    Lab visit scheduled    Education provided: Contact [phone] with any changes, questions or concerns.     Plan made per [facility] anticoagulation protocol    Cydney Crowley RN  2/18/2025  Anticoagulation Clinic  [employer] for routing messages: daniela HIRSCH  [facility] patient phone line: [phone]        _______________________________________________________________________     Anticoagulation Episode Summary       Current INR goal:  2.0-3.0   TTR:  82.2% (1 y)   Target end date:  Indefinite   Send INR reminders to:  PAMELA HIRSCH    Indications    Atrial fibrillation with RVR (H) [I48.91]  Atrial fibrillation  unspecified type (H) [I48.91]             Comments:  --             Anticoagulation Care Providers       Provider Role Specialty Phone number    Francia Finnegan NP Referring Nurse Practitioner - Family [phone]    Rodolfo Pedersen MD [facility] [phone]

## 2025-03-08 ENCOUNTER — HEALTH MAINTENANCE LETTER (OUTPATIENT)
Age: 80
End: 2025-03-08

## 2025-04-01 ENCOUNTER — TELEPHONE (OUTPATIENT)
Dept: FAMILY MEDICINE | Facility: CLINIC | Age: 80
End: 2025-04-01

## 2025-04-01 ENCOUNTER — LAB (OUTPATIENT)
Dept: LAB | Facility: CLINIC | Age: 80
End: 2025-04-01
Payer: COMMERCIAL

## 2025-04-01 ENCOUNTER — ANTICOAGULATION THERAPY VISIT (OUTPATIENT)
Dept: ANTICOAGULATION | Facility: CLINIC | Age: 80
End: 2025-04-01

## 2025-04-01 DIAGNOSIS — I48.91 ATRIAL FIBRILLATION, UNSPECIFIED TYPE (H): ICD-10-CM

## 2025-04-01 DIAGNOSIS — I48.91 ATRIAL FIBRILLATION WITH RVR (H): Primary | ICD-10-CM

## 2025-04-01 LAB — INR BLD: 2.7 (ref 0.9–1.1)

## 2025-04-01 PROCEDURE — 36416 COLLJ CAPILLARY BLOOD SPEC: CPT

## 2025-04-01 PROCEDURE — 85610 PROTHROMBIN TIME: CPT

## 2025-04-01 NOTE — PROGRESS NOTES
ANTICOAGULATION MANAGEMENT     Jimi Moreno 80 year old male is on warfarin with therapeutic INR result. (Goal INR 2.0-3.0)    Recent labs: (last 7 days)     04/01/25  0949   INR 2.7*       ASSESSMENT     Source(s): Chart Review and Patient/Caregiver Call     Warfarin doses taken: Warfarin taken as instructed  Diet: No new diet changes identified  Medication/supplement changes: None noted  New illness, injury, or hospitalization: No  Signs or symptoms of bleeding or clotting: No  Previous result: Therapeutic last 2(+) visits  Additional findings: None     PLAN     Recommended plan for no diet, medication or health factor changes affecting INR     Dosing Instructions: Continue your current warfarin dose with next INR in 6 weeks       Summary  As of 4/1/2025      Full warfarin instructions:  7.5 mg every Sat; 5 mg all other days   Next INR check:  5/13/2025               Telephone call with Jimi who verbalizes understanding and agrees to plan    Lab visit scheduled    Education provided: Please call back if any changes to your diet, medications or how you've been taking warfarin    Plan made per [facility] anticoagulation protocol    Krystle Marin RN  4/1/2025  Anticoagulation Clinic  [employer] for routing messages: daniela HIRSCH  [facility] patient phone line: [phone]        _______________________________________________________________________     Anticoagulation Episode Summary       Current INR goal:  2.0-3.0   TTR:  82.2% (1 y)   Target end date:  Indefinite   Send INR reminders to:  PAMELA HIRSCH    Indications    Atrial fibrillation with RVR (H) [I48.91]  Atrial fibrillation  unspecified type (H) [I48.91]             Comments:  --             Anticoagulation Care Providers       Provider Role Specialty Phone number    Francia Finnegan NP Referring Nurse Practitioner - Family [phone]    Rodolfo Pedersen MD [facility] [phone]

## 2025-04-01 NOTE — TELEPHONE ENCOUNTER
Spoke with patient about scheduling AWV, pt states he will call back once he figures out how to bring spouse to AWV as well. Please schedule together per pt request

## 2025-05-05 ENCOUNTER — ANTICOAGULATION THERAPY VISIT (OUTPATIENT)
Dept: ANTICOAGULATION | Facility: CLINIC | Age: 80
End: 2025-05-05

## 2025-05-05 ENCOUNTER — LAB (OUTPATIENT)
Dept: LAB | Facility: CLINIC | Age: 80
End: 2025-05-05
Payer: COMMERCIAL

## 2025-05-05 DIAGNOSIS — I48.91 ATRIAL FIBRILLATION, UNSPECIFIED TYPE (H): ICD-10-CM

## 2025-05-05 DIAGNOSIS — I48.91 ATRIAL FIBRILLATION WITH RVR (H): Primary | ICD-10-CM

## 2025-05-05 LAB — INR BLD: 1.8 (ref 0.9–1.1)

## 2025-05-05 PROCEDURE — 36416 COLLJ CAPILLARY BLOOD SPEC: CPT

## 2025-05-05 PROCEDURE — 85610 PROTHROMBIN TIME: CPT

## 2025-05-05 NOTE — PROGRESS NOTES
ANTICOAGULATION MANAGEMENT     Jimi Moreno 80 year old male is on warfarin with subtherapeutic INR result. (Goal INR 2.0-3.0)    Recent labs: (last 7 days)     05/05/25  0945   INR 1.8*       ASSESSMENT     Source(s): Chart Review and Patient/Caregiver Call     Warfarin doses taken: Missed dose(s) may be affecting INR - possible missed dose last week but unsure of the day  Diet: No new diet changes identified  Medication/supplement changes: None noted  New illness, injury, or hospitalization: No  Signs or symptoms of bleeding or clotting: No  Previous result: Therapeutic last 2(+) visits  Additional findings:  Did have one tooth pulled today.        PLAN     Recommended plan for temporary change(s) affecting INR     Dosing Instructions: Continue your current warfarin dose with next INR in 2 weeks       Summary  As of 5/5/2025      Full warfarin instructions:  7.5 mg every Sat; 5 mg all other days   Next INR check:  5/19/2025               Telephone call with Jimi who verbalizes understanding and agrees to plan and who agrees to plan and repeated back plan correctly    Lab visit scheduled    Education provided: Goal range and lab monitoring: goal range and significance of current result, Importance of therapeutic range, and Importance of following up at instructed interval  Symptom monitoring: monitoring for bleeding signs and symptoms, monitoring for clotting signs and symptoms, and when to seek medical attention/emergency care  Contact [phone] with any changes, questions or concerns.     Plan made per [facility] anticoagulation protocol    Leticia Patel RN  5/5/2025  Anticoagulation Clinic  [employer] for routing messages: daniela HIRSCH  [facility] patient phone line: [phone]        _______________________________________________________________________     Anticoagulation Episode Summary       Current INR goal:  2.0-3.0   TTR:  80.2% (1 y)   Target end date:  Indefinite   Send INR reminders to:  PAMELA  FRIDLEY    Indications    Atrial fibrillation with RVR (H) [I48.91]  Atrial fibrillation  unspecified type (H) [I48.91]             Comments:  --             Anticoagulation Care Providers       Provider Role Specialty Phone number    Francia Finnegan APRN CNP Referring Family Medicine [phone]    Rodolfo Pedersen MD [facility] Family Medicine [phone]

## 2025-05-15 NOTE — RESULT ENCOUNTER NOTE
"The hemoglobin a1c is barely into the \"prediabetes\" range.  No medications needed  Just keep working on healthy diet/exercise.    Other labs are all fine.    Rodolfo Pedersen MD  " [History reviewed] : History reviewed. [Medications and Allergies reviewed] : Medications and allergies reviewed.

## 2025-05-19 ENCOUNTER — RESULTS FOLLOW-UP (OUTPATIENT)
Dept: ANTICOAGULATION | Facility: CLINIC | Age: 80
End: 2025-05-19

## 2025-05-19 ENCOUNTER — LAB (OUTPATIENT)
Dept: LAB | Facility: CLINIC | Age: 80
End: 2025-05-19
Payer: COMMERCIAL

## 2025-05-19 ENCOUNTER — ANTICOAGULATION THERAPY VISIT (OUTPATIENT)
Dept: ANTICOAGULATION | Facility: CLINIC | Age: 80
End: 2025-05-19

## 2025-05-19 DIAGNOSIS — I48.91 ATRIAL FIBRILLATION WITH RVR (H): Primary | ICD-10-CM

## 2025-05-19 DIAGNOSIS — I48.91 ATRIAL FIBRILLATION, UNSPECIFIED TYPE (H): ICD-10-CM

## 2025-05-19 LAB — INR BLD: 2.4 (ref 0.9–1.1)

## 2025-05-19 PROCEDURE — 85610 PROTHROMBIN TIME: CPT

## 2025-05-19 PROCEDURE — 36416 COLLJ CAPILLARY BLOOD SPEC: CPT

## 2025-05-19 NOTE — PROGRESS NOTES
ANTICOAGULATION MANAGEMENT     Jimi Moreno 80 year old male is on warfarin with therapeutic INR result. (Goal INR 2.0-3.0)    Recent labs: (last 7 days)     05/19/25  0841   INR 2.4*       ASSESSMENT     Source(s): Chart Review and Patient/Caregiver Call     Warfarin doses taken: Warfarin taken as instructed  Diet: No new diet changes identified  Medication/supplement changes: None noted  New illness, injury, or hospitalization: No  Signs or symptoms of bleeding or clotting: No  Previous result: Subtherapeutic  Additional findings: None       PLAN     Recommended plan for no diet, medication or health factor changes affecting INR     Dosing Instructions: Continue your current warfarin dose with next INR in 3 weeks       Summary  As of 5/19/2025      Full warfarin instructions:  7.5 mg every Sat; 5 mg all other days   Next INR check:  6/9/2025               Telephone call with Jimi who verbalizes understanding and agrees to plan    Lab visit scheduled    Education provided: Please call back if any changes to your diet, medications or how you've been taking warfarin    Plan made per [facility] anticoagulation protocol    Hedy Maynard RN  5/19/2025  Anticoagulation Clinic  [employer] for routing messages: daniela HIRSCH  [facility] patient phone line: [phone]        _______________________________________________________________________     Anticoagulation Episode Summary       Current INR goal:  2.0-3.0   TTR:  78.9% (1 y)   Target end date:  Indefinite   Send INR reminders to:  PAMELA HIRSCH    Indications    Atrial fibrillation with RVR (H) [I48.91]  Atrial fibrillation  unspecified type (H) [I48.91]             Comments:  --             Anticoagulation Care Providers       Provider Role Specialty Phone number    Francia Finnegan, PANCHO CNP Referring Family Medicine [phone]    Rodolfo Pedersen MD Responsible Family Medicine [phone]

## 2025-05-19 NOTE — PROGRESS NOTES
ANTICOAGULATION MANAGEMENT     Jimi Moreno 80 year old male is on warfarin with therapeutic INR result. (Goal INR 2.0-3.0)    Recent labs: (last 7 days)     05/19/25  0841   INR 2.4*       ASSESSMENT     Source(s): Chart Review  Previous INR was Subtherapeutic  Medication, diet, health changes since last INR chart reviewed; none identified         PLAN     Unable to reach pt today.    No instructions provided. Unable to leave voicemail.    Follow up required to confirm warfarin dose taken and assess for changes    Hedy Maynard RN  5/19/2025  Anticoagulation Clinic  [facility] for routing messages: daniela HIRSCH  [facility] patient phone line: [phone]

## 2025-06-04 PROBLEM — I48.91 ATRIAL FIBRILLATION, UNSPECIFIED TYPE (H): Status: RESOLVED | Noted: 2023-08-01 | Resolved: 2025-06-04

## 2025-06-04 PROBLEM — A41.9 SEPSIS (H): Status: RESOLVED | Noted: 2020-07-08 | Resolved: 2025-06-04

## 2025-06-04 PROBLEM — R79.89 ELEVATED D-DIMER: Status: RESOLVED | Noted: 2020-07-08 | Resolved: 2025-06-04

## 2025-06-04 PROBLEM — Z20.822 PERSON UNDER INVESTIGATION FOR COVID-19: Status: RESOLVED | Noted: 2020-07-08 | Resolved: 2025-06-04

## 2025-06-04 PROBLEM — K40.90 LEFT INGUINAL HERNIA: Status: RESOLVED | Noted: 2022-12-23 | Resolved: 2025-06-04

## 2025-06-04 PROBLEM — J18.9 PNEUMONIA OF RIGHT LOWER LOBE DUE TO INFECTIOUS ORGANISM: Status: RESOLVED | Noted: 2020-07-08 | Resolved: 2025-06-04

## 2025-06-09 ENCOUNTER — ANTICOAGULATION THERAPY VISIT (OUTPATIENT)
Dept: ANTICOAGULATION | Facility: CLINIC | Age: 80
End: 2025-06-09

## 2025-06-09 ENCOUNTER — DOCUMENTATION ONLY (OUTPATIENT)
Dept: ANTICOAGULATION | Facility: CLINIC | Age: 80
End: 2025-06-09

## 2025-06-09 ENCOUNTER — RESULTS FOLLOW-UP (OUTPATIENT)
Dept: ANTICOAGULATION | Facility: CLINIC | Age: 80
End: 2025-06-09

## 2025-06-09 ENCOUNTER — LAB (OUTPATIENT)
Dept: LAB | Facility: CLINIC | Age: 80
End: 2025-06-09
Payer: COMMERCIAL

## 2025-06-09 DIAGNOSIS — I48.91 ATRIAL FIBRILLATION WITH RVR (H): ICD-10-CM

## 2025-06-09 DIAGNOSIS — I48.91 ATRIAL FIBRILLATION WITH RVR (H): Primary | ICD-10-CM

## 2025-06-09 DIAGNOSIS — I48.91 ATRIAL FIBRILLATION, UNSPECIFIED TYPE (H): ICD-10-CM

## 2025-06-09 DIAGNOSIS — I48.91 ATRIAL FIBRILLATION, UNSPECIFIED TYPE (H): Primary | ICD-10-CM

## 2025-06-09 LAB — INR BLD: 2 (ref 0.9–1.1)

## 2025-06-09 PROCEDURE — 36415 COLL VENOUS BLD VENIPUNCTURE: CPT

## 2025-06-09 PROCEDURE — 85610 PROTHROMBIN TIME: CPT

## 2025-06-09 NOTE — CONFIDENTIAL NOTE
ANTICOAGULATION CLINIC REFERRAL RENEWAL REQUEST       An annual renewal order is required for all patients referred to [facility] Anticoagulation [facility].?  Please review and sign the pended referral order for Jimi Moreno.       ANTICOAGULATION SUMMARY      Warfarin indication(s)   Atrial Fibrillation    Mechanical heart valve present?  NO       Current goal range   INR: 2.0-3.0     Goal appropriate for indication? Goal INR 2-3, standard for indication(s) above     Time in Therapeutic Range (TTR)  (Goal > 60%) 82.1%       Office visit with referring provider's group within last year yes on 7/29/24       Krystle Marin RN  [facility]ticoagulation [facility]

## 2025-06-09 NOTE — PROGRESS NOTES
ANTICOAGULATION MANAGEMENT     Jimi Moreno 80 year old male is on warfarin with therapeutic INR result. (Goal INR 2.0-3.0)    Recent labs: (last 7 days)     06/09/25  0845   INR 2.0*       ASSESSMENT     Source(s): Chart Review and Patient/Caregiver Call     Warfarin doses taken: Missed dose(s) may be affecting INR  Diet: No new diet changes identified  Medication/supplement changes: None noted  New illness, injury, or hospitalization: No  Signs or symptoms of bleeding or clotting: No  Previous result: Therapeutic last visit; previously outside of goal range  Additional findings: None     PLAN     Recommended plan for temporary change(s) affecting INR     Dosing Instructions: Continue your current warfarin dose with next INR in 2 weeks       Summary  As of 6/9/2025      Full warfarin instructions:  7.5 mg every Sat; 5 mg all other days   Next INR check:  6/23/2025               Telephone call with Jimi who verbalizes understanding and agrees to plan    Lab visit scheduled    Education provided: Please call back if any changes to your diet, medications or how you've been taking warfarin  Symptom monitoring: monitoring for clotting signs and symptoms, monitoring for stroke signs and symptoms, and when to seek medical attention/emergency care    Plan made per [facility] anticoagulation protocol    Krystle Marin RN  6/9/2025  Anticoagulation Clinic  [employer] for routing messages: daniela HIRSCH  [facility] patient phone line: [phone]        _______________________________________________________________________     Anticoagulation Episode Summary       Current INR goal:  2.0-3.0   TTR:  82.1% (1 y)   Target end date:  Indefinite   Send INR reminders to:  PAMELA HIRSCH    Indications    Atrial fibrillation with RVR (H) [I48.91]  Atrial fibrillation  unspecified type (H) (Resolved) [I48.91]             Comments:  --             Anticoagulation Care Providers       Provider Role Specialty Phone number    Francia Finnegan  PANCHO PALMA CNP Referring Family Medicine [phone]    Rodolfo Pedersen MD [facility] Family Medicine [phone]

## 2025-06-23 ENCOUNTER — RESULTS FOLLOW-UP (OUTPATIENT)
Dept: ANTICOAGULATION | Facility: CLINIC | Age: 80
End: 2025-06-23

## 2025-06-23 ENCOUNTER — ANTICOAGULATION THERAPY VISIT (OUTPATIENT)
Dept: ANTICOAGULATION | Facility: CLINIC | Age: 80
End: 2025-06-23

## 2025-06-23 ENCOUNTER — LAB (OUTPATIENT)
Dept: LAB | Facility: CLINIC | Age: 80
End: 2025-06-23
Payer: COMMERCIAL

## 2025-06-23 DIAGNOSIS — I48.91 ATRIAL FIBRILLATION WITH RVR (H): ICD-10-CM

## 2025-06-23 DIAGNOSIS — I48.91 ATRIAL FIBRILLATION, UNSPECIFIED TYPE (H): ICD-10-CM

## 2025-06-23 DIAGNOSIS — I48.91 ATRIAL FIBRILLATION WITH RVR (H): Primary | ICD-10-CM

## 2025-06-23 LAB — INR BLD: 1.9 (ref 0.9–1.1)

## 2025-06-23 PROCEDURE — 85610 PROTHROMBIN TIME: CPT

## 2025-06-23 PROCEDURE — 36416 COLLJ CAPILLARY BLOOD SPEC: CPT

## 2025-06-23 NOTE — PROGRESS NOTES
ANTICOAGULATION MANAGEMENT     Jimi Moreno 80 year old male is on warfarin with subtherapeutic INR result. (Goal INR 2.0-3.0)    Recent labs: (last 7 days)     06/23/25  0933   INR 1.9*       ASSESSMENT     Source(s): Chart Review and Patient/Caregiver Call     Warfarin doses taken: Warfarin taken as instructed  Diet: No new diet changes identified  Medication/supplement changes: None noted  New illness, injury, or hospitalization: No  Signs or symptoms of bleeding or clotting: No  Previous result: Therapeutic last 2(+) visits- last INR at 2.0  Additional findings: None       PLAN     Recommended plan for no diet, medication or health factor changes affecting INR     Dosing Instructions: Increase your warfarin dose (6.7% change) with next INR in 2 weeks       Summary  As of 6/23/2025      Full warfarin instructions:  7.5 mg every Tue, Sat; 5 mg all other days   Next INR check:  7/7/2025               Telephone call with Jimi who verbalizes understanding and agrees to plan and who agrees to plan and repeated back plan correctly    Lab visit scheduled    Education provided: Contact [phone] with any changes, questions or concerns.     Plan made per [facility] anticoagulation protocol    Cydney Crowley, RN  6/23/2025  Anticoagulation Clinic  [employer] for routing messages: daniela HIRSCH  [facility] patient phone line: [phone]        _______________________________________________________________________     Anticoagulation Episode Summary       Current INR goal:  2.0-3.0   TTR:  78.2% (1 y)   Target end date:  Indefinite   Send INR reminders to:  PAMELA HIRSCH    Indications    Atrial fibrillation with RVR (H) [I48.91]  Atrial fibrillation  unspecified type (H) (Resolved) [I48.91]  Atrial fibrillation  unspecified type (H) [I48.91]             Comments:  --             Anticoagulation Care Providers       Provider Role Specialty Phone number    Francia Finnegan, APRN CNP Referring Family Medicine  [phone]    Rodolfo Pedersen MD [facility] [phone]

## 2025-07-07 ENCOUNTER — ANTICOAGULATION THERAPY VISIT (OUTPATIENT)
Dept: ANTICOAGULATION | Facility: CLINIC | Age: 80
End: 2025-07-07

## 2025-07-07 ENCOUNTER — LAB (OUTPATIENT)
Dept: LAB | Facility: CLINIC | Age: 80
End: 2025-07-07
Payer: COMMERCIAL

## 2025-07-07 DIAGNOSIS — I48.91 ATRIAL FIBRILLATION WITH RVR (H): ICD-10-CM

## 2025-07-07 DIAGNOSIS — I48.91 ATRIAL FIBRILLATION WITH RVR (H): Primary | ICD-10-CM

## 2025-07-07 DIAGNOSIS — I48.91 ATRIAL FIBRILLATION, UNSPECIFIED TYPE (H): ICD-10-CM

## 2025-07-07 LAB — INR BLD: 2.9 (ref 0.9–1.1)

## 2025-07-07 PROCEDURE — 85610 PROTHROMBIN TIME: CPT

## 2025-07-07 PROCEDURE — 36416 COLLJ CAPILLARY BLOOD SPEC: CPT

## 2025-07-07 NOTE — PROGRESS NOTES
ANTICOAGULATION MANAGEMENT     Jimi Moreno 80 year old male is on warfarin with therapeutic INR result. (Goal INR 2.0-3.0)    Recent labs: (last 7 days)     07/07/25  0950   INR 2.9*       ASSESSMENT     Source(s): Chart Review and Patient/Caregiver Call     Warfarin doses taken: Warfarin taken as instructed  Diet: No new diet changes identified  Medication/supplement changes: None noted  New illness, injury, or hospitalization: No  Signs or symptoms of bleeding or clotting: No  Previous result: Subtherapeutic  Additional findings: None     PLAN     Recommended plan for no diet, medication or health factor changes affecting INR     Dosing Instructions: Continue your current warfarin dose with next INR in 3 weeks       Summary  As of 7/7/2025      Full warfarin instructions:  7.5 mg every Tue, Sat; 5 mg all other days   Next INR check:  7/28/2025               Telephone call with Jimi who verbalizes understanding and agrees to plan    Lab visit scheduled    Education provided: Please call back if any changes to your diet, medications or how you've been taking warfarin    Plan made per [facility] anticoagulation protocol    Krystle Marin RN  7/7/2025  Anticoagulation Clinic  [employer] for routing messages: daniela HIRSCH  [facility] patient phone line: [phone]        _______________________________________________________________________     Anticoagulation Episode Summary       Current INR goal:  2.0-3.0   TTR:  77.8% (1 y)   Target end date:  Indefinite   Send INR reminders to:  PAMELA HIRSCH    Indications    Atrial fibrillation with RVR (H) [I48.91]  Atrial fibrillation  unspecified type (H) (Resolved) [I48.91]  Atrial fibrillation  unspecified type (H) [I48.91]             Comments:  --             Anticoagulation Care Providers       Provider Role Specialty Phone number    Francia Finnegan, PANCHO CNP Referring Family Medicine [phone]    Rodolfo Pedersen MD Referring Family Medicine [phone]

## 2025-07-12 DIAGNOSIS — I48.20 CHRONIC ATRIAL FIBRILLATION (H): ICD-10-CM

## 2025-07-12 DIAGNOSIS — N40.1 BENIGN PROSTATIC HYPERPLASIA WITH LOWER URINARY TRACT SYMPTOMS, SYMPTOM DETAILS UNSPECIFIED: ICD-10-CM

## 2025-07-14 RX ORDER — TAMSULOSIN HYDROCHLORIDE 0.4 MG/1
CAPSULE ORAL
Qty: 180 CAPSULE | Refills: 0 | Status: SHIPPED | OUTPATIENT
Start: 2025-07-14

## 2025-07-14 RX ORDER — METOPROLOL SUCCINATE 50 MG/1
50 TABLET, EXTENDED RELEASE ORAL DAILY
Qty: 90 TABLET | Refills: 0 | Status: SHIPPED | OUTPATIENT
Start: 2025-07-14

## 2025-07-15 NOTE — TELEPHONE ENCOUNTER
Patient called and informed.  Appointment scheduled with Dr. Pedersen on Thursday, July 24th at 7:40 a.m.  Roxanna Hairston,

## 2025-07-21 ENCOUNTER — PATIENT OUTREACH (OUTPATIENT)
Dept: CARE COORDINATION | Facility: CLINIC | Age: 80
End: 2025-07-21
Payer: COMMERCIAL

## 2025-07-24 ENCOUNTER — OFFICE VISIT (OUTPATIENT)
Dept: FAMILY MEDICINE | Facility: CLINIC | Age: 80
End: 2025-07-24
Payer: COMMERCIAL

## 2025-07-24 VITALS
BODY MASS INDEX: 25.81 KG/M2 | TEMPERATURE: 97 F | RESPIRATION RATE: 14 BRPM | SYSTOLIC BLOOD PRESSURE: 118 MMHG | OXYGEN SATURATION: 97 % | DIASTOLIC BLOOD PRESSURE: 67 MMHG | WEIGHT: 201.13 LBS | HEART RATE: 61 BPM | HEIGHT: 74 IN

## 2025-07-24 DIAGNOSIS — N40.0 ENLARGED PROSTATE: ICD-10-CM

## 2025-07-24 DIAGNOSIS — Z13.6 CARDIOVASCULAR SCREENING; LDL GOAL LESS THAN 100: ICD-10-CM

## 2025-07-24 DIAGNOSIS — R53.83 FATIGUE, UNSPECIFIED TYPE: ICD-10-CM

## 2025-07-24 DIAGNOSIS — Z12.5 SCREENING FOR PROSTATE CANCER: ICD-10-CM

## 2025-07-24 DIAGNOSIS — I10 ESSENTIAL HYPERTENSION WITH GOAL BLOOD PRESSURE LESS THAN 140/90: ICD-10-CM

## 2025-07-24 DIAGNOSIS — N40.1 BENIGN PROSTATIC HYPERPLASIA WITH LOWER URINARY TRACT SYMPTOMS, SYMPTOM DETAILS UNSPECIFIED: ICD-10-CM

## 2025-07-24 DIAGNOSIS — I48.20 CHRONIC ATRIAL FIBRILLATION (H): ICD-10-CM

## 2025-07-24 DIAGNOSIS — Z00.00 ENCOUNTER FOR MEDICARE ANNUAL WELLNESS EXAM: Primary | ICD-10-CM

## 2025-07-24 DIAGNOSIS — R73.01 IMPAIRED FASTING GLUCOSE: ICD-10-CM

## 2025-07-24 DIAGNOSIS — L30.9 DERMATITIS OF LOWER EXTREMITY: ICD-10-CM

## 2025-07-24 RX ORDER — TAMSULOSIN HYDROCHLORIDE 0.4 MG/1
CAPSULE ORAL
Qty: 180 CAPSULE | Refills: 3 | Status: SHIPPED | OUTPATIENT
Start: 2025-07-24

## 2025-07-24 RX ORDER — TRIAMCINOLONE ACETONIDE 1 MG/G
CREAM TOPICAL
Qty: 80 G | Refills: 1 | Status: SHIPPED | OUTPATIENT
Start: 2025-07-24

## 2025-07-24 RX ORDER — DILTIAZEM HYDROCHLORIDE 360 MG/1
360 CAPSULE, EXTENDED RELEASE ORAL DAILY
Qty: 90 CAPSULE | Refills: 3 | Status: SHIPPED | OUTPATIENT
Start: 2025-07-24

## 2025-07-24 RX ORDER — WARFARIN SODIUM 5 MG/1
TABLET ORAL
Qty: 100 TABLET | Refills: 1 | Status: SHIPPED | OUTPATIENT
Start: 2025-07-24

## 2025-07-24 RX ORDER — METOPROLOL SUCCINATE 50 MG/1
50 TABLET, EXTENDED RELEASE ORAL DAILY
Qty: 90 TABLET | Refills: 3 | Status: SHIPPED | OUTPATIENT
Start: 2025-07-24

## 2025-07-24 RX ORDER — FINASTERIDE 5 MG/1
1 TABLET, FILM COATED ORAL DAILY
Qty: 90 TABLET | Refills: 3 | Status: SHIPPED | OUTPATIENT
Start: 2025-07-24

## 2025-07-24 SDOH — HEALTH STABILITY: PHYSICAL HEALTH: ON AVERAGE, HOW MANY DAYS PER WEEK DO YOU ENGAGE IN MODERATE TO STRENUOUS EXERCISE (LIKE A BRISK WALK)?: 1 DAY

## 2025-07-24 ASSESSMENT — PAIN SCALES - GENERAL: PAINLEVEL_OUTOF10: NO PAIN (0)

## 2025-07-24 ASSESSMENT — SOCIAL DETERMINANTS OF HEALTH (SDOH): HOW OFTEN DO YOU GET TOGETHER WITH FRIENDS OR RELATIVES?: ONCE A WEEK

## 2025-07-24 NOTE — PROGRESS NOTES
"Preventive Care Visit  [facility], Family Medicine  Jul 24, 2025      Assessment & Plan     (Z00.00) Encounter for Medicare annual wellness exam  (primary encounter diagnosis)  Comment: overall patient stable  Plan: he willl get labs checked when he gets inr checked early next week.  He will go in fasting.     (I10) Essential hypertension with goal blood pressure less than 140/90  Comment: blood pressure okay   Plan: no change in meds     (I48.20) Chronic atrial fibrillation (H)  Comment: refills done   Plan: metoprolol succinate ER (TOPROL XL) 50 MG 24 hr        tablet, diltiazem ER (TIAZAC) 360 MG 24 hr ER         beaded capsule  Refill warfarin also, gets inr checked regularly              (N40.1) Benign prostatic hyperplasia with lower urinary tract symptoms, symptom details unspecified  Comment: urinating  okay with combo of meds  Plan: tamsulosin (FLOMAX) 0.4 MG capsule             (L30.9) Dermatitis of lower extremity  Comment: trial of triamcinalone cream/ try this instead of hydrocortisone cream.  Also be generous with lotion / moisturizer  Plan: triamcinolone (KENALOG) 0.1 % external cream                (N40.0) Enlarged prostate  Comment: refill    Plan: finasteride (PROSCAR) 5 MG tablet             (Z13.6) CARDIOVASCULAR SCREENING; LDL GOAL LESS THAN 100  Comment: check   Plan: Lipid panel reflex to direct LDL Fasting             (R73.01) Impaired fasting glucose  Comment: check   Plan: Hemoglobin A1c             (R53.83) Fatigue, unspecified type  Comment: chck   Plan: Comprehensive metabolic panel, CBC with         Platelets & Differential, TSH with free T4         reflex             (Z12.5) Screening for prostate cancer  Comment: psa  Plan: Prostate Specific Antigen Screen               BMI  Estimated body mass index is 26.09 kg/m  as calculated from the following:    Height as of this encounter: 1.87 m (6' 1.62\").    Weight as of this encounter: 91.2 kg " (201 lb 2 oz).   Weight management plan: Discussed healthy diet and exercise guidelines    Counseling  Appropriate preventive services were addressed with this patient via screening, questionnaire, or discussion as appropriate for fall prevention, nutrition, physical activity, Tobacco-use cessation, social engagement, weight loss and cognition.  Checklist reviewing preventive services available has been given to the patient.  Reviewed patient's diet, addressing concerns and/or questions.   He is at risk for lack of exercise and has been provided with information to increase physical activity for the benefit of his well-being.   The patient was instructed to see the dentist every 6 months.   He is at risk for psychosocial distress and has been provided with information to reduce risk.   Discussed possible causes of fatigue. The patient reports drinking more than 3 alcoholic drinks per day and/or more than 7 drhnks per week. The patient was counseled and given information about possible harmful effects of excessive alcohol intake.Addressed any concerns about safety while driving.  The patient was provided with written information regarding signs of hearing loss.   Reviewed preventive health counseling, as reflected in patient instructions        Renan Krause is a 80 year old, presenting for the following:  Wellness Visit (Fasting)        7/24/2025     7:41 AM   Additional Questions   Roomed by Shaina STEVENS  Feeling okay    No regular walking    [place] away from door    Walking in stores    Gets inr checked regularly     Urinating well  The two meds have helped a lot    Stream fair    Emptying okay    Can go 4 hours without urinating    Kids healthy    Grandkids healthy          Advance Care Planning    Patient states has Health Care Directive and will send to [employer].        7/24/2025   General Health   How would you rate your overall physical health? Good   Feel stress (tense, anxious, or  unable to sleep) To some extent   (!) STRESS CONCERN      7/24/2025   Nutrition   Diet: Other   If other, please elaborate: avoid vitamin K         7/24/2025   Exercise   Days per week of moderate/strenous exercise 1 day   (!) EXERCISE CONCERN      7/24/2025   Social Factors   Frequency of gathering with friends or relatives Once a week   Worry food won't last until get money to buy more No   Food not last or not have enough money for food? No   Do you have housing? (Housing is defined as stable permanent housing and does not include staying outside in a car, in a tent, in an abandoned building, in an overnight shelter, or couch-surfing.) Yes   Are you worried about losing your housing? No   Lack of transportation? No   Unable to get utilities (heat,electricity)? No         7/24/2025   Fall Risk   Fallen 2 or more times in the past year? No   Trouble with walking or balance? No          7/24/2025   Activities of Daily Living- Home Safety   Needs help with the following daily activites None of the above   Safety concerns in the home None of the above         7/24/2025   Dental   Dentist two times every year? (!) NO         7/24/2025   Hearing Screening   Hearing concerns? (!) IT'S HARD TO FOLLOW A CONVERSATION IN A NOISY RESTAURANT OR CROWDED ROOM.    (!) TROUBLE UNDERSTANDING SOFT OR WHISPERED SPEECH.    (!) TROUBLE UNDERSTANDING SPEECH ON THE TELEPHONE   Would you like a referral for hearing testing? No       Multiple values from one day are sorted in reverse-chronological order         7/24/2025   Driving Risk Screening   Patient/family members have concerns about driving No but patient careful         7/24/2025   General Alertness/Fatigue Screening   Have you been more tired than usual lately? (!) YES         7/24/2025   Urinary Incontinence Screening   Bothered by leaking urine in past 6 months No         Today's PHQ-2 Score:       7/24/2025     7:37 AM   PHQ-2 ( 1999 [employer])   Q1: Little interest or pleasure in  doing things 0   Q2: Feeling down, depressed or hopeless 1   PHQ-2 Score 1    Q1: Little interest or pleasure in doing things Not at all   Q2: Feeling down, depressed or hopeless Several days   PHQ-2 Score 1       Patient-reported           7/24/2025   Substance Use   Alcohol more than 3/day or more than 7/wk Yes   How often do you have a drink containing alcohol 2 to 3 times a week   How many alcohol drinks on typical day 1 or 2   How often do you have 5+ drinks at one occasion Never   Audit 2/3 Score 0   How often not able to stop drinking once started Never   How often failed to do what normally expected Less than monthly   How often needed first drink in am after a heavy drinking session Never   How often feeling of guilt or remorse after drinking Less than monthly   How often unable to remember what happened the night before Less than monthly   Have you or someone else been injured because of your drinking No   Has anyone been concerned or suggested you cut down on drinking No   TOTAL SCORE - AUDIT 6   Do you have a current opioid prescription? No   How severe/bad is pain from 1 to 10? 0/10 (No Pain)   Do you use any other substances recreationally? No     Social History     Tobacco Use    Smoking status: Never     Passive exposure: Never    Smokeless tobacco: Never   Vaping Use    Vaping status: Never Used   Substance Use Topics    Alcohol use: Yes     Comment: 5 pm most days    Drug use: No                 Reviewed and updated as needed this visit by Provider                      Current providers sharing in care for this patient include:  Patient Care Team:  Rodolfo Pedersen MD as PCP - General (Family Medicine)  Rodolfo Pedersen MD as Assigned PCP  Ayo Mittal MD as MD (Surgery)    The following health maintenance items are reviewed in Epic and correct as of today:  Health Maintenance   Topic Date Due    ZOSTER VACCINE (1 of 2) Never done    RSV VACCINE (1 - 1-dose 75+ series) Never done     "ANNUAL REVIEW OF  ORDERS  10/27/2022    COLORECTAL CANCER SCREENING  11/02/2022    COVID-19 VACCINE (2 - 2024-25 season) 09/01/2024    MEDICARE ANNUAL WELLNESS VISIT  01/22/2025    BMP  01/23/2025    INFLUENZA VACCINE (1) 09/01/2025    DTAP/TDAP/TD VACCINE (2 - Td or Tdap) 09/26/2025    FALL RISK ASSESSMENT  07/24/2026    ADVANCE CARE PLANNING  01/22/2029    PHQ-2 (once per calendar year)  Completed    PNEUMOCOCCAL VACCINE 50+ YEARS  Completed    HPV VACCINE (No Doses Required) Completed    MENINGITIS VACCINE  Aged Out    TSH W/FREE T4 REFLEX  Discontinued       Happy with wt currently          Objective    Exam  /67 (BP Location: Right arm, Patient Position: Chair, Cuff Size: Adult Regular)   Pulse 61   Temp 97  F (36.1  C) (Temporal)   Resp 14   Ht 1.87 m (6' 1.62\")   Wt 91.2 kg (201 lb 2 oz)   SpO2 97%   BMI 26.09 kg/m     Estimated body mass index is 26.09 kg/m  as calculated from the following:    Height as of this encounter: 1.87 m (6' 1.62\").    Weight as of this encounter: 91.2 kg (201 lb 2 oz).    Physical Exam  GENERAL: alert and no distress  EYES: Eyes grossly normal to inspection, PERRL and conjunctivae and sclerae normal  HENT: ear canals and TM's normal, nose and mouth without ulcers or lesions  NECK: no adenopathy, no asymmetry, masses, or scars  RESP: lungs clear to auscultation - no rales, rhonchi or wheezes  CV: normal S1 S2, no S3 or S4, no murmur, click or rub, peripheral pulses strong, slight edema, only slightly irregular  ABDOMEN: soft, nontender, no hepatosplenomegaly, no masses and bowel sounds normal  MS: no gross musculoskeletal defects noted, no edema  SKIN: no suspicious lesions or rashes but has chronic dermatitis on right lower leg and foot   NEURO: Normal strength and tone, mentation intact and speech normal  PSYCH: mentation appears normal, affect normal/bright        7/24/2025   Mini Cog   Clock Draw Score 2 Normal   3 Item Recall 3 objects recalled   Mini Cog Total " Score 5         Vision Screen  Reason Vision Screen Not Completed: Screening Recommend: Patient/Guardian Declined (patient has been on Medicare for many years and he just had an eye exam recently)       Signed Electronically by: Rodolfo Pedersen MD

## 2025-07-24 NOTE — PATIENT INSTRUCTIONS
When you get INR done, have them draw the labs we put in also    Increase exercise as able    Continue same medications    Advise getting shingles shot at pharmacy    Could get RSV shot also     Try triamcinalone cream instead of hydrocortisone cream on the leg for a few weeks    Use lotion or vaseline to keep well moisturized                   Patient Education   Preventive Care Advice   This is general advice given by our system to help you stay healthy. However, your care team may have specific advice just for you. Please talk to your care team about your preventive care needs.  Nutrition  Eat 5 or more servings of fruits and vegetables each day.  Try wheat bread, brown rice and whole grain pasta (instead of white bread, rice, and pasta).  Get enough calcium and vitamin D. Check the label on foods and aim for 100% of the RDA (recommended daily allowance).  Lifestyle  Exercise at least 150 minutes each week  (30 minutes a day, 5 days a week).  Do muscle strengthening activities 2 days a week. These help control your weight and prevent disease.  No smoking.  Wear sunscreen to prevent skin cancer.  Have a dental exam and cleaning every 6 months.  Yearly exams  See your health care team every year to talk about:  Any changes in your health.  Any medicines your care team has prescribed.  Preventive care, family planning, and ways to prevent chronic diseases.  Shots (vaccines)   HPV shots (up to age 26), if you've never had them before.  Hepatitis B shots (up to age 59), if you've never had them before.  COVID-19 shot: Get this shot when it's due.  Flu shot: Get a flu shot every year.  Tetanus shot: Get a tetanus shot every 10 years.  Pneumococcal, hepatitis A, and RSV shots: Ask your care team if you need these based on your risk.  Shingles shot (for age 50 and up)  General health tests  Diabetes screening:  Starting at age 35, Get screened for diabetes at least every 3 years.  If you are younger than age 35, ask your  care team if you should be screened for diabetes.  Cholesterol test: At age 39, start having a cholesterol test every 5 years, or more often if advised.  Bone density scan (DEXA): At age 50, ask your care team if you should have this scan for osteoporosis (brittle bones).  Hepatitis C: Get tested at least once in your life.  STIs (sexually transmitted infections)  Before age 24: Ask your care team if you should be screened for STIs.  After age 24: Get screened for STIs if you're at risk. You are at risk for STIs (including HIV) if:  You are sexually active with more than one person.  You don't use condoms every time.  You or a partner was diagnosed with a sexually transmitted infection.  If you are at risk for HIV, ask about PrEP medicine to prevent HIV.  Get tested for HIV at least once in your life, whether you are at risk for HIV or not.  Cancer screening tests  Cervical cancer screening: If you have a cervix, begin getting regular cervical cancer screening tests starting at age 21.  Breast cancer scan (mammogram): If you've ever had breasts, begin having regular mammograms starting at age 40. This is a scan to check for breast cancer.  Colon cancer screening: It is important to start screening for colon cancer at age 45.  Have a colonoscopy test every 10 years (or more often if you're at risk) Or, ask your provider about stool tests like a FIT test every year or Cologuard test every 3 years.  To learn more about your testing options, visit:   .  For help making a decision, visit:   https://bit.ly/is88519.  Prostate cancer screening test: If you have a prostate, ask your care team if a prostate cancer screening test (PSA) at age 55 is right for you.  Lung cancer screening: If you are a current or former smoker ages 50 to 80, ask your care team if ongoing lung cancer screenings are right for you.  For informational purposes only. Not to replace the advice of your health care provider. Copyright   2023 [facility]. All rights reserved. Clinically reviewed by the [facility]itions [facility]. [facility] 366340 - REV 01/24.  Hearing Loss: Care Instructions  Overview     Hearing loss is a sudden or slow decrease in how well you hear. It can range from slight to profound. Permanent hearing loss can occur with aging. It also can happen when you are exposed long-term to loud noise. Examples include listening to loud music, riding motorcycles, or being around other loud machines.  Hearing loss can affect your work and home life. It can make you feel lonely or depressed. You may feel that you have lost your independence. But hearing aids and other devices can help you hear better and feel connected to others.  Follow-up care is a key part of your treatment and safety. Be sure to make and go to all appointments, and call your doctor if you are having problems. It's also a good idea to know your test results and keep a list of the medicines you take.  How can you care for yourself at home?  Avoid loud noises whenever possible. This helps keep your hearing from getting worse.  Always wear hearing protection around loud noises.  Wear a hearing aid as directed.  A professional can help you pick a hearing aid that will work best for you.  You can also get hearing aids over the counter for mild to moderate hearing loss.  Have hearing tests as your doctor suggests. They can show whether your hearing has changed. Your hearing aid may need to be adjusted.  Use other devices as needed. These may include:  Telephone amplifiers and hearing aids that can connect to a television, stereo, radio, or microphone.  Devices that use lights or vibrations. These alert you to the doorbell, a ringing telephone, or a baby monitor.  Television closed-captioning. This shows the words at the bottom of the screen. Most new TVs can do this.  TTY (text telephone). This lets you type messages back and forth on the telephone instead of  "talking or listening. These devices are also called TDD. When messages are typed on the keyboard, they are sent over the phone line to a receiving TTY. The message is shown on a monitor.  Use text messaging, social media, and email if it is hard for you to communicate by telephone.  Try to learn a listening technique called speechreading. It is not lipreading. You pay attention to people's gestures, expressions, posture, and tone of voice. These clues can help you understand what a person is saying. Face the person you are talking to, and have them face you. Make sure the lighting is good. You need to see the other person's face clearly.  Think about counseling if you need help to adjust to your hearing loss.  When should you call for help?  Watch closely for changes in your health, and be sure to contact your doctor if:    You think your hearing is getting worse.     You have new symptoms, such as dizziness or nausea.   Where can you learn more?  Go to https://www.[employer].net/patiented  Enter R798 in the search box to learn more about \"Hearing Loss: Care Instructions.\"  Current as of: October 27, 2024  Content Version: 14.5    [id number] [employer]   Care instructions adapted under license by your healthcare professional. If you have questions about a medical condition or this instruction, always ask your healthcare professional. [employer] disclaims any warranty or liability for your use of this information.    Learning About Stress  What is stress?     Stress is your body's response to a hard situation. Your body can have a physical, emotional, or mental response. Stress is a fact of life for most people, and it affects everyone differently. What causes stress for you may not be stressful for someone else.  A lot of things can cause stress. You may feel stress when you go on a job interview, take a test, or run a race. This kind of short-term stress is normal and even useful. It can " help you if you need to work hard or react quickly. For example, stress can help you finish an important job on time.  Long-term stress is caused by ongoing stressful situations or events. Examples of long-term stress include long-term health problems, ongoing problems at work, or conflicts in your family. Long-term stress can harm your health.  How does stress affect your health?  When you are stressed, your body responds as though you are in danger. It makes hormones that speed up your heart, make you breathe faster, and give you a burst of energy. This is called the fight-or-flight stress response. If the stress is over quickly, your body goes back to normal and no harm is done.  But if stress happens too often or lasts too long, it can have bad effects. Long-term stress can make you more likely to get sick, and it can make symptoms of some diseases worse. If you tense up when you are stressed, you may develop neck, shoulder, or low back pain. Stress is linked to high blood pressure and heart disease.  Stress also harms your emotional health. It can make you maguire, tense, or depressed. Your relationships may suffer, and you may not do well at work or school.  What can you do to manage stress?  You can try these things to help manage stress:   Do something active. Exercise or activity can help reduce stress. Walking is a great way to get started. Even everyday activities such as housecleaning or yard work can help.  Try yoga or caro chi. These techniques combine exercise and meditation. You may need some training at first to learn them.  Do something you enjoy. For example, listen to music or go to a movie. Practice your hobby or do volunteer work.  Meditate. This can help you relax, because you are not worrying about what happened before or what may happen in the future.  Do guided imagery. Imagine yourself in any setting that helps you feel calm. You can use online videos, books, or a teacher to guide you.  Do  "breathing exercises. For example:  From a standing position, bend forward from the waist with your knees slightly bent. Let your arms dangle close to the floor.  Breathe in slowly and deeply as you return to a standing position. Roll up slowly and lift your head last.  Hold your breath for just a few seconds in the standing position.  Breathe out slowly and bend forward from the waist.  Let your feelings out. Talk, laugh, cry, and express anger when you need to. Talking with supportive friends or family, a counselor, or a l[employer] anton[employer] leader about your feelings is a healthy way to relieve stress. Avoid discussing your feelings with people who make you feel worse.  Write. It may help to write about things that are bothering you. This helps you find out how much stress you feel and what is causing it. When you know this, you can find better ways to cope.  What can you do to prevent stress?  You might try some of these things to help prevent stress:  Manage your time. This helps you find time to do the things you want and need to do.  Get enough sleep. Your body recovers from the stresses of the day while you are sleeping.  Get support. Your family, friends, and community can make a difference in how you experience stress.  Limit your news feed. Avoid or limit time on social media or news that may make you feel stressed.  Do something active. Exercise or activity can help reduce stress. Walking is a great way to get started.  Where can you learn more?  Go to https://www.[employer].net/patiented  Enter N032 in the search box to learn more about \"Learning About Stress.\"  Current as of: October 24, 2024  Content Version: 14.5 2024-2025 [employer].   Care instructions adapted under license by your healthcare professional. If you have questions about a medical condition or this instruction, always ask your healthcare professional. [employer] disclaims any warranty or liability for your use of this " information.    Learning About Sleeping Well  What does sleeping well mean?     Sleeping well means getting enough sleep to feel good and stay healthy. How much sleep is enough varies among people.  The number of hours you sleep and how you feel when you wake up are both important. If you do not feel refreshed, you probably need more sleep. Another sign of not getting enough sleep is feeling tired during the day.  Experts recommend that adults get at least 7 or more hours of sleep per day. Children and older adults need more sleep.  Why is getting enough sleep important?  Getting enough quality sleep is a basic part of good health. When your sleep suffers, your physical health, mood, and your thoughts can suffer too. You may find yourself feeling more grumpy or stressed. Not getting enough sleep also can lead to serious problems, including injury, accidents, anxiety, and depression.  What might cause poor sleeping?  Many things can cause sleep problems, including:  Changes to your sleep schedule.  Stress. Stress can be caused by fear about a single event, such as giving a speech. Or you may have ongoing stress, such as worry about work or school.  Depression, anxiety, and other mental or emotional conditions.  Changes in your sleep habits or surroundings. This includes changes that happen where you sleep, such as noise, light, or sleeping in a different bed. It also includes changes in your sleep pattern, such as having jet lag or working a late shift.  Health problems, such as pain, breathing problems, and restless legs syndrome.  Lack of regular exercise.  Using alcohol, nicotine, or caffeine before bed.  How can you help yourself?  Here are some tips that may help you sleep more soundly and wake up feeling more refreshed.  Your sleeping area   Use your bedroom only for sleeping and sex. A bit of light reading may help you fall asleep. But if it doesn't, do your reading elsewhere in the house. Try not to use your  "TV, computer, smartphone, or tablet while you are in bed.  Be sure your bed is big enough to stretch out comfortably, especially if you have a sleep partner.  Keep your bedroom quiet, dark, and cool. Use curtains, blinds, or a sleep mask to block out light. To block out noise, use earplugs, soothing music, or a \"white noise\" machine.  Your evening and bedtime routine   Create a relaxing bedtime routine. You might want to take a warm shower or bath, or listen to soothing music.  Go to bed at the same time every night. And get up at the same time every morning, even if you feel tired.  What to avoid   Limit caffeine (coffee, tea, caffeinated sodas) during the day, and don't have any for at least 6 hours before bedtime.  Avoid drinking alcohol before bedtime. Alcohol can cause you to wake up more often during the night.  Try not to smoke or use tobacco, especially in the evening. Nicotine can keep you awake.  Limit naps during the day, especially close to bedtime.  Avoid lying in bed awake for too long. If you can't fall asleep or if you wake up in the middle of the night and can't get back to sleep within about 20 minutes, get out of bed and go to another room until you feel sleepy.  Avoid taking medicine right before bed that may keep you awake or make you feel hyper or energized. Your doctor can tell you if your medicine may do this and if you can take it earlier in the day.  If you can't sleep   Imagine yourself in a peaceful, pleasant scene. Focus on the details and feelings of being in a place that is relaxing.  Get up and do a quiet or boring activity until you feel sleepy.  Avoid drinking any liquids before going to bed to help prevent waking up often to use the bathroom.  Where can you learn more?  Go to https://www.[employer].net/patiented  Enter J942 in the search box to learn more about \"Learning About Sleeping Well.\"  Current as of: July 31, 2024  Content Version: 14.5 2024-2025 [facility] [employer] " "  Care instructions adapted under license by your healthcare professional. If you have questions about a medical condition or this instruction, always ask your healthcare professional. [employer] disclaims any warranty or liability for your use of this information.    9 Ways to Cut Back on Drinking  Maybe you've found yourself drinking more alcohol than you'd prefer. If you want to cut back, here are some ideas to try.    Think before you drink.  Do you really want a drink, or is it just a habit? If you're used to having a drink at a certain time, try doing something else then.     Look for substitutes.  Find some no-alcohol drinks that you enjoy, like flavored seltzer water, tea with honey, or tonic with a slice of lime. Or try alcohol-free beer or \"virgin\" cocktails (without the alcohol).     Drink more water.  Use water to quench your thirst. Drink a glass of water before you have any alcohol. Have another glass along with every drink or between drinks.     Shrink your drink.  For example, have a bottle of beer instead of a pint. Use a smaller glass for wine. Choose drinks with lower alcohol content (ABV%). Or use less liquor and more mixer in cocktails.     Slow down.  It's easy to drink quickly and without thinking about it. Pay attention, and make each drink last longer.     Do the math.  Total up how much you spend on alcohol each month. How much is that a year? If you cut back, what could you do with the money you save?     Take a break.  Choose a day or two each week when you won't drink at all. Notice how you feel on those days, physically and emotionally. How did you sleep? Do you feel better? Over time, add more break days.     Count calories.  Would you like to lose some weight? For some people that's a good motivator for cutting back. Figure out how many calories are in each drink. How many does that add up to in a day? In a week? In a month?     Practice saying no.  Be ready when someone " "offers you a drink. Try: \"Thanks, I've had enough.\" Or \"Thanks, but I'm cutting back.\" Or \"No, thanks. I feel better when I drink less.\"   Current as of: August 20, 2024  Content Version: 14.5 2024-2025 [employer].   Care instructions adapted under license by your healthcare professional. If you have questions about a medical condition or this instruction, always ask your healthcare professional. [employer] disclaims any warranty or liability for your use of this information.     "

## 2025-08-04 ENCOUNTER — MYC MEDICAL ADVICE (OUTPATIENT)
Dept: ANTICOAGULATION | Facility: CLINIC | Age: 80
End: 2025-08-04
Payer: COMMERCIAL

## 2025-08-12 ENCOUNTER — ANTICOAGULATION THERAPY VISIT (OUTPATIENT)
Dept: ANTICOAGULATION | Facility: CLINIC | Age: 80
End: 2025-08-12

## 2025-08-12 ENCOUNTER — RESULTS FOLLOW-UP (OUTPATIENT)
Dept: ANTICOAGULATION | Facility: CLINIC | Age: 80
End: 2025-08-12

## 2025-08-12 ENCOUNTER — LAB (OUTPATIENT)
Dept: LAB | Facility: CLINIC | Age: 80
End: 2025-08-12
Payer: COMMERCIAL

## 2025-08-12 DIAGNOSIS — I48.91 ATRIAL FIBRILLATION, UNSPECIFIED TYPE (H): ICD-10-CM

## 2025-08-12 DIAGNOSIS — I48.91 ATRIAL FIBRILLATION WITH RVR (H): Primary | ICD-10-CM

## 2025-08-12 DIAGNOSIS — I48.91 ATRIAL FIBRILLATION WITH RVR (H): ICD-10-CM

## 2025-08-12 DIAGNOSIS — Z13.6 CARDIOVASCULAR SCREENING; LDL GOAL LESS THAN 100: ICD-10-CM

## 2025-08-12 DIAGNOSIS — Z12.5 SCREENING FOR PROSTATE CANCER: ICD-10-CM

## 2025-08-12 DIAGNOSIS — R53.83 FATIGUE, UNSPECIFIED TYPE: ICD-10-CM

## 2025-08-12 DIAGNOSIS — R73.01 IMPAIRED FASTING GLUCOSE: ICD-10-CM

## 2025-08-12 LAB
ALBUMIN SERPL BCG-MCNC: 4 G/DL (ref 3.5–5.2)
ALP SERPL-CCNC: 89 U/L (ref 40–150)
ALT SERPL W P-5'-P-CCNC: 17 U/L (ref 0–70)
ANION GAP SERPL CALCULATED.3IONS-SCNC: 13 MMOL/L (ref 7–15)
AST SERPL W P-5'-P-CCNC: 23 U/L (ref 0–45)
BASOPHILS # BLD AUTO: 0 10E3/UL (ref 0–0.2)
BASOPHILS NFR BLD AUTO: 0 %
BILIRUB SERPL-MCNC: 0.5 MG/DL
BUN SERPL-MCNC: 13.3 MG/DL (ref 8–23)
CALCIUM SERPL-MCNC: 9.1 MG/DL (ref 8.8–10.4)
CHLORIDE SERPL-SCNC: 107 MMOL/L (ref 98–107)
CHOLEST SERPL-MCNC: 137 MG/DL
CREAT SERPL-MCNC: 1.02 MG/DL (ref 0.67–1.17)
EGFRCR SERPLBLD CKD-EPI 2021: 73 ML/MIN/1.73M2
EOSINOPHIL # BLD AUTO: 0.1 10E3/UL (ref 0–0.7)
EOSINOPHIL NFR BLD AUTO: 2 %
ERYTHROCYTE [DISTWIDTH] IN BLOOD BY AUTOMATED COUNT: 12.4 % (ref 10–15)
EST. AVERAGE GLUCOSE BLD GHB EST-MCNC: 105 MG/DL
FASTING STATUS PATIENT QL REPORTED: YES
FASTING STATUS PATIENT QL REPORTED: YES
GLUCOSE SERPL-MCNC: 106 MG/DL (ref 70–99)
HBA1C MFR BLD: 5.3 % (ref 0–5.6)
HCO3 SERPL-SCNC: 21 MMOL/L (ref 22–29)
HCT VFR BLD AUTO: 43.2 % (ref 40–53)
HDLC SERPL-MCNC: 54 MG/DL
HGB BLD-MCNC: 14.9 G/DL (ref 13.3–17.7)
IMM GRANULOCYTES # BLD: 0 10E3/UL
IMM GRANULOCYTES NFR BLD: 0 %
INR BLD: 3 (ref 0.9–1.1)
LDLC SERPL CALC-MCNC: 73 MG/DL
LYMPHOCYTES # BLD AUTO: 1.2 10E3/UL (ref 0.8–5.3)
LYMPHOCYTES NFR BLD AUTO: 26 %
MCH RBC QN AUTO: 31.5 PG (ref 26.5–33)
MCHC RBC AUTO-ENTMCNC: 34.5 G/DL (ref 31.5–36.5)
MCV RBC AUTO: 91 FL (ref 78–100)
MONOCYTES # BLD AUTO: 0.6 10E3/UL (ref 0–1.3)
MONOCYTES NFR BLD AUTO: 13 %
NEUTROPHILS # BLD AUTO: 2.7 10E3/UL (ref 1.6–8.3)
NEUTROPHILS NFR BLD AUTO: 57 %
NONHDLC SERPL-MCNC: 83 MG/DL
PLATELET # BLD AUTO: 174 10E3/UL (ref 150–450)
POTASSIUM SERPL-SCNC: 4.4 MMOL/L (ref 3.4–5.3)
PROT SERPL-MCNC: 6.7 G/DL (ref 6.4–8.3)
PSA SERPL DL<=0.01 NG/ML-MCNC: 1.29 NG/ML
RBC # BLD AUTO: 4.73 10E6/UL (ref 4.4–5.9)
SODIUM SERPL-SCNC: 141 MMOL/L (ref 135–145)
TRIGL SERPL-MCNC: 50 MG/DL
TSH SERPL DL<=0.005 MIU/L-ACNC: 3.51 UIU/ML (ref 0.3–4.2)
WBC # BLD AUTO: 4.7 10E3/UL (ref 4–11)

## 2025-08-12 PROCEDURE — 85025 COMPLETE CBC W/AUTO DIFF WBC: CPT

## 2025-08-12 PROCEDURE — 36415 COLL VENOUS BLD VENIPUNCTURE: CPT

## 2025-08-12 PROCEDURE — 84443 ASSAY THYROID STIM HORMONE: CPT

## 2025-08-12 PROCEDURE — 3044F HG A1C LEVEL LT 7.0%: CPT

## 2025-08-12 PROCEDURE — G0103 PSA SCREENING: HCPCS

## 2025-08-12 PROCEDURE — 80053 COMPREHEN METABOLIC PANEL: CPT

## 2025-08-12 PROCEDURE — 85610 PROTHROMBIN TIME: CPT

## 2025-08-12 PROCEDURE — 83036 HEMOGLOBIN GLYCOSYLATED A1C: CPT

## 2025-08-12 PROCEDURE — 80061 LIPID PANEL: CPT

## (undated) DEVICE — DRAIN PENROSE 0.25"X18" LATEX FREE GR201

## (undated) DEVICE — DRAPE LAP W/ARMBOARD 29410

## (undated) DEVICE — SOL WATER IRRIG 1000ML BOTTLE 07139-09

## (undated) DEVICE — ESU GROUND PAD ADULT W/CORD E7507

## (undated) DEVICE — LINEN ORTHO PACK 5446

## (undated) DEVICE — DRSG GAUZE 4X4" 3033

## (undated) DEVICE — SYR 10ML FINGER CONTROL W/O NDL 309695

## (undated) DEVICE — SU VICRYL 2-0 CT-2 27" J333H

## (undated) DEVICE — SUPPORTER ATHLETIC LG LATEX 202636

## (undated) DEVICE — SOL NACL 0.9% IRRIG 500ML BOTTLE 2F7123

## (undated) DEVICE — SU MONOCRYL 4-0 PS-2 27" UND Y426H

## (undated) DEVICE — GLOVE PROTEXIS W/NEU-THERA 7.5  2D73TE75

## (undated) DEVICE — DRSG STERI STRIP 1/2X4" R1547

## (undated) DEVICE — BLADE KNIFE SURG 10 371110

## (undated) DEVICE — PACK MINOR CUSTOM ASC

## (undated) DEVICE — PREP CHLORAPREP 26ML TINTED ORANGE  260815

## (undated) DEVICE — SU VICRYL 0 CT-2 27" J334H

## (undated) RX ORDER — FENTANYL CITRATE-0.9 % NACL/PF 10 MCG/ML
PLASTIC BAG, INJECTION (ML) INTRAVENOUS
Status: DISPENSED
Start: 2023-01-16

## (undated) RX ORDER — FENTANYL CITRATE 50 UG/ML
INJECTION, SOLUTION INTRAMUSCULAR; INTRAVENOUS
Status: DISPENSED
Start: 2023-01-16

## (undated) RX ORDER — PROPOFOL 10 MG/ML
INJECTION, EMULSION INTRAVENOUS
Status: DISPENSED
Start: 2023-01-16

## (undated) RX ORDER — ACETAMINOPHEN 325 MG/1
TABLET ORAL
Status: DISPENSED
Start: 2023-01-16

## (undated) RX ORDER — FENTANYL CITRATE 50 UG/ML
INJECTION, SOLUTION INTRAMUSCULAR; INTRAVENOUS
Status: DISPENSED
Start: 2017-11-02

## (undated) RX ORDER — BUPIVACAINE HYDROCHLORIDE 2.5 MG/ML
INJECTION, SOLUTION EPIDURAL; INFILTRATION; INTRACAUDAL
Status: DISPENSED
Start: 2023-01-16

## (undated) RX ORDER — ONDANSETRON 2 MG/ML
INJECTION INTRAMUSCULAR; INTRAVENOUS
Status: DISPENSED
Start: 2023-01-16

## (undated) RX ORDER — SIMETHICONE 40MG/0.6ML
SUSPENSION, DROPS(FINAL DOSAGE FORM)(ML) ORAL
Status: DISPENSED
Start: 2023-01-17

## (undated) RX ORDER — BUPIVACAINE HYDROCHLORIDE 5 MG/ML
INJECTION, SOLUTION EPIDURAL; INTRACAUDAL
Status: DISPENSED
Start: 2023-01-16

## (undated) RX ORDER — CEFAZOLIN SODIUM 2 G/50ML
SOLUTION INTRAVENOUS
Status: DISPENSED
Start: 2023-01-16